# Patient Record
Sex: MALE | Race: WHITE | NOT HISPANIC OR LATINO | Employment: OTHER | URBAN - METROPOLITAN AREA
[De-identification: names, ages, dates, MRNs, and addresses within clinical notes are randomized per-mention and may not be internally consistent; named-entity substitution may affect disease eponyms.]

---

## 2017-04-20 ENCOUNTER — HOSPITAL ENCOUNTER (OUTPATIENT)
Dept: LAB | Facility: MEDICAL CENTER | Age: 77
End: 2017-04-20
Attending: INTERNAL MEDICINE
Payer: MEDICARE

## 2017-04-20 LAB
INR PPP: 3.27 (ref 0.87–1.13)
PROTHROMBIN TIME: 34.4 SEC (ref 12–14.6)

## 2017-04-20 PROCEDURE — 36415 COLL VENOUS BLD VENIPUNCTURE: CPT

## 2017-04-20 PROCEDURE — 85610 PROTHROMBIN TIME: CPT

## 2017-04-25 ENCOUNTER — HOSPITAL ENCOUNTER (OUTPATIENT)
Dept: HOSPITAL 8 - CACL | Age: 77
Discharge: HOME | End: 2017-04-25
Attending: INTERNAL MEDICINE
Payer: MEDICARE

## 2017-04-25 VITALS — BODY MASS INDEX: 24.96 KG/M2 | HEIGHT: 72 IN | WEIGHT: 184.31 LBS

## 2017-04-25 DIAGNOSIS — H40.9: ICD-10-CM

## 2017-04-25 DIAGNOSIS — M51.36: ICD-10-CM

## 2017-04-25 DIAGNOSIS — I48.91: Primary | ICD-10-CM

## 2017-04-25 DIAGNOSIS — Z95.1: ICD-10-CM

## 2017-04-25 DIAGNOSIS — G43.809: ICD-10-CM

## 2017-04-25 DIAGNOSIS — I34.0: ICD-10-CM

## 2017-04-25 DIAGNOSIS — F41.1: ICD-10-CM

## 2017-04-25 DIAGNOSIS — I10: ICD-10-CM

## 2017-04-25 DIAGNOSIS — I25.10: ICD-10-CM

## 2017-04-25 LAB — BUN SERPL-MCNC: 20 MG/DL (ref 7–18)

## 2017-04-25 PROCEDURE — 85025 COMPLETE CBC W/AUTO DIFF WBC: CPT

## 2017-04-25 PROCEDURE — 93005 ELECTROCARDIOGRAM TRACING: CPT

## 2017-04-25 PROCEDURE — 92960 CARDIOVERSION ELECTRIC EXT: CPT

## 2017-04-25 PROCEDURE — 80048 BASIC METABOLIC PNL TOTAL CA: CPT

## 2017-04-25 PROCEDURE — 85610 PROTHROMBIN TIME: CPT

## 2017-04-25 PROCEDURE — 36415 COLL VENOUS BLD VENIPUNCTURE: CPT

## 2017-05-03 ENCOUNTER — HOSPITAL ENCOUNTER (OUTPATIENT)
Dept: LAB | Facility: MEDICAL CENTER | Age: 77
End: 2017-05-03
Attending: INTERNAL MEDICINE
Payer: MEDICARE

## 2017-05-03 LAB
INR PPP: 2.76 (ref 0.87–1.13)
PROTHROMBIN TIME: 30 SEC (ref 12–14.6)

## 2017-05-03 PROCEDURE — 85610 PROTHROMBIN TIME: CPT

## 2017-05-03 PROCEDURE — 36415 COLL VENOUS BLD VENIPUNCTURE: CPT

## 2017-05-10 ENCOUNTER — HOSPITAL ENCOUNTER (OUTPATIENT)
Dept: LAB | Facility: MEDICAL CENTER | Age: 77
End: 2017-05-10
Attending: INTERNAL MEDICINE
Payer: MEDICARE

## 2017-05-10 LAB
INR PPP: 2.96 (ref 0.87–1.13)
PROTHROMBIN TIME: 31.7 SEC (ref 12–14.6)

## 2017-05-10 PROCEDURE — 85610 PROTHROMBIN TIME: CPT

## 2017-05-10 PROCEDURE — 36415 COLL VENOUS BLD VENIPUNCTURE: CPT

## 2017-05-23 ENCOUNTER — HOSPITAL ENCOUNTER (OUTPATIENT)
Dept: LAB | Facility: MEDICAL CENTER | Age: 77
End: 2017-05-23
Attending: INTERNAL MEDICINE
Payer: MEDICARE

## 2017-05-23 LAB
INR PPP: 2.69 (ref 0.87–1.13)
PROTHROMBIN TIME: 29.4 SEC (ref 12–14.6)

## 2017-05-23 PROCEDURE — 36415 COLL VENOUS BLD VENIPUNCTURE: CPT

## 2017-05-23 PROCEDURE — 85610 PROTHROMBIN TIME: CPT

## 2017-05-26 ENCOUNTER — HOSPITAL ENCOUNTER (OUTPATIENT)
Dept: HOSPITAL 8 - CACL | Age: 77
Discharge: HOME | End: 2017-05-26
Attending: INTERNAL MEDICINE
Payer: MEDICARE

## 2017-05-26 VITALS — SYSTOLIC BLOOD PRESSURE: 121 MMHG | DIASTOLIC BLOOD PRESSURE: 79 MMHG

## 2017-05-26 VITALS — HEIGHT: 71 IN | BODY MASS INDEX: 24.83 KG/M2 | WEIGHT: 177.36 LBS

## 2017-05-26 DIAGNOSIS — E03.9: ICD-10-CM

## 2017-05-26 DIAGNOSIS — F41.1: ICD-10-CM

## 2017-05-26 DIAGNOSIS — H40.9: ICD-10-CM

## 2017-05-26 DIAGNOSIS — G43.809: ICD-10-CM

## 2017-05-26 DIAGNOSIS — I34.0: ICD-10-CM

## 2017-05-26 DIAGNOSIS — E78.5: ICD-10-CM

## 2017-05-26 DIAGNOSIS — I48.0: Primary | ICD-10-CM

## 2017-05-26 DIAGNOSIS — I48.92: ICD-10-CM

## 2017-05-26 DIAGNOSIS — Z87.891: ICD-10-CM

## 2017-05-26 DIAGNOSIS — I25.10: ICD-10-CM

## 2017-05-26 DIAGNOSIS — Z95.1: ICD-10-CM

## 2017-05-26 DIAGNOSIS — M19.90: ICD-10-CM

## 2017-05-26 DIAGNOSIS — M51.36: ICD-10-CM

## 2017-05-26 LAB — BUN SERPL-MCNC: 21 MG/DL (ref 7–18)

## 2017-05-26 PROCEDURE — 36415 COLL VENOUS BLD VENIPUNCTURE: CPT

## 2017-05-26 PROCEDURE — 92960 CARDIOVERSION ELECTRIC EXT: CPT

## 2017-05-26 PROCEDURE — 80048 BASIC METABOLIC PNL TOTAL CA: CPT

## 2017-06-14 ENCOUNTER — HOSPITAL ENCOUNTER (OUTPATIENT)
Dept: LAB | Facility: MEDICAL CENTER | Age: 77
End: 2017-06-14
Attending: INTERNAL MEDICINE
Payer: MEDICARE

## 2017-06-14 LAB
INR PPP: 4.55 (ref 0.87–1.13)
PROTHROMBIN TIME: 44.5 SEC (ref 12–14.6)

## 2017-06-14 PROCEDURE — 85610 PROTHROMBIN TIME: CPT

## 2017-06-14 PROCEDURE — 36415 COLL VENOUS BLD VENIPUNCTURE: CPT

## 2017-07-03 ENCOUNTER — HOSPITAL ENCOUNTER (OUTPATIENT)
Dept: LAB | Facility: MEDICAL CENTER | Age: 77
End: 2017-07-03
Attending: INTERNAL MEDICINE
Payer: MEDICARE

## 2017-07-03 LAB
INR PPP: 3.37 (ref 0.87–1.13)
PROTHROMBIN TIME: 35.1 SEC (ref 12–14.6)

## 2017-07-03 PROCEDURE — 85610 PROTHROMBIN TIME: CPT

## 2017-07-03 PROCEDURE — 36415 COLL VENOUS BLD VENIPUNCTURE: CPT

## 2017-07-18 ENCOUNTER — HOSPITAL ENCOUNTER (OUTPATIENT)
Dept: LAB | Facility: MEDICAL CENTER | Age: 77
End: 2017-07-18
Attending: INTERNAL MEDICINE
Payer: MEDICARE

## 2017-07-18 LAB
INR PPP: 2.8 (ref 0.87–1.13)
PROTHROMBIN TIME: 30.4 SEC (ref 12–14.6)

## 2017-07-18 PROCEDURE — 85610 PROTHROMBIN TIME: CPT

## 2017-07-18 PROCEDURE — 36415 COLL VENOUS BLD VENIPUNCTURE: CPT

## 2017-08-02 ENCOUNTER — HOSPITAL ENCOUNTER (OUTPATIENT)
Dept: LAB | Facility: MEDICAL CENTER | Age: 77
End: 2017-08-02
Attending: INTERNAL MEDICINE
Payer: MEDICARE

## 2017-08-02 LAB
INR PPP: 2.23 (ref 0.87–1.13)
PROTHROMBIN TIME: 25.4 SEC (ref 12–14.6)

## 2017-08-02 PROCEDURE — 85610 PROTHROMBIN TIME: CPT

## 2017-08-02 PROCEDURE — 36415 COLL VENOUS BLD VENIPUNCTURE: CPT

## 2017-08-25 ENCOUNTER — HOSPITAL ENCOUNTER (OUTPATIENT)
Dept: LAB | Facility: MEDICAL CENTER | Age: 77
End: 2017-08-25
Attending: INTERNAL MEDICINE
Payer: MEDICARE

## 2017-08-25 LAB
INR PPP: 2.33 (ref 0.87–1.13)
PROTHROMBIN TIME: 26.3 SEC (ref 12–14.6)

## 2017-08-25 PROCEDURE — 85610 PROTHROMBIN TIME: CPT

## 2017-08-25 PROCEDURE — 36415 COLL VENOUS BLD VENIPUNCTURE: CPT

## 2017-09-02 ENCOUNTER — HOSPITAL ENCOUNTER (EMERGENCY)
Facility: MEDICAL CENTER | Age: 77
End: 2017-09-03
Attending: EMERGENCY MEDICINE
Payer: MEDICARE

## 2017-09-02 DIAGNOSIS — S05.02XA INJURY OF CONJUNCTIVA AND CORNEAL ABRASION OF LEFT EYE W/O FB, INITIAL ENCOUNTER: ICD-10-CM

## 2017-09-02 PROCEDURE — 99284 EMERGENCY DEPT VISIT MOD MDM: CPT

## 2017-09-03 VITALS
RESPIRATION RATE: 18 BRPM | WEIGHT: 190.26 LBS | TEMPERATURE: 99.3 F | HEIGHT: 72 IN | HEART RATE: 80 BPM | SYSTOLIC BLOOD PRESSURE: 145 MMHG | BODY MASS INDEX: 25.77 KG/M2 | DIASTOLIC BLOOD PRESSURE: 90 MMHG | OXYGEN SATURATION: 96 %

## 2017-09-03 PROCEDURE — 303754 HCHG EYE PATCH

## 2017-09-03 PROCEDURE — 700101 HCHG RX REV CODE 250

## 2017-09-03 PROCEDURE — 99284 EMERGENCY DEPT VISIT MOD MDM: CPT

## 2017-09-03 PROCEDURE — A9270 NON-COVERED ITEM OR SERVICE: HCPCS | Performed by: EMERGENCY MEDICINE

## 2017-09-03 PROCEDURE — 700102 HCHG RX REV CODE 250 W/ 637 OVERRIDE(OP): Performed by: EMERGENCY MEDICINE

## 2017-09-03 PROCEDURE — 65205 REMOVE FOREIGN BODY FROM EYE: CPT

## 2017-09-03 RX ORDER — OXYCODONE HYDROCHLORIDE AND ACETAMINOPHEN 5; 325 MG/1; MG/1
1-2 TABLET ORAL EVERY 4 HOURS PRN
Qty: 10 TAB | Refills: 0 | Status: SHIPPED | OUTPATIENT
Start: 2017-09-03 | End: 2017-12-28

## 2017-09-03 RX ORDER — OXYCODONE HYDROCHLORIDE AND ACETAMINOPHEN 5; 325 MG/1; MG/1
1 TABLET ORAL ONCE
Status: COMPLETED | OUTPATIENT
Start: 2017-09-03 | End: 2017-09-03

## 2017-09-03 RX ORDER — PROPARACAINE HYDROCHLORIDE 5 MG/ML
SOLUTION/ DROPS OPHTHALMIC
Status: COMPLETED
Start: 2017-09-03 | End: 2017-09-03

## 2017-09-03 RX ORDER — GENTAMICIN SULFATE 3 MG/ML
SOLUTION/ DROPS OPHTHALMIC
Status: COMPLETED
Start: 2017-09-03 | End: 2017-09-03

## 2017-09-03 RX ORDER — TOBRAMYCIN 3 MG/ML
1 SOLUTION/ DROPS OPHTHALMIC EVERY 4 HOURS
Qty: 1 BOTTLE | Refills: 0 | Status: SHIPPED | OUTPATIENT
Start: 2017-09-03 | End: 2017-12-28

## 2017-09-03 RX ADMIN — GENTAMICIN SULFATE 1 APPLICATION: 3 OINTMENT OPHTHALMIC at 00:30

## 2017-09-03 RX ADMIN — PROPARACAINE HYDROCHLORIDE 2 DROP: 5 SOLUTION/ DROPS OPHTHALMIC at 00:15

## 2017-09-03 RX ADMIN — OXYCODONE HYDROCHLORIDE AND ACETAMINOPHEN 1 TABLET: 5; 325 TABLET ORAL at 00:34

## 2017-09-03 NOTE — ED NOTES
Pt states he has pain in his left eye. He states that it is possible that the pain is from concealed eye dropper, as pain has not moved since 7pm today. He was seen for a similar problem in [place], where he was told he had a concealed eye drop in his left eye.

## 2017-09-03 NOTE — ED PROVIDER NOTES
ED Provider Note    CHIEF COMPLAINT  Chief Complaint   Patient presents with   • Eye Pain       HPI  Jett Nava is a 77 y.o. male who presents tonight with his wife with a chief complaint of severe left thigh pain with increased tearing that began this evening. He denies any trauma to his eye but states he feels like there is some sand in his eye. Just one week ago he had problems with the opposite eye after he put some chemical spray in his eye that was meant for his glasses. He currently has a history of glaucoma and is under the care of Dr. Gil and uses eyedrops daily. He denies any blurred or double vision but just has increased pain and tearing. His tetanus is up-to-date.    REVIEW OF SYSTEMS  See HPI for further details. All other system reviews are negative.    PAST MEDICAL HISTORY  Past Medical History:   Diagnosis Date   • Arthritis    • Glaucoma    • Headache(784.0)    • Heart attack (CMS-[facility])    • Migraine    • Muscle disorder        FAMILY HISTORY  Family History   Problem Relation Age of Onset   • Arthritis Mother    • Genetic Mother    • Cancer Father    • Alcohol/Drug Father    • Arthritis Brother    • Genetic Brother    • Alcohol/Drug Brother    • Alcohol/Drug Maternal Aunt    • Stroke Maternal Uncle    • Alcohol/Drug Maternal Uncle    • Genetic Paternal Uncle    • Genetic Maternal Grandmother    • Stroke Maternal Grandfather    • Genetic Paternal Grandmother    • Psychiatry Paternal Grandmother        SOCIAL HISTORY  Social History     Social History   • Marital status:      Spouse name: N/A   • Number of children: N/A   • Years of education: N/A     Social History Main Topics   • Smoking status: Former Smoker     Quit date: 1/1/1970   • Smokeless tobacco: Never Used   • Alcohol use 0.5 oz/week     1 Glasses of wine per week   • Drug use: No   • Sexual activity: Not on file     Other Topics Concern   • Not on file     Social History Narrative   • No narrative on file       SURGICAL  HISTORY  Past Surgical History:   Procedure Laterality Date   • KNEE ARTHROPLASTY TOTAL     • OPEN REDUCTION     • TIBIA ORIF     • TURP-VAPOR         CURRENT MEDICATIONS  See nurse's note    ALLERGIES  Allergies   Allergen Reactions   • Nsaids        PHYSICAL EXAM  VITAL SIGNS: /106   Pulse 74   Temp 37.4 °C (99.3 °F)   Resp 16   Ht 1.829 m (6')   Wt 86.3 kg (190 lb 4.1 oz)   BMI 25.80 kg/m²     Constitutional: Patient is well developed, well nourished in Severe distress secondary to his eye pain.  HENT: Normocephalic, atraumatic, Oropharynx moist , nose normal with no drainage.   Eyes: PERRL, EOMI, Conjunctiva are beefy red with increased tearing, there is a small white nonmovable hardened lesion on his left lower eyelid which is not removed with a cotton swab. Please see procedure note for further exam.   Neck: Supple with Normal range of motion in flexion, extension and lateral rotation.  Cardiovascular: Normal heart rate and rhythm. No murmur.  Thorax & Lungs: Clear and equal breath sounds with good excursion. No respiratory distress.  Abdomen: Bowel sounds normal in all four quadrants. Soft,nontender.  Skin: Warm, Dry, No erythema, No rashes.   Extremities: Peripheral pulses 4/4 No edema, No tenderness   Musculoskeletal: Normal range of motion in all major joints.   Neurologic: Alert & oriented x 3, Normal motor function, Normal sensory function.  Psychiatric: Affect normal, Judgment normal, Mood normal.       COURSE & MEDICAL DECISION MAKING  Pertinent Labs & Imaging studies reviewed. (See chart for details)  Procedure note: Proparacaine drops were used for local anesthesia, lid eversion reveals a small white pinpoint hardened lesion on his left lateral lower lid. It is not movable. There are no other foreign bodies noted upon upper lid eversion. Fluoresceins stain with Wood's lamp reveals a linear abrasion on the cornea in the inferior aspect from approximately 3:00 to 6:00. There are no  ulcerations noted. Gentamicin ophthalmic ointment and a double patch were applied to the left eye. Patient tolerated procedure well.  He was given Percocet for pain and instructions to keep his eye patched for the next 24 hours. Prescription for Tobrex ophthalmic solution and Percocet were given for home. He is to avoid driving or reading or watching TV for extended periods of time. Remove the eye patch and then begin the drops and see Dr. Gil in the office on Tuesday for recheck. He is return sooner for any worsening and he is stable upon discharge    FINAL IMPRESSION  1. Left eye corneal abrasion  2.   3.         Electronically signed by: Lteicia Smith, 9/3/2017 12:29 AM

## 2017-09-03 NOTE — DISCHARGE INSTRUCTIONS
Corneal Abrasion  The cornea is the clear covering at the front and center of the eye. When looking at the colored portion of the eye (iris), you are looking through the cornea. This very thin tissue is made up of many layers. The surface layer is a single layer of cells (corneal epithelium) and is one of the most sensitive tissues in the body. If a scratch or injury causes the corneal epithelium to come off, it is called a corneal abrasion. If the injury extends to the tissues below the epithelium, the condition is called a corneal ulcer.  CAUSES   · Scratches.  · Trauma.  · Foreign body in the eye.  Some people have recurrences of abrasions in the area of the original injury even after it has healed (recurrent erosion syndrome). Recurrent erosion syndrome generally improves and goes away with time.  SYMPTOMS   · Eye pain.  · Difficulty or inability to keep the injured eye open.  · The eye becomes very sensitive to light.  · Recurrent erosions tend to happen suddenly, first thing in the morning, usually after waking up and opening the eye.  DIAGNOSIS   Your health care provider can diagnose a corneal abrasion during an eye exam. Dye is usually placed in the eye using a drop or a small paper strip moistened by your tears. When the eye is examined with a special light, the abrasion shows up clearly because of the dye.  TREATMENT   · Small abrasions may be treated with antibiotic drops or ointment alone.  · A pressure patch may be put over the eye. If this is done, follow your doctor's instructions for when to remove the patch. Do not drive or use machines while the eye patch is on. Judging distances is hard to do with a patch on.  If the abrasion becomes infected and spreads to the deeper tissues of the cornea, a corneal ulcer can result. This is serious because it can cause corneal scarring. Corneal scars interfere with light passing through the cornea and cause a loss of vision in the involved eye.  HOME CARE  INSTRUCTIONS  · Use medicine or ointment as directed. Only take over-the-counter or prescription medicines for pain, discomfort, or fever as directed by your health care provider.  · Do not drive or operate machinery if your eye is patched. Your ability to  distances is impaired.  · If your health care provider has given you a follow-up appointment, it is very important to keep that appointment. Not keeping the appointment could result in a severe eye infection or permanent loss of vision. If there is any problem keeping the appointment, let your health care provider know.  SEEK MEDICAL CARE IF:   · You have pain, light sensitivity, and a scratchy feeling in one eye or both eyes.  · Your pressure patch keeps loosening up, and you can blink your eye under the patch after treatment.  · Any kind of discharge develops from the eye after treatment or if the lids stick together in the morning.  · You have the same symptoms in the morning as you did with the original abrasion days, weeks, or months after the abrasion healed.  MAKE SURE YOU:   · Understand these instructions.  · Will watch your condition.  · Will get help right away if you are not doing well or get worse.     This information is not intended to replace advice given to you by your health care provider. Make sure you discuss any questions you have with your health care provider.     Document Released: 12/15/2001 Document Revised: 12/23/2014 Document Reviewed: 08/25/2014  "[employer] Interactive Patient Education ©2016 "[employer] Inc.      Keep eye patched for 24 hours then remove patch and begin eyedrops.  You may also use sure eyedrops for your glaucoma as well.  Use the antibiotic eyedrops every 4 hours while awake for the next 5 days and follow-up with your ophthalmologist on Tuesday for recheck.  No driving while on pain meds or with the eye patched.

## 2017-09-25 ENCOUNTER — HOSPITAL ENCOUNTER (OUTPATIENT)
Dept: LAB | Facility: MEDICAL CENTER | Age: 77
End: 2017-09-25
Attending: INTERNAL MEDICINE
Payer: MEDICARE

## 2017-09-25 LAB
INR PPP: 1.68 (ref 0.87–1.13)
PROTHROMBIN TIME: 20.3 SEC (ref 12–14.6)

## 2017-09-25 PROCEDURE — 36415 COLL VENOUS BLD VENIPUNCTURE: CPT

## 2017-09-25 PROCEDURE — 85610 PROTHROMBIN TIME: CPT

## 2017-10-25 ENCOUNTER — HOSPITAL ENCOUNTER (OUTPATIENT)
Dept: LAB | Facility: MEDICAL CENTER | Age: 77
End: 2017-10-25
Attending: INTERNAL MEDICINE
Payer: MEDICARE

## 2017-10-25 LAB
INR PPP: 1.75 (ref 0.87–1.13)
PROTHROMBIN TIME: 21 SEC (ref 12–14.6)

## 2017-10-25 PROCEDURE — 36415 COLL VENOUS BLD VENIPUNCTURE: CPT

## 2017-10-25 PROCEDURE — 85610 PROTHROMBIN TIME: CPT

## 2017-12-08 ENCOUNTER — HOSPITAL ENCOUNTER (OUTPATIENT)
Dept: LAB | Facility: MEDICAL CENTER | Age: 77
End: 2017-12-08
Attending: INTERNAL MEDICINE
Payer: MEDICARE

## 2017-12-08 LAB
INR PPP: 1.88 (ref 0.87–1.13)
PROTHROMBIN TIME: 21.3 SEC (ref 12–14.6)

## 2017-12-08 PROCEDURE — 85610 PROTHROMBIN TIME: CPT

## 2017-12-08 PROCEDURE — 36415 COLL VENOUS BLD VENIPUNCTURE: CPT

## 2017-12-28 ENCOUNTER — HOSPITAL ENCOUNTER (EMERGENCY)
Facility: MEDICAL CENTER | Age: 77
End: 2017-12-28
Payer: MEDICARE

## 2017-12-28 ENCOUNTER — APPOINTMENT (OUTPATIENT)
Dept: RADIOLOGY | Facility: MEDICAL CENTER | Age: 77
End: 2017-12-28
Attending: HOSPITALIST
Payer: MEDICARE

## 2017-12-28 ENCOUNTER — HOSPITAL ENCOUNTER (OUTPATIENT)
Facility: MEDICAL CENTER | Age: 77
End: 2017-12-29
Attending: EMERGENCY MEDICINE | Admitting: HOSPITALIST
Payer: MEDICARE

## 2017-12-28 ENCOUNTER — RESOLUTE PROFESSIONAL BILLING HOSPITAL PROF FEE (OUTPATIENT)
Dept: HOSPITALIST | Facility: MEDICAL CENTER | Age: 77
End: 2017-12-28
Payer: MEDICARE

## 2017-12-28 ENCOUNTER — APPOINTMENT (OUTPATIENT)
Dept: RADIOLOGY | Facility: MEDICAL CENTER | Age: 77
End: 2017-12-28
Attending: EMERGENCY MEDICINE
Payer: MEDICARE

## 2017-12-28 DIAGNOSIS — R55 NEAR SYNCOPE: ICD-10-CM

## 2017-12-28 DIAGNOSIS — R53.1 WEAKNESS: ICD-10-CM

## 2017-12-28 PROBLEM — F41.9 ANXIETY: Status: ACTIVE | Noted: 2017-12-28

## 2017-12-28 PROBLEM — I48.91 AF (ATRIAL FIBRILLATION) (HCC): Status: ACTIVE | Noted: 2017-12-28

## 2017-12-28 PROBLEM — R79.1 SUBTHERAPEUTIC INTERNATIONAL NORMALIZED RATIO (INR): Status: ACTIVE | Noted: 2017-12-28

## 2017-12-28 PROBLEM — I10 HYPERTENSION: Status: ACTIVE | Noted: 2017-12-28

## 2017-12-28 PROBLEM — R42 DIZZINESS: Status: ACTIVE | Noted: 2017-12-28

## 2017-12-28 LAB
ALBUMIN SERPL BCP-MCNC: 4.2 G/DL (ref 3.2–4.9)
ALBUMIN/GLOB SERPL: 1.4 G/DL
ALP SERPL-CCNC: 80 U/L (ref 30–99)
ALT SERPL-CCNC: 9 U/L (ref 2–50)
ANION GAP SERPL CALC-SCNC: 6 MMOL/L (ref 0–11.9)
APTT PPP: 32.7 SEC (ref 24.7–36)
AST SERPL-CCNC: 26 U/L (ref 12–45)
BASOPHILS # BLD AUTO: 0.9 % (ref 0–1.8)
BASOPHILS # BLD: 0.07 K/UL (ref 0–0.12)
BILIRUB SERPL-MCNC: 0.7 MG/DL (ref 0.1–1.5)
BNP SERPL-MCNC: 88 PG/ML (ref 0–100)
BUN SERPL-MCNC: 16 MG/DL (ref 8–22)
CALCIUM SERPL-MCNC: 9.2 MG/DL (ref 8.5–10.5)
CHLORIDE SERPL-SCNC: 105 MMOL/L (ref 96–112)
CO2 SERPL-SCNC: 26 MMOL/L (ref 20–33)
CREAT SERPL-MCNC: 0.83 MG/DL (ref 0.5–1.4)
EOSINOPHIL # BLD AUTO: 0.19 K/UL (ref 0–0.51)
EOSINOPHIL NFR BLD: 2.4 % (ref 0–6.9)
ERYTHROCYTE [DISTWIDTH] IN BLOOD BY AUTOMATED COUNT: 48.3 FL (ref 35.9–50)
FLUAV RNA SPEC QL NAA+PROBE: NEGATIVE
FLUBV RNA SPEC QL NAA+PROBE: NEGATIVE
GFR SERPL CREATININE-BSD FRML MDRD: >60 ML/MIN/1.73 M 2
GLOBULIN SER CALC-MCNC: 3.1 G/DL (ref 1.9–3.5)
GLUCOSE SERPL-MCNC: 76 MG/DL (ref 65–99)
HCT VFR BLD AUTO: 46.1 % (ref 42–52)
HGB BLD-MCNC: 15 G/DL (ref 14–18)
IMM GRANULOCYTES # BLD AUTO: 0.03 K/UL (ref 0–0.11)
IMM GRANULOCYTES NFR BLD AUTO: 0.4 % (ref 0–0.9)
INR PPP: 1.55 (ref 0.87–1.13)
LIPASE SERPL-CCNC: 36 U/L (ref 11–82)
LYMPHOCYTES # BLD AUTO: 0.91 K/UL (ref 1–4.8)
LYMPHOCYTES NFR BLD: 11.3 % (ref 22–41)
MAGNESIUM SERPL-MCNC: 2.3 MG/DL (ref 1.5–2.5)
MCH RBC QN AUTO: 27.9 PG (ref 27–33)
MCHC RBC AUTO-ENTMCNC: 32.5 G/DL (ref 33.7–35.3)
MCV RBC AUTO: 85.8 FL (ref 81.4–97.8)
MONOCYTES # BLD AUTO: 0.71 K/UL (ref 0–0.85)
MONOCYTES NFR BLD AUTO: 8.8 % (ref 0–13.4)
NEUTROPHILS # BLD AUTO: 6.17 K/UL (ref 1.82–7.42)
NEUTROPHILS NFR BLD: 76.2 % (ref 44–72)
NRBC # BLD AUTO: 0 K/UL
NRBC BLD-RTO: 0 /100 WBC
PHOSPHATE SERPL-MCNC: 3.1 MG/DL (ref 2.5–4.5)
PLATELET # BLD AUTO: 214 K/UL (ref 164–446)
PMV BLD AUTO: 9.8 FL (ref 9–12.9)
POTASSIUM SERPL-SCNC: 4.7 MMOL/L (ref 3.6–5.5)
PROT SERPL-MCNC: 7.3 G/DL (ref 6–8.2)
PROTHROMBIN TIME: 18.3 SEC (ref 12–14.6)
RBC # BLD AUTO: 5.37 M/UL (ref 4.7–6.1)
SODIUM SERPL-SCNC: 137 MMOL/L (ref 135–145)
T4 FREE SERPL-MCNC: 1.28 NG/DL (ref 0.53–1.43)
TROPONIN I SERPL-MCNC: <0.01 NG/ML (ref 0–0.04)
TSH SERPL DL<=0.005 MIU/L-ACNC: 0.96 UIU/ML (ref 0.38–5.33)
WBC # BLD AUTO: 8.1 K/UL (ref 4.8–10.8)

## 2017-12-28 PROCEDURE — 83880 ASSAY OF NATRIURETIC PEPTIDE: CPT

## 2017-12-28 PROCEDURE — 85730 THROMBOPLASTIN TIME PARTIAL: CPT

## 2017-12-28 PROCEDURE — 70450 CT HEAD/BRAIN W/O DYE: CPT

## 2017-12-28 PROCEDURE — 84439 ASSAY OF FREE THYROXINE: CPT

## 2017-12-28 PROCEDURE — 84100 ASSAY OF PHOSPHORUS: CPT

## 2017-12-28 PROCEDURE — 85025 COMPLETE CBC W/AUTO DIFF WBC: CPT

## 2017-12-28 PROCEDURE — G0378 HOSPITAL OBSERVATION PER HR: HCPCS

## 2017-12-28 PROCEDURE — 83735 ASSAY OF MAGNESIUM: CPT

## 2017-12-28 PROCEDURE — 84443 ASSAY THYROID STIM HORMONE: CPT

## 2017-12-28 PROCEDURE — 84484 ASSAY OF TROPONIN QUANT: CPT

## 2017-12-28 PROCEDURE — 700102 HCHG RX REV CODE 250 W/ 637 OVERRIDE(OP): Performed by: HOSPITALIST

## 2017-12-28 PROCEDURE — 36415 COLL VENOUS BLD VENIPUNCTURE: CPT

## 2017-12-28 PROCEDURE — 99220 PR INITIAL OBSERVATION CARE,LEVL III: CPT | Performed by: HOSPITALIST

## 2017-12-28 PROCEDURE — 83690 ASSAY OF LIPASE: CPT

## 2017-12-28 PROCEDURE — 71010 DX-CHEST-PORTABLE (1 VIEW): CPT

## 2017-12-28 PROCEDURE — A9270 NON-COVERED ITEM OR SERVICE: HCPCS | Performed by: HOSPITALIST

## 2017-12-28 PROCEDURE — 87502 INFLUENZA DNA AMP PROBE: CPT

## 2017-12-28 PROCEDURE — 93005 ELECTROCARDIOGRAM TRACING: CPT | Performed by: EMERGENCY MEDICINE

## 2017-12-28 PROCEDURE — 85610 PROTHROMBIN TIME: CPT

## 2017-12-28 PROCEDURE — 99285 EMERGENCY DEPT VISIT HI MDM: CPT

## 2017-12-28 PROCEDURE — 80053 COMPREHEN METABOLIC PANEL: CPT

## 2017-12-28 RX ORDER — ONDANSETRON 4 MG/1
4 TABLET, ORALLY DISINTEGRATING ORAL EVERY 4 HOURS PRN
Status: DISCONTINUED | OUTPATIENT
Start: 2017-12-28 | End: 2017-12-29 | Stop reason: HOSPADM

## 2017-12-28 RX ORDER — LABETALOL HYDROCHLORIDE 5 MG/ML
10 INJECTION, SOLUTION INTRAVENOUS EVERY 4 HOURS PRN
Status: DISCONTINUED | OUTPATIENT
Start: 2017-12-28 | End: 2017-12-29 | Stop reason: HOSPADM

## 2017-12-28 RX ORDER — WARFARIN SODIUM 3 MG/1
3 TABLET ORAL DAILY
Status: DISCONTINUED | OUTPATIENT
Start: 2017-12-28 | End: 2017-12-28

## 2017-12-28 RX ORDER — HYDROMORPHONE HYDROCHLORIDE 2 MG/ML
0.25 INJECTION, SOLUTION INTRAMUSCULAR; INTRAVENOUS; SUBCUTANEOUS
Status: DISCONTINUED | OUTPATIENT
Start: 2017-12-28 | End: 2017-12-29 | Stop reason: HOSPADM

## 2017-12-28 RX ORDER — BISACODYL 10 MG
10 SUPPOSITORY, RECTAL RECTAL
Status: DISCONTINUED | OUTPATIENT
Start: 2017-12-28 | End: 2017-12-29 | Stop reason: HOSPADM

## 2017-12-28 RX ORDER — ESCITALOPRAM OXALATE 10 MG/1
5 TABLET ORAL EVERY EVENING
Status: DISCONTINUED | OUTPATIENT
Start: 2017-12-28 | End: 2017-12-29 | Stop reason: HOSPADM

## 2017-12-28 RX ORDER — BIMATOPROST 0.3 MG/ML
1 SOLUTION/ DROPS OPHTHALMIC EVERY EVENING
Status: ON HOLD | COMMUNITY
End: 2019-01-21

## 2017-12-28 RX ORDER — OXYCODONE HYDROCHLORIDE 5 MG/1
5 TABLET ORAL
Status: DISCONTINUED | OUTPATIENT
Start: 2017-12-28 | End: 2017-12-29 | Stop reason: HOSPADM

## 2017-12-28 RX ORDER — AMOXICILLIN 250 MG
2 CAPSULE ORAL 2 TIMES DAILY
Status: DISCONTINUED | OUTPATIENT
Start: 2017-12-29 | End: 2017-12-29 | Stop reason: HOSPADM

## 2017-12-28 RX ORDER — WARFARIN SODIUM 6 MG/1
6 TABLET ORAL DAILY
Status: DISCONTINUED | OUTPATIENT
Start: 2017-12-28 | End: 2017-12-29 | Stop reason: HOSPADM

## 2017-12-28 RX ORDER — ONDANSETRON 2 MG/ML
4 INJECTION INTRAMUSCULAR; INTRAVENOUS EVERY 4 HOURS PRN
Status: DISCONTINUED | OUTPATIENT
Start: 2017-12-28 | End: 2017-12-29 | Stop reason: HOSPADM

## 2017-12-28 RX ORDER — POLYETHYLENE GLYCOL 3350 17 G/17G
1 POWDER, FOR SOLUTION ORAL
Status: DISCONTINUED | OUTPATIENT
Start: 2017-12-28 | End: 2017-12-29 | Stop reason: HOSPADM

## 2017-12-28 RX ORDER — TOBRAMYCIN 3 MG/ML
1 SOLUTION/ DROPS OPHTHALMIC EVERY 4 HOURS
Status: DISCONTINUED | OUTPATIENT
Start: 2017-12-28 | End: 2017-12-28

## 2017-12-28 RX ORDER — TIMOLOL MALEATE 5 MG/ML
1 SOLUTION/ DROPS OPHTHALMIC 2 TIMES DAILY
COMMUNITY
End: 2019-01-16

## 2017-12-28 RX ORDER — OXYCODONE HYDROCHLORIDE 5 MG/1
2.5 TABLET ORAL
Status: DISCONTINUED | OUTPATIENT
Start: 2017-12-28 | End: 2017-12-29 | Stop reason: HOSPADM

## 2017-12-28 RX ORDER — MECLIZINE HYDROCHLORIDE 25 MG/1
25 TABLET ORAL 3 TIMES DAILY PRN
Status: DISCONTINUED | OUTPATIENT
Start: 2017-12-28 | End: 2017-12-29 | Stop reason: HOSPADM

## 2017-12-28 RX ADMIN — WARFARIN SODIUM 6 MG: 6 TABLET ORAL at 18:49

## 2017-12-28 RX ADMIN — ESCITALOPRAM OXALATE 5 MG: 10 TABLET ORAL at 21:40

## 2017-12-28 ASSESSMENT — ENCOUNTER SYMPTOMS
MYALGIAS: 0
FEVER: 0
NAUSEA: 0
CHILLS: 0
COUGH: 0
DIARRHEA: 0
TREMORS: 0
SHORTNESS OF BREATH: 0
BRUISES/BLEEDS EASILY: 0
NERVOUS/ANXIOUS: 0
EYE REDNESS: 0
HEARTBURN: 0
FOCAL WEAKNESS: 0
WEAKNESS: 1
PALPITATIONS: 0
DOUBLE VISION: 0
DEPRESSION: 0
BLOOD IN STOOL: 0
BLURRED VISION: 0
HEADACHES: 1
SORE THROAT: 0
DIZZINESS: 1
SPEECH CHANGE: 0

## 2017-12-28 ASSESSMENT — PATIENT HEALTH QUESTIONNAIRE - PHQ9
1. LITTLE INTEREST OR PLEASURE IN DOING THINGS: NOT AT ALL
SUM OF ALL RESPONSES TO PHQ9 QUESTIONS 1 AND 2: 0
2. FEELING DOWN, DEPRESSED, IRRITABLE, OR HOPELESS: NOT AT ALL
SUM OF ALL RESPONSES TO PHQ QUESTIONS 1-9: 0

## 2017-12-28 ASSESSMENT — LIFESTYLE VARIABLES
EVER_SMOKED: NEVER
ALCOHOL_USE: NO

## 2017-12-28 ASSESSMENT — PAIN SCALES - GENERAL
PAINLEVEL_OUTOF10: 0

## 2017-12-28 NOTE — H&P
Hospital Medicine History and Physical    Date of Service  12/28/2017    Chief Complaint  Chief Complaint   Patient presents with   • Weakness   • Tired       History of Presenting Illness  77 y.o. male who presented 12/28/2017 with dizziness and weakness. Patient states he woke up this morning went downstairs was sitting eating breakfast this got up turned and suddenly felt weak with dizziness. States that he was having difficulty with taking a few steps feeling off balance feeling as if he was almost going to pass out. He states he had associated nausea or shortness of breath and headache at the time. He felt as if he was going to black out however did not quite reach that severity. His symptoms currently are improved. Still with some slight dizziness however better. Also with slight headache. He's had history of atrial fibrillation recently taken off of amiodarone.    Primary Care Physician  Romaine Somers M.D.    Consultants  None    Code Status  Full    Review of Systems  Review of Systems   Constitutional: Negative for chills and fever.   HENT: Negative for congestion, sore throat and tinnitus.    Eyes: Negative for blurred vision, double vision and redness.   Respiratory: Negative for cough and shortness of breath.    Cardiovascular: Negative for chest pain, palpitations and leg swelling.   Gastrointestinal: Negative for blood in stool, diarrhea, heartburn and nausea.   Genitourinary: Negative for dysuria and hematuria.   Musculoskeletal: Negative for joint pain and myalgias.   Neurological: Positive for dizziness, weakness and headaches. Negative for tremors, speech change and focal weakness.   Endo/Heme/Allergies: Does not bruise/bleed easily.   Psychiatric/Behavioral: Negative for depression. The patient is not nervous/anxious.         Past Medical History  Past Medical History:   Diagnosis Date   • Arthritis    • Glaucoma    • Headache(784.0)    • Heart attack    • Migraine    • Muscle disorder         Surgical History  Past Surgical History:   Procedure Laterality Date   • KNEE ARTHROPLASTY TOTAL     • OPEN REDUCTION     • TIBIA ORIF     • TURP-VAPOR         Medications  No current facility-administered medications on file prior to encounter.      Current Outpatient Prescriptions on File Prior to Encounter   Medication Sig Dispense Refill   • amiodarone (PACERONE) 100 MG tablet Take 100 mg by mouth every day.     • warfarin (COUMADIN) 3 MG Tab Take 3-6 mg by mouth every day. 3 mg   6 mg      • escitalopram (LEXAPRO) 5 MG tablet Take 5 mg by mouth every day.     • nitroglycerin (NITROQUICK) 0.4 MG SUBL Place 0.4 mg under tongue every 5 minutes as needed.       • Testosterone (ANDROGEL) 25 MG/2.5GM GEL Apply 2.5 g to skin as directed every day.         Family History  Family History   Problem Relation Age of Onset   • Arthritis Mother    • Genetic Mother    • Cancer Father    • Alcohol/Drug Father    • Arthritis Brother    • Genetic Brother    • Alcohol/Drug Brother    • Alcohol/Drug Maternal Aunt    • Stroke Maternal Uncle    • Alcohol/Drug Maternal Uncle    • Genetic Paternal Uncle    • Genetic Maternal Grandmother    • Stroke Maternal Grandfather    • Genetic Paternal Grandmother    • Psychiatry Paternal Grandmother        Social History  Social History   Substance Use Topics   • Smoking status: Former Smoker     Quit date: 1970   • Smokeless tobacco: Never Used   • Alcohol use 0.5 oz/week     1 Glasses of wine per week       Allergies  Allergies   Allergen Reactions   • Nsaids         Physical Exam  Laboratory   Hemodynamics  Temp (24hrs), Av.1 °C (98.8 °F), Min:37.1 °C (98.8 °F), Max:37.1 °C (98.8 °F)   Temperature: 37.1 °C (98.8 °F)  Pulse  Av  Min: 60  Max: 60    Blood Pressure : (!) 172/98      Respiratory      Respiration: 16             Physical Exam   Constitutional: He is oriented to person, place, and time. He appears  well-developed and well-nourished.   HENT:   Head: Normocephalic and atraumatic.   Eyes: Conjunctivae and EOM are normal. Pupils are equal, round, and reactive to light.   Neck: Normal range of motion. Neck supple. No JVD present.   Cardiovascular: Normal rate, regular rhythm, normal heart sounds and intact distal pulses.    No murmur heard.  Pulmonary/Chest: Effort normal and breath sounds normal. No respiratory distress. He exhibits no tenderness.   Abdominal: Soft. Bowel sounds are normal. He exhibits no distension. There is no tenderness.   Musculoskeletal: Normal range of motion. He exhibits no edema.   Neurological: He is alert and oriented to person, place, and time. No cranial nerve deficit. He exhibits normal muscle tone.   Skin: Skin is warm and dry. No erythema.   Psychiatric: He has a normal mood and affect. His behavior is normal. Judgment and thought content normal.   Nursing note and vitals reviewed.      Recent Labs      12/28/17   1236   WBC  8.1   RBC  5.37   HEMOGLOBIN  15.0   HEMATOCRIT  46.1   MCV  85.8   MCH  27.9   MCHC  32.5*   RDW  48.3   PLATELETCT  214   MPV  9.8     Recent Labs      12/28/17   1236   SODIUM  137   POTASSIUM  4.7   CHLORIDE  105   CO2  26   GLUCOSE  76   BUN  16   CREATININE  0.83   CALCIUM  9.2     Recent Labs      12/28/17   1236   ALTSGPT  9   ASTSGOT  26   ALKPHOSPHAT  80   TBILIRUBIN  0.7   LIPASE  36   GLUCOSE  76     Recent Labs      12/28/17   1236   APTT  32.7   INR  1.55*     Recent Labs      12/28/17   1236   BNPBTYPENAT  88         Lab Results   Component Value Date    TROPONINI <0.01 12/28/2017     Urinalysis:    Lab Results  Component Value Date/Time   SPECGRAVITY 1.008 09/26/2005 1026   GLUCOSEUR Negative 09/26/2005 1026   KETONES Negative 09/26/2005 1026   NITRITE Negative 09/26/2005 1026   RBCURINE 10-20 (A) 09/26/2005 1026   BACTERIA Rare (A) 09/26/2005 1026   EPITHELCELL Rare 03/23/2005 1210        Imaging  DX-CHEST-PORTABLE (1 VIEW) [[id number]]  Resulted: 12/28/17 1409   Order Status: Completed Updated: 12/28/17 1411   Narrative:       12/28/2017 1:40 PM    HISTORY/REASON FOR EXAM:  Chest Pain.      TECHNIQUE/EXAM DESCRIPTION AND NUMBER OF VIEWS:  Single portable view of the chest.    COMPARISON: 9/26/2005    FINDINGS:    Median sternotomy wires are seen. The superior most median sternotomy wire is discontinuous. The cardiomediastinal silhouette is enlarged. Atherosclerotic calcification is seen.  No focal consolidation, pleural effusion or pneumothorax is identified.    Costophrenic angle is sharp. There is linear left midlung atelectasis versus scarring.   Impression:       Left midlung atelectasis versus scarring.    Cardiomegaly, increased compared to 2005. Patient has had interval median sternotomy.        Assessment/Plan     I anticipate this patient is appropriate for observation status at this time.    * Near syncope   Assessment & Plan    Near syncope with headaches and dizziness  Will order ct head  Carotid dopplers  Echocardiogram given enlargement noted on Cxr  Orthostatics  Tele  Monitor clinically        Hypertension   Assessment & Plan    Noted on admissio   No hx of Htn will conitnue to monitor   Will order PRNs        Subtherapeutic international normalized ratio (INR)   Assessment & Plan    Pharmacy to help with dosing        Dizziness   Assessment & Plan    Improving  Consider meclizine if no improvement will order PRN        Anxiety   Assessment & Plan    Cont lexapro        AF (atrial fibrillation) (CMS-[facility])   Assessment & Plan    On coumadin, off of amiodarone  Will monitor on tele  EKG w/ no afibb on admission rate controlled            VTE prophylaxis: on warfarin

## 2017-12-28 NOTE — ED NOTES
"PT BIB EMS per report pt was experiencing dizziness and sob this am and went to [facility] for evaluation, and was sent to the ER for further evaluation.  PT reports all sx have resolved but pt continues to feel \"tired\"  Chief Complaint   Patient presents with   • Weakness   • Tired     Blood pressure (!) 172/98, pulse 60, temperature 37.1 °C (98.8 °F), resp. rate 16, height 1.829 m (6'), weight 79.4 kg (175 lb).      "

## 2017-12-28 NOTE — ED PROVIDER NOTES
"ED Provider Note    Scribed for Arnoldo Machado M.D. by Karen Brown. 12/28/2017  12:46 PM    Primary Care Provider: Romaine Somers M.D.  Means of arrival: EMS  History limited by: None     CHIEF COMPLAINT  Chief Complaint   Patient presents with   • Weakness   • Tired       HPI  Jett Nava is a 77 y.o. male who presents to the ED complaining of weakness. Patient reports he woke up this morning, went downstairs, turned and suddenly felt weak. He describes weakness as feeling \"off balance\". He states associated shortness of breath, nausea, and mild headache. Patient also reports pain to left upper extremity he describes as \"discomfort\". Denies chest pain, diaphoresis, blurred vision, diarrhea, new skin rashes, new focal weakness or numbness.  Patient reports recent open heart surgery 11-.    REVIEW OF SYSTEMS    CONSTITUTIONAL:  Weakness   EYES:  Denies blurred vision.   ENT:  Denies sore throat, nose, or ear pain.  CARDIOVASCULAR:  Denies chest pain, palpitations, or swelling.  RESPIRATORY: shortness of breath, Denies difficulty breathing.  GI:  Denies abdominal pain, nausea, vomiting, or diarrhea.  MUSCULOSKELETAL: weakness, Denies  joint swelling, or back pain.  SKIN:  No rash or bruising.  NEUROLOGIC: headache Denies numbness.  PSYCHIATRIC:  Denies depression.  C    PAST MEDICAL HISTORY  Past Medical History:   Diagnosis Date   • Arthritis    • Glaucoma    • Headache(784.0)    • Heart attack    • Migraine    • Muscle disorder      FAMILY HISTORY  Family History   Problem Relation Age of Onset   • Arthritis Mother    • Genetic Mother    • Cancer Father    • Alcohol/Drug Father    • Arthritis Brother    • Genetic Brother    • Alcohol/Drug Brother    • Alcohol/Drug Maternal Aunt    • Stroke Maternal Uncle    • Alcohol/Drug Maternal Uncle    • Genetic Paternal Uncle    • Genetic Maternal Grandmother    • Stroke Maternal Grandfather    • Genetic Paternal Grandmother    • Psychiatry Paternal " Grandmother      SOCIAL HISTORY   reports that he quit smoking about 48 years ago. He has never used smokeless tobacco. He reports that he drinks about 0.5 oz of alcohol per week . He reports that he does not use drugs.    SURGICAL HISTORY  Past Surgical History:   Procedure Laterality Date   • KNEE ARTHROPLASTY TOTAL     • OPEN REDUCTION     • TIBIA ORIF     • TURP-VAPOR       CURRENT MEDICATIONS  No current facility-administered medications on file prior to encounter.      Current Outpatient Prescriptions on File Prior to Encounter   Medication Sig Dispense Refill   • tobramycin (TOBREX) 0.3 % Solution ophthalmic solution Place 1 Drop in left eye every 4 hours. While awake for 5 days 1 Bottle 0   • oxycodone-acetaminophen (PERCOCET) 5-325 MG Tab Take 1-2 Tabs by mouth every four hours as needed for Severe Pain. 10 Tab 0   • amiodarone (PACERONE) 100 MG tablet Take 100 mg by mouth every day.     • warfarin (COUMADIN) 3 MG Tab Take 3 mg by mouth every day.     • warfarin (COUMADIN) 6 MG Tab Take 6 mg by mouth every day.     • escitalopram (LEXAPRO) 5 MG tablet Take 5 mg by mouth every day.     • nitroglycerin (NITROQUICK) 0.4 MG SUBL Place 0.4 mg under tongue every 5 minutes as needed.       • Testosterone (ANDROGEL) 25 MG/2.5GM GEL Apply 2.5 g to skin as directed every day.       ALLERGIES  Allergies   Allergen Reactions   • Nsaids        PHYSICAL EXAM  VITAL SIGNS: BP (!) 172/98   Pulse 60   Temp 37.1 °C (98.8 °F)   Resp 16   Ht 1.829 m (6')   Wt 79.4 kg (175 lb)   BMI 23.73 kg/m²      Constitutional: Patient is awake and alert. No acute respiratory distress. Well developed, Well nourished, Non-toxic appearance.  HENT: Normocephalic, Atraumatic, Bilateral external ears normal, Oropharynx pink moist with no exudates, Nose patent.  Eyes: PERRLA, EOMI, Sclera and conjunctiva clear, No discharge.   Neck:  Supple no nuchal rigidity, no thyromegaly or mass. Non-tender  Lymphatic: No supraclavicular lymph nodes.    Cardiovascular: Heart is regular rate and rhythm no murmur, rub or thrill.   Thorax & Lungs: Chest is symmetrical, with good breath sounds. No wheezing or crackles. No respiratory distress, No chest tenderness.   Abdomen: Soft, No tenderness no hepatosplenomegaly there is no guarding or rebound, No masses, No pulsatile masses.  Skin: Warm, Dry, no petechia, purpura, or rash.   Back: Non tender with palpation  Extremities: No edema. Non tender.   Musculoskeletal: Good range of motion to wrists, elbows, shoulders, hips, knees, and ankles. Pulses 2+ radially and femorally. No gross deformities noted.   Neurologic: Alert & oriented to person, time, and place. Speech is normal, Strength is 5 over 5 and symmetric in bilateral upper and lower extremities.  Sensory is intact to light touch to face, arms, and legs.  Psychiatric: Normal affect    EKG  1307- 13 Lead EKG interpreted by me shows normal sinus rhythm at 65.  .  first degree av block Axis QRS -8, No ST elevations. No old EKG for comparison     LABS  Results for orders placed or performed during the hospital encounter of 12/28/17   CBC w/ Differential   Result Value Ref Range    WBC 8.1 4.8 - 10.8 K/uL    RBC 5.37 4.70 - 6.10 M/uL    Hemoglobin 15.0 14.0 - 18.0 g/dL    Hematocrit 46.1 42.0 - 52.0 %    MCV 85.8 81.4 - 97.8 fL    MCH 27.9 27.0 - 33.0 pg    MCHC 32.5 (L) 33.7 - 35.3 g/dL    RDW 48.3 35.9 - 50.0 fL    Platelet Count 214 164 - 446 K/uL    MPV 9.8 9.0 - 12.9 fL    Neutrophils-Polys 76.20 (H) 44.00 - 72.00 %    Lymphocytes 11.30 (L) 22.00 - 41.00 %    Monocytes 8.80 0.00 - 13.40 %    Eosinophils 2.40 0.00 - 6.90 %    Basophils 0.90 0.00 - 1.80 %    Immature Granulocytes 0.40 0.00 - 0.90 %    Nucleated RBC 0.00 /100 WBC    Neutrophils (Absolute) 6.17 1.82 - 7.42 K/uL    Lymphs (Absolute) 0.91 (L) 1.00 - 4.80 K/uL    Monos (Absolute) 0.71 0.00 - 0.85 K/uL    Eos (Absolute) 0.19 0.00 - 0.51 K/uL    Baso (Absolute) 0.07 0.00 - 0.12 K/uL    Immature  Granulocytes (abs) 0.03 0.00 - 0.11 K/uL    NRBC (Absolute) 0.00 K/uL   Complete Metabolic Panel (CMP)   Result Value Ref Range    Sodium 137 135 - 145 mmol/L    Potassium 4.7 3.6 - 5.5 mmol/L    Chloride 105 96 - 112 mmol/L    Co2 26 20 - 33 mmol/L    Anion Gap 6.0 0.0 - 11.9    Glucose 76 65 - 99 mg/dL    Bun 16 8 - 22 mg/dL    Creatinine 0.83 0.50 - 1.40 mg/dL    Calcium 9.2 8.5 - 10.5 mg/dL    AST(SGOT) 26 12 - 45 U/L    ALT(SGPT) 9 2 - 50 U/L    Alkaline Phosphatase 80 30 - 99 U/L    Total Bilirubin 0.7 0.1 - 1.5 mg/dL    Albumin 4.2 3.2 - 4.9 g/dL    Total Protein 7.3 6.0 - 8.2 g/dL    Globulin 3.1 1.9 - 3.5 g/dL    A-G Ratio 1.4 g/dL   Btype Natriuretic Peptide   Result Value Ref Range    B Natriuretic Peptide 88 0 - 100 pg/mL   Troponin STAT   Result Value Ref Range    Troponin I <0.01 0.00 - 0.04 ng/mL   Lipase   Result Value Ref Range    Lipase 36 11 - 82 U/L   Magnesium   Result Value Ref Range    Magnesium 2.3 1.5 - 2.5 mg/dL   Phosphorus   Result Value Ref Range    Phosphorus 3.1 2.5 - 4.5 mg/dL   PT/INR   Result Value Ref Range    PT 18.3 (H) 12.0 - 14.6 sec    INR 1.55 (H) 0.87 - 1.13   APTT   Result Value Ref Range    APTT 32.7 24.7 - 36.0 sec   TSH (for screening thyroid dysfunction)   Result Value Ref Range    TSH 0.960 0.380 - 5.330 uIU/mL   Free Thyroxine (add to TSH for patients with existing thyroid dysfunction)   Result Value Ref Range    Free T-4 1.28 0.53 - 1.43 ng/dL   Influenza By PCR, A/B   Result Value Ref Range    Influenza virus A RNA Negative Negative    Influenza virus B, PCR Negative Negative   ESTIMATED GFR   Result Value Ref Range    GFR If African American >60 >60 mL/min/1.73 m 2    GFR If Non African American >60 >60 mL/min/1.73 m 2     All labs reviewed by me.    RADIOLOGY/PROCEDURES  DX-CHEST-PORTABLE (1 VIEW)   Final Result      Left midlung atelectasis versus scarring.      Cardiomegaly, increased compared to 2005. Patient has had interval median sternotomy.       Echocardiogram Comp W/O Cont    (Results Pending)   Carotid Duplex ([facility] Only)    (Results Pending)   CT-HEAD W/O    (Results Pending)     The radiologist's interpretations of all radiological studies have been reviewed by me.     COURSE & MEDICAL DECISION MAKING  Pertinent Labs & Imaging studies reviewed. (See chart for details)    Differential diagnoses include but are not limited to Cardiac dysrhythmia, stroke, medication side effects, electrolyte imbalances, sepsis or infection    12:46 PM - Patient seen and examined at bedside. There was no old EKG to review, I request his medical records with nursing staff. Ordered DX-Chest, Influenza Rapid, Influenza by PCR,A?B, CBC with differential, CMP, B Type Natruretic Peptide, Troponin STAT, Lipase, Magnesium, Phosphorous, PT/INR, TSH, Free Thyroxine, EKG.     1:30 PM - Recheck: I updated patient on treatment plan.      2:03 PM - Recheck: Patient is resting comfortably and reports feeling improved. I updated him on his results and explained that he will be admitted for further care and evaluation. He understands and agrees.    2:49 PM I discussed the patient's case and the above findings with Dr. Tamayo (hospitalist) who agrees to admit patient.     Decision Making  Patient who presents to the emergency department with a near-syncopal episode and some pain to his left arm. He was taken off amiodarone and finished his last dose on 19 December. Last couple hours now feels much better. Etiology of his near syncope is unclear but certainly cardiac dysrhythmias are a concern. Discussed the case with the [facility] Hospitalist and they'll admit to hospital for further care and evaluation    DISPOSITION:  Patient will be admitted to Dr Tamayo in guarded condition.    FINAL IMPRESSION  1. Weakness    2. Near syncope      PLAN  1.Admission [facility] Hospitalist  2. Continue workup and evaluation of this patient's near syncopal episode       I, Karen Brown  (Scribe), am scribing for, and in the presence of, Arnoldo Machado M.D..    Electronically signed by: Karen Brown (Scribe), 12/28/2017    IArnoldo M.D. personally performed the services described in this documentation, as scribed by Karen Brown in my presence, and it is both accurate and complete.    The note accurately reflects work and decisions made by me.  Arnoldo Machado  12/28/2017  7:14 PM

## 2017-12-28 NOTE — ASSESSMENT & PLAN NOTE
Near syncope with headaches and dizziness  Will order ct head  Carotid dopplers  Echocardiogram given enlargement noted on Cxr  Orthostatics  Tele  Monitor clinically

## 2017-12-28 NOTE — ASSESSMENT & PLAN NOTE
On coumadin, off of amiodarone  Will monitor on tele  EKG w/ no afibb on admission rate controlled

## 2017-12-29 ENCOUNTER — PATIENT OUTREACH (OUTPATIENT)
Dept: HEALTH INFORMATION MANAGEMENT | Facility: OTHER | Age: 77
End: 2017-12-29

## 2017-12-29 VITALS
HEART RATE: 77 BPM | HEIGHT: 72 IN | WEIGHT: 188.27 LBS | SYSTOLIC BLOOD PRESSURE: 124 MMHG | DIASTOLIC BLOOD PRESSURE: 86 MMHG | TEMPERATURE: 99.2 F | RESPIRATION RATE: 19 BRPM | OXYGEN SATURATION: 97 % | BODY MASS INDEX: 25.5 KG/M2

## 2017-12-29 LAB
ALBUMIN SERPL BCP-MCNC: 3.2 G/DL (ref 3.2–4.9)
ALBUMIN/GLOB SERPL: 1.2 G/DL
ALP SERPL-CCNC: 74 U/L (ref 30–99)
ALT SERPL-CCNC: 8 U/L (ref 2–50)
ANION GAP SERPL CALC-SCNC: 6 MMOL/L (ref 0–11.9)
AST SERPL-CCNC: 10 U/L (ref 12–45)
BASOPHILS # BLD AUTO: 1.1 % (ref 0–1.8)
BASOPHILS # BLD: 0.07 K/UL (ref 0–0.12)
BILIRUB SERPL-MCNC: 0.4 MG/DL (ref 0.1–1.5)
BUN SERPL-MCNC: 17 MG/DL (ref 8–22)
CALCIUM SERPL-MCNC: 8.2 MG/DL (ref 8.5–10.5)
CHLORIDE SERPL-SCNC: 108 MMOL/L (ref 96–112)
CHOLEST SERPL-MCNC: 102 MG/DL (ref 100–199)
CO2 SERPL-SCNC: 24 MMOL/L (ref 20–33)
CORTIS SERPL-MCNC: 10.8 UG/DL (ref 0–23)
CREAT SERPL-MCNC: 0.73 MG/DL (ref 0.5–1.4)
EOSINOPHIL # BLD AUTO: 0.18 K/UL (ref 0–0.51)
EOSINOPHIL NFR BLD: 2.8 % (ref 0–6.9)
ERYTHROCYTE [DISTWIDTH] IN BLOOD BY AUTOMATED COUNT: 47.8 FL (ref 35.9–50)
EST. AVERAGE GLUCOSE BLD GHB EST-MCNC: 117 MG/DL
GFR SERPL CREATININE-BSD FRML MDRD: >60 ML/MIN/1.73 M 2
GLOBULIN SER CALC-MCNC: 2.6 G/DL (ref 1.9–3.5)
GLUCOSE SERPL-MCNC: 94 MG/DL (ref 65–99)
HBA1C MFR BLD: 5.7 % (ref 0–5.6)
HCT VFR BLD AUTO: 40.4 % (ref 42–52)
HDLC SERPL-MCNC: 44 MG/DL
HGB BLD-MCNC: 13.1 G/DL (ref 14–18)
IMM GRANULOCYTES # BLD AUTO: 0.04 K/UL (ref 0–0.11)
IMM GRANULOCYTES NFR BLD AUTO: 0.6 % (ref 0–0.9)
INR PPP: 1.79 (ref 0.87–1.13)
LDLC SERPL CALC-MCNC: 48 MG/DL
LV EJECT FRACT  99904: 55
LV EJECT FRACT MOD 2C 99903: 55.36
LV EJECT FRACT MOD 4C 99902: 55.74
LV EJECT FRACT MOD BP 99901: 55.71
LYMPHOCYTES # BLD AUTO: 1.46 K/UL (ref 1–4.8)
LYMPHOCYTES NFR BLD: 22.6 % (ref 22–41)
MCH RBC QN AUTO: 27.2 PG (ref 27–33)
MCHC RBC AUTO-ENTMCNC: 32.4 G/DL (ref 33.7–35.3)
MCV RBC AUTO: 83.8 FL (ref 81.4–97.8)
MONOCYTES # BLD AUTO: 0.73 K/UL (ref 0–0.85)
MONOCYTES NFR BLD AUTO: 11.3 % (ref 0–13.4)
NEUTROPHILS # BLD AUTO: 3.97 K/UL (ref 1.82–7.42)
NEUTROPHILS NFR BLD: 61.6 % (ref 44–72)
NRBC # BLD AUTO: 0 K/UL
NRBC BLD-RTO: 0 /100 WBC
PLATELET # BLD AUTO: 186 K/UL (ref 164–446)
PMV BLD AUTO: 9.1 FL (ref 9–12.9)
POTASSIUM SERPL-SCNC: 3.7 MMOL/L (ref 3.6–5.5)
PROT SERPL-MCNC: 5.8 G/DL (ref 6–8.2)
PROTHROMBIN TIME: 20.5 SEC (ref 12–14.6)
RBC # BLD AUTO: 4.82 M/UL (ref 4.7–6.1)
SODIUM SERPL-SCNC: 138 MMOL/L (ref 135–145)
TRIGL SERPL-MCNC: 48 MG/DL (ref 0–149)
TROPONIN I SERPL-MCNC: <0.01 NG/ML (ref 0–0.04)
WBC # BLD AUTO: 6.5 K/UL (ref 4.8–10.8)

## 2017-12-29 PROCEDURE — 84484 ASSAY OF TROPONIN QUANT: CPT

## 2017-12-29 PROCEDURE — 80061 LIPID PANEL: CPT

## 2017-12-29 PROCEDURE — 36415 COLL VENOUS BLD VENIPUNCTURE: CPT

## 2017-12-29 PROCEDURE — 99217 PR OBSERVATION CARE DISCHARGE: CPT | Performed by: HOSPITALIST

## 2017-12-29 PROCEDURE — 93880 EXTRACRANIAL BILAT STUDY: CPT

## 2017-12-29 PROCEDURE — 302135 SEQUENTIAL COMPRESSION MACHINE: Performed by: HOSPITALIST

## 2017-12-29 PROCEDURE — 82533 TOTAL CORTISOL: CPT

## 2017-12-29 PROCEDURE — G0378 HOSPITAL OBSERVATION PER HR: HCPCS

## 2017-12-29 PROCEDURE — 306588 SLEEVE,VASO CALF MED: Performed by: HOSPITALIST

## 2017-12-29 PROCEDURE — 85610 PROTHROMBIN TIME: CPT

## 2017-12-29 PROCEDURE — 85025 COMPLETE CBC W/AUTO DIFF WBC: CPT

## 2017-12-29 PROCEDURE — 83036 HEMOGLOBIN GLYCOSYLATED A1C: CPT

## 2017-12-29 PROCEDURE — 700102 HCHG RX REV CODE 250 W/ 637 OVERRIDE(OP): Performed by: HOSPITALIST

## 2017-12-29 PROCEDURE — 93306 TTE W/DOPPLER COMPLETE: CPT

## 2017-12-29 PROCEDURE — 93306 TTE W/DOPPLER COMPLETE: CPT | Mod: 26 | Performed by: INTERNAL MEDICINE

## 2017-12-29 PROCEDURE — A9270 NON-COVERED ITEM OR SERVICE: HCPCS | Performed by: HOSPITALIST

## 2017-12-29 PROCEDURE — 80053 COMPREHEN METABOLIC PANEL: CPT

## 2017-12-29 RX ORDER — OFLOXACIN 3 MG/ML
5 SOLUTION AURICULAR (OTIC) DAILY
Qty: 5 ML | Refills: 0 | Status: SHIPPED | OUTPATIENT
Start: 2017-12-29 | End: 2017-12-29

## 2017-12-29 RX ORDER — ACETAMINOPHEN 325 MG/1
650 TABLET ORAL EVERY 4 HOURS PRN
Status: DISCONTINUED | OUTPATIENT
Start: 2017-12-29 | End: 2017-12-29 | Stop reason: HOSPADM

## 2017-12-29 RX ORDER — OFLOXACIN 3 MG/ML
5 SOLUTION AURICULAR (OTIC) DAILY
Qty: 5 ML | Refills: 0 | Status: SHIPPED | OUTPATIENT
Start: 2017-12-29 | End: 2018-01-03

## 2017-12-29 RX ADMIN — ACETAMINOPHEN 650 MG: 325 TABLET, FILM COATED ORAL at 08:47

## 2017-12-29 ASSESSMENT — CHA2DS2 SCORE
HYPERTENSION: YES
AGE 75 OR GREATER: YES
CHA2DS2 VASC SCORE: 3
CHF OR LEFT VENTRICULAR DYSFUNCTION: NO
PRIOR STROKE OR TIA OR THROMBOEMBOLISM: NO
DIABETES: NO
VASCULAR DISEASE: NO
SEX: MALE
AGE 65 TO 74: NO

## 2017-12-29 ASSESSMENT — PATIENT HEALTH QUESTIONNAIRE - PHQ9
2. FEELING DOWN, DEPRESSED, IRRITABLE, OR HOPELESS: NOT AT ALL
SUM OF ALL RESPONSES TO PHQ QUESTIONS 1-9: 0
1. LITTLE INTEREST OR PLEASURE IN DOING THINGS: NOT AT ALL
SUM OF ALL RESPONSES TO PHQ9 QUESTIONS 1 AND 2: 0

## 2017-12-29 ASSESSMENT — PAIN SCALES - GENERAL
PAINLEVEL_OUTOF10: 0
PAINLEVEL_OUTOF10: 6
PAINLEVEL_OUTOF10: 0

## 2017-12-29 NOTE — PROGRESS NOTES
Back from CT scan,tolerated procedure well,hooked  Back to the monitors,assessment completed ,poc discussed,verbalized understanding,denies dizziness,cp,sob,n/t at this time,ambulatory, SR on tele hr=70,call button within reach,will continue to monitor.

## 2017-12-29 NOTE — DISCHARGE INSTRUCTIONS
Discharge Instructions    Discharged to home by car with relative. Discharged via walking, hospital escort: Yes.  Special equipment needed: Not Applicable    Be sure to schedule a follow-up appointment with your primary care doctor or any specialists as instructed.     Discharge Plan:   Diet Plan: Discussed  Activity Level: Discussed  Confirmed Follow up Appointment: Patient to Call and Schedule Appointment  Confirmed Symptoms Management: Discussed  Medication Reconciliation Updated: Yes  Influenza Vaccine Indication: Not indicated: Previously immunized this influenza season and > 8 years of age    I understand that a diet low in cholesterol, fat, and sodium is recommended for good health. Unless I have been given specific instructions below for another diet, I accept this instruction as my diet prescription.   Other diet:     Special Instructions: None    · Is patient discharged on Warfarin / Coumadin?   Yes    You are receiving the drug warfarin. Please understand the importance of monitoring warfarin with scheduled PT/INR blood draws.  Follow-up with a call to your personal Doctor's office in 3 days to schedule a PT/INR. .    IMPORTANT: HOW TO USE THIS INFORMATION:  This is a summary and does NOT have all possible information about this product. This information does not assure that this product is safe, effective, or appropriate for you. This information is not individual medical advice and does not substitute for the advice of your health care professional. Always ask your health care professional for complete information about this product and your specific health needs.      WARFARIN - ORAL (WARF-uh-rin)      COMMON BRAND NAME(S): Coumadin      WARNING:  Warfarin can cause very serious (possibly fatal) bleeding. This is more likely to occur when you first start taking this medication or if you take too much warfarin. To decrease your risk for bleeding, your doctor or other health care provider will monitor  "you closely and check your lab results (INR test) to make sure you are not taking too much warfarin. Keep all medical and laboratory appointments. Tell your doctor right away if you notice any signs of serious bleeding. See also Side Effects section.      USES:  This medication is used to treat blood clots (such as in deep vein thrombosis-DVT or pulmonary embolus-PE) and/or to prevent new clots from forming in your body. Preventing harmful blood clots helps to reduce the risk of a stroke or heart attack. Conditions that increase your risk of developing blood clots include a certain type of irregular heart rhythm (atrial fibrillation), heart valve replacement, recent heart attack, and certain surgeries (such as hip/knee replacement). Warfarin is commonly called a \"blood thinner,\" but the more correct term is \"anticoagulant.\" It helps to keep blood flowing smoothly in your body by decreasing the amount of certain substances (clotting proteins) in your blood.      HOW TO USE:  Read the Medication Guide provided by your pharmacist before you start taking warfarin and each time you get a refill. If you have any questions, ask your doctor or pharmacist. Take this medication by mouth with or without food as directed by your doctor or other health care professional, usually once a day. It is very important to take it exactly as directed. Do not increase the dose, take it more frequently, or stop using it unless directed by your doctor. Dosage is based on your medical condition, laboratory tests (such as INR), and response to treatment. Your doctor or other health care provider will monitor you closely while you are taking this medication to determine the right dose for you. Use this medication regularly to get the most benefit from it. To help you remember, take it at the same time each day. It is important to eat a balanced, consistent diet while taking warfarin. Some foods can affect how warfarin works in your body and " may affect your treatment and dose. Avoid sudden large increases or decreases in your intake of foods high in vitamin K (such as broccoli, cauliflower, cabbage, brussels sprouts, kale, spinach, and other green leafy vegetables, liver, green tea, certain vitamin supplements). If you are trying to lose weight, check with your doctor before you try to go on a diet. Cranberry products may also affect how your warfarin works. Limit the amount of cranberry juice (16 ounces/480 milliliters a day) or other cranberry products you may drink or eat.      SIDE EFFECTS:  Nausea, loss of appetite, or stomach/abdominal pain may occur. If any of these effects persist or worsen, tell your doctor or pharmacist promptly. Remember that your doctor has prescribed this medication because he or she has judged that the benefit to you is greater than the risk of side effects. Many people using this medication do not have serious side effects. This medication can cause serious bleeding if it affects your blood clotting proteins too much (shown by unusually high INR lab results). Even if your doctor stops your medication, this risk of bleeding can continue for up to a week. Tell your doctor right away if you have any signs of serious bleeding, including: unusual pain/swelling/discomfort, unusual/easy bruising, prolonged bleeding from cuts or gums, persistent/frequent nosebleeds, unusually heavy/prolonged menstrual flow, pink/dark urine, coughing up blood, vomit that is bloody or looks like coffee grounds, severe headache, dizziness/fainting, unusual or persistent tiredness/weakness, bloody/black/tarry stools, chest pain, shortness of breath, difficulty swallowing. Tell your doctor right away if any of these unlikely but serious side effects occur: persistent nausea/vomiting, severe stomach/abdominal pain, yellowing eyes/skin. This drug rarely has caused very serious (possibly fatal) problems if its effects lead to small blood clots (usually  at the beginning of treatment). This can lead to severe skin/tissue damage that may require surgery or amputation if left untreated. Patients with certain blood conditions (protein C or S deficiency) may be at greater risk. Get medical help right away if any of these rare but serious side effects occur: painful/red/purplish patches on the skin (such as on the toe, breast, abdomen), change in the amount of urine, vision changes, confusion, slurred speech, weakness on one side of the body. A very serious allergic reaction to this drug is rare. However, get medical help right away if you notice any symptoms of a serious allergic reaction, including: rash, itching/swelling (especially of the face/tongue/throat), severe dizziness, trouble breathing. This is not a complete list of possible side effects. If you notice other effects not listed above, contact your doctor or pharmacist. In the [place] - Call your doctor for medical advice about side effects. You may report side effects to FDA at 7-351-CMS-6953. In [place] - Call your doctor for medical advice about side effects. You may report side effects to [facility] [place] at 1-810.427.4446.      PRECAUTIONS:  Before taking warfarin, tell your doctor or pharmacist if you are allergic to it; or if you have any other allergies. This product may contain inactive ingredients, which can cause allergic reactions or other problems. Talk to your pharmacist for more details. Before using this medication, tell your doctor or pharmacist your medical history, especially of: blood disorders (such as anemia, hemophilia), bleeding problems (such as bleeding of the stomach/intestines, bleeding in the brain), blood vessel disorders (such as aneurysms), recent major injury/surgery, liver disease, alcohol use, mental/mood disorders (including memory problems), frequent falls/injuries. It is important that all your doctors and dentists know that you take warfarin. Before having surgery or any  medical/dental procedures, tell your doctor or dentist that you are taking this medication and about all the products you use (including prescription drugs, nonprescription drugs, and herbal products). Avoid getting injections into the muscles. If you must have an injection into a muscle (for example, a flu shot), it should be given in the arm. This way, it will be easier to check for bleeding and/or apply pressure bandages. This medication may cause stomach bleeding. Daily use of alcohol while using this medicine will increase your risk for stomach bleeding and may also affect how this medication works. Limit or avoid alcoholic beverages. If you have not been eating well, if you have an illness or infection that causes fever, vomiting, or diarrhea for more than 2 days, or if you start using any antibiotic medications, contact your doctor or pharmacist immediately because these conditions can affect how warfarin works. This medication can cause heavy bleeding. To lower the chance of getting cut, bruised, or injured, use great caution with sharp objects like safety razors and nail cutters. Use an electric razor when shaving and a soft toothbrush when brushing your teeth. Avoid activities such as contact sports. If you fall or injure yourself, especially if you hit your head, call your doctor immediately. Your doctor may need to check you. The Food & Drug Administration has stated that generic warfarin products are interchangeable. However, consult your doctor or pharmacist before switching warfarin products. Be careful not to take more than one medication that contains warfarin unless specifically directed by the doctor or health care provider who is monitoring your warfarin treatment. Older adults may be at greater risk for bleeding while using this drug. This medication is not recommended for use during pregnancy because of serious (possibly fatal) harm to an unborn baby. Discuss the use of reliable forms of birth  "control with your doctor. If you become pregnant or think you may be pregnant, tell your doctor immediately. If you are planning pregnancy, discuss a plan for managing your condition with your doctor before you become pregnant. Your doctor may switch the type of medication you use during pregnancy. Very small amounts of this medication may pass into breast milk but is unlikely to harm a nursing infant. Consult your doctor before breast-feeding.      DRUG INTERACTIONS:  Drug interactions may change how your medications work or increase your risk for serious side effects. This document does not contain all possible drug interactions. Keep a list of all the products you use (including prescription/nonprescription drugs and herbal products) and share it with your doctor and pharmacist. Do not start, stop, or change the dosage of any medicines without your doctor's approval. Warfarin interacts with many prescription, nonprescription, vitamin, and herbal products. This includes medications that are applied to the skin or inside the vagina or rectum. The interactions with warfarin usually result in an increase or decrease in the \"blood-thinning\" (anticoagulant) effect. Your doctor or other health care professional should closely monitor you to prevent serious bleeding or clotting problems. While taking warfarin, it is very important to tell your doctor or pharmacist of any changes in medications, vitamins, or herbal products that you are taking. Some products that may interact with this drug include: capecitabine, imatinib, mifepristone. Aspirin, aspirin-like drugs (salicylates), and nonsteroidal anti-inflammatory drugs (NSAIDs such as ibuprofen, naproxen, celecoxib) may have effects similar to warfarin. These drugs may increase the risk of bleeding problems if taken during treatment with warfarin. Carefully check all prescription/nonprescription product labels (including drugs applied to the skin such as pain-relieving " creams) since the products may contain NSAIDs or salicylates. Talk to your doctor about using a different medication (such as acetaminophen) to treat pain/fever. Low-dose aspirin and related drugs (such as clopidogrel, ticlopidine) should be continued if prescribed by your doctor for specific medical reasons such as heart attack or stroke prevention. Consult your doctor or pharmacist for more details. Many herbal products interact with warfarin. Tell your doctor before taking any herbal products, especially bromelains, coenzyme Q10, cranberry, danshen, dong quai, fenugreek, garlic, ginkgo biloba, ginseng, and [place]'s wort, among others. This medication may interfere with a certain laboratory test to measure theophylline levels, possibly causing false test results. Make sure laboratory personnel and all your doctors know you use this drug.      OVERDOSE:  If overdose is suspected, contact a poison control center or emergency room immediately.  residents can call the [place] Poison Hotline at 1-274.122.1490. [place] residents can call a provincial poison control center. Symptoms of overdose may include: bloody/black/tarry stools, pink/dark urine, unusual/prolonged bleeding.      NOTES:  Do not share this medication with others. Laboratory and/or medical tests (such as INR, complete blood count) must be performed periodically to monitor your progress or check for side effects. Consult your doctor for more details.      MISSED DOSE:  For the best possible benefit, do not miss any doses. If you do miss a dose and remember on the same day, take it as soon as you remember. If you remember on the next day, skip the missed dose and resume your usual dosing schedule. Do not double the dose to catch up because this could increase your risk for bleeding. Keep a record of missed doses to give to your doctor or pharmacist. Contact your doctor or pharmacist if you miss 2 or more doses in a row.      STORAGE:  Store at room  temperature away from light and moisture. Do not store in the bathroom. Keep all medications away from children and pets. Do not flush medications down the toilet or pour them into a drain unless instructed to do so. Properly discard this product when it is  or no longer needed. Consult your pharmacist or local waste disposal company for more details about how to safely discard your product.      MEDICAL ALERT:  Your condition and medication can cause complications in a medical emergency. For information about enrolling in MedicAlert, call 1-526.540.7574 ([place]) or 1-731.559.6818 ([place]).      Information last revised 2010 Copyright(c)  [employer]             · Is patient Post Blood Transfusion?  No    Depression / Suicide Risk    As you are discharged from this [facility] facility, it is important to learn how to keep safe from harming yourself.    Recognize the warning signs:  · Abrupt changes in personality, positive or negative- including increase in energy   · Giving away possessions  · Change in eating patterns- significant weight changes-  positive or negative  · Change in sleeping patterns- unable to sleep or sleeping all the time   · Unwillingness or inability to communicate  · Depression  · Unusual sadness, discouragement and loneliness  · Talk of wanting to die  · Neglect of personal appearance   · Rebelliousness- reckless behavior  · Withdrawal from people/activities they love  · Confusion- inability to concentrate     If you or a loved one observes any of these behaviors or has concerns about self-harm, here's what you can do:  · Talk about it- your feelings and reasons for harming yourself  · Remove any means that you might use to hurt yourself (examples: pills, rope, extension cords, firearm)  · Get professional help from the community (Mental Health, Substance Abuse, psychological counseling)  · Do not be alone:Call your Safe Contact- someone whom you trust who will be  there for you.  · Call your local CRISIS HOTLINE 836-7001 or 508-430-3711  · Call your local Children's Mobile Crisis Response Team [place] (300) 802-9988 or www.[employer]s  · Call the toll free National Suicide Prevention Hotlines   · National Suicide Prevention Lifeline 208-981-TLGH (1017)  · National [place] Line Network 800-SUICIDE (011-0160)

## 2017-12-29 NOTE — PROGRESS NOTES
Inpatient Anticoagulation Service Note    Date: 12/29/2017  Reason for Anticoagulation: Atrial Fibrillation   [id number] VASc Score: 3    Hemoglobin Value: 15  Hematocrit Value: 46.1  Lab Platelet Value: 214  Target INR: 2.0 to 3.0    INR from last 7 days     Date/Time INR Value    12/28/17 1236 (!)  1.55        Dose from last 7 days     Date/Time Dose (mg)    12/28/17 1236  6        Average Dose (mg):  (Home dose per med rec: 6 mg Wed/Sun, 3 mg all other days)  Significant Interactions: Other (Comments) (SSRI)  Bridge Therapy: No     Comments: Home Coumadin to continue for patient admitted for syncopal episode. Head CT negative for intracranial bleed. INR subtherapeutic on arrival. CHADSVASc at least 3 per chart (HTN, age) - no need to bridge per DW MD. No significant drug interactions noted. Regular diet ordered. Hgb/hct are WNL.     Plan:  6 mg daily to increase INR to within therapeutic range. INR in AM.     Education Material Provided?: No (warfarin PTA )    Pharmacist suggested discharge dosing: consider 6 mg on Sun/Wed/Fri, 3 mg all other days with INR follow up as outpatient within 96 hours of hospital discharge.      Janet Smith, PharmD

## 2017-12-29 NOTE — PROGRESS NOTES
Pt in bed awake,alert,c/o HA wants Tylenol,refused oxycodone,Informed MD,no c/o dizziness,pt ambulates,awaiting for Echo and carotid duplex,poc explained.

## 2017-12-29 NOTE — DISCHARGE PLANNING
Care Transition Team Assessment    Information Source  Orientation : Oriented x 4  Information Given By: Patient  Who is responsible for making decisions for patient? : Patient    Readmission Evaluation  Is this a readmission?: No    Elopement Risk  Legal Hold: No  Ambulatory or Self Mobile in Wheelchair: Yes  Disoriented: No  Psychiatric Symptoms: None  History of Wandering: No  Elopement this Admit: No  Vocalizing Wanting to Leave: No  Displays Behaviors, Body Language Wanting to Leave: No-Not at Risk for Elopement  Elopement Risk: Not at Risk for Elopement    Interdisciplinary Discharge Planning  Does Admitting Nurse Feel This Could be a Complex Discharge?: No  Primary Care Physician: DR TARA ROBERTSON  Lives with - Patient's Self Care Capacity: Spouse  Patient or legal guardian wants to designate a caregiver (see row info): No  Support Systems: Spouse / Significant Other  Housing / Facility: [address]  Do You Take your Prescribed Medications Regularly: Yes  Able to Return to Previous ADL's: Yes  Mobility Issues: No  Prior Services: None  Patient Expects to be Discharged to:: home  Assistance Needed: No  Durable Medical Equipment: Not Applicable              Finances  Financial Barriers to Discharge: No  Prescription Coverage: Yes    Vision / Hearing Impairment  Vision Impairment : Yes  Right Eye Vision: Wears Glasses  Left Eye Vision: Wears Glasses  Hearing Impairment : No    Values / Beliefs / Concerns  Values / Beliefs Concerns : No    Advance Directive  Advance Directive?: DPOA for Health Care  Durable Power of  Name and Contact : pt reports on file here    Domestic Abuse  Have you ever been the victim of abuse or violence?: No  Possible Abuse Reported to:: Not Applicable         Discharge Risks or Barriers  Discharge risks or barriers?: No

## 2017-12-30 NOTE — DISCHARGE SUMMARY
CHIEF COMPLAINT ON ADMISSION:  Weakness.    HISTORY OF PRESENT ILLNESS AND HOSPITAL COURSE:  This is a 77-year-old male,   who presented on 12/28/2017 with dizziness and weakness.  The patient reported   having difficulty ambulating due to the dizziness and presented to the   emergency department.  On admission, echocardiogram revealed left ventricular   ejection fraction of 55% and was otherwise unremarkable.  Carotid ultrasound   was negative for any severe stenosis.  CT of the head revealed findings   consistent with mastoiditis.  The patient was started on antibiotic eardrops.    Over the patient's stay, he was able to ambulate at baseline, and the   dizziness completely resolved.  The patient will complete antibiotic regimen   for his mastoiditis.  As the patient is currently at baseline and has remained   stable.  He will be discharged home in good stable condition.    DISCHARGE PROBLEM LIST:  1.  Mastoiditis.  2.  Near syncope, resolved.  3.  Atrial fibrillation.  4.  Anxiety.  5.  Dizziness, resolved.  6.  Supratherapeutic INR.  7.  Hypertension.    DISCHARGE FOLLOWUP:  The patient is to follow up with his primary care   physician in 1-2 weeks after discharge.    DISCHARGE MEDICATIONS:  1.  Lumigan 0.03% solution, place 1 drop to both eyes every evening.  2.  Simbrinza 1/0.2% suspension, place one drop by ophthalmic 2 times a day.  3.  Lexapro 5 mg tablet, 5 mg by mouth everyday.  4.  Nitroglycerin 0.4 mg sublingual tablet, take 0.4 mg under tongue every 5   minutes as needed for chest pain.  5.  Ofloxacin 0.3% solution, place 5 drops in right ear everyday for 5 days.  6.  Testosterone 25 mg gel, apply 2.5 g to skin as directed everyday.  7.  Timolol 0.5% solution, place 1 drop in both eyes 2 times a day.  8.  Coumadin 3 mg tab, take as instructed.    DIET:  Regular diet.    ACTIVITY:  As tolerated.    LABORATORY DATA:  Sodium 138, potassium 3.7, chloride 108, CO2 of 24, glucose   94, BUN 17, creatinine  0.73.  White blood cell 6.5, hemoglobin 13.1,   hematocrit 40.4, platelet count 86.    Total time of discharge process was approximately 36 minutes.       ____________________________________     CHARLY Richards / NTS    DD:  12/29/2017 14:21:19  DT:  12/29/2017 23:21:14    D#:  [id number]  Job#:  [id number]

## 2018-01-07 LAB — EKG IMPRESSION: NORMAL

## 2018-02-05 ENCOUNTER — HOSPITAL ENCOUNTER (OUTPATIENT)
Dept: LAB | Facility: MEDICAL CENTER | Age: 78
End: 2018-02-05
Attending: INTERNAL MEDICINE
Payer: MEDICARE

## 2018-02-05 LAB
INR PPP: 1.72 (ref 0.87–1.13)
PROTHROMBIN TIME: 19.8 SEC (ref 12–14.6)

## 2018-02-05 PROCEDURE — 36415 COLL VENOUS BLD VENIPUNCTURE: CPT

## 2018-02-05 PROCEDURE — 85610 PROTHROMBIN TIME: CPT

## 2018-03-17 ENCOUNTER — HOSPITAL ENCOUNTER (OUTPATIENT)
Dept: LAB | Facility: MEDICAL CENTER | Age: 78
End: 2018-03-17
Attending: FAMILY MEDICINE
Payer: MEDICARE

## 2018-03-17 LAB
ALBUMIN SERPL BCP-MCNC: 3.8 G/DL (ref 3.2–4.9)
ALBUMIN/GLOB SERPL: 1.5 G/DL
ALP SERPL-CCNC: 75 U/L (ref 30–99)
ALT SERPL-CCNC: 14 U/L (ref 2–50)
ANION GAP SERPL CALC-SCNC: 5 MMOL/L (ref 0–11.9)
AST SERPL-CCNC: 15 U/L (ref 12–45)
BILIRUB SERPL-MCNC: 0.7 MG/DL (ref 0.1–1.5)
BUN SERPL-MCNC: 18 MG/DL (ref 8–22)
CALCIUM SERPL-MCNC: 8.7 MG/DL (ref 8.5–10.5)
CHLORIDE SERPL-SCNC: 108 MMOL/L (ref 96–112)
CHOLEST SERPL-MCNC: 99 MG/DL (ref 100–199)
CO2 SERPL-SCNC: 26 MMOL/L (ref 20–33)
CREAT SERPL-MCNC: 1.01 MG/DL (ref 0.5–1.4)
GLOBULIN SER CALC-MCNC: 2.5 G/DL (ref 1.9–3.5)
GLUCOSE SERPL-MCNC: 89 MG/DL (ref 65–99)
HDLC SERPL-MCNC: 51 MG/DL
LDLC SERPL CALC-MCNC: 38 MG/DL
POTASSIUM SERPL-SCNC: 4.2 MMOL/L (ref 3.6–5.5)
PROT SERPL-MCNC: 6.3 G/DL (ref 6–8.2)
SODIUM SERPL-SCNC: 139 MMOL/L (ref 135–145)
T4 FREE SERPL-MCNC: 1.1 NG/DL (ref 0.53–1.43)
TRIGL SERPL-MCNC: 52 MG/DL (ref 0–149)
TSH SERPL DL<=0.005 MIU/L-ACNC: 1.2 UIU/ML (ref 0.38–5.33)

## 2018-03-17 PROCEDURE — 80053 COMPREHEN METABOLIC PANEL: CPT

## 2018-03-17 PROCEDURE — 80061 LIPID PANEL: CPT

## 2018-03-17 PROCEDURE — 84402 ASSAY OF FREE TESTOSTERONE: CPT

## 2018-03-17 PROCEDURE — 84439 ASSAY OF FREE THYROXINE: CPT

## 2018-03-17 PROCEDURE — 36415 COLL VENOUS BLD VENIPUNCTURE: CPT

## 2018-03-17 PROCEDURE — 84270 ASSAY OF SEX HORMONE GLOBUL: CPT

## 2018-03-17 PROCEDURE — 84443 ASSAY THYROID STIM HORMONE: CPT

## 2018-03-17 PROCEDURE — 84403 ASSAY OF TOTAL TESTOSTERONE: CPT

## 2018-03-19 LAB
SHBG SERPL-SCNC: 80 NMOL/L (ref 11–80)
TESTOST FREE MFR SERPL: 1.1 % (ref 1.6–2.9)
TESTOST FREE SERPL-MCNC: 74 PG/ML (ref 47–244)
TESTOST SERPL-MCNC: 680 NG/DL (ref 300–720)

## 2019-01-16 DIAGNOSIS — Z01.810 PRE-OPERATIVE CARDIOVASCULAR EXAMINATION: ICD-10-CM

## 2019-01-16 DIAGNOSIS — Z01.811 PRE-OPERATIVE RESPIRATORY EXAMINATION: ICD-10-CM

## 2019-01-16 DIAGNOSIS — Z01.812 PRE-OPERATIVE LABORATORY EXAMINATION: ICD-10-CM

## 2019-01-16 LAB
ANION GAP SERPL CALC-SCNC: 7 MMOL/L (ref 0–11.9)
APPEARANCE UR: CLEAR
BACTERIA #/AREA URNS HPF: NEGATIVE /HPF
BASOPHILS # BLD AUTO: 0.6 % (ref 0–1.8)
BASOPHILS # BLD: 0.05 K/UL (ref 0–0.12)
BILIRUB UR QL STRIP.AUTO: NEGATIVE
BUN SERPL-MCNC: 15 MG/DL (ref 8–22)
CALCIUM SERPL-MCNC: 9.1 MG/DL (ref 8.5–10.5)
CHLORIDE SERPL-SCNC: 102 MMOL/L (ref 96–112)
CO2 SERPL-SCNC: 27 MMOL/L (ref 20–33)
COLOR UR: YELLOW
CREAT SERPL-MCNC: 1.02 MG/DL (ref 0.5–1.4)
EOSINOPHIL # BLD AUTO: 0.15 K/UL (ref 0–0.51)
EOSINOPHIL NFR BLD: 1.8 % (ref 0–6.9)
EPI CELLS #/AREA URNS HPF: NEGATIVE /HPF
ERYTHROCYTE [DISTWIDTH] IN BLOOD BY AUTOMATED COUNT: 46.7 FL (ref 35.9–50)
GLUCOSE SERPL-MCNC: 94 MG/DL (ref 65–99)
GLUCOSE UR STRIP.AUTO-MCNC: NEGATIVE MG/DL
HCT VFR BLD AUTO: 47.5 % (ref 42–52)
HGB BLD-MCNC: 16.1 G/DL (ref 14–18)
HYALINE CASTS #/AREA URNS LPF: ABNORMAL /LPF
IMM GRANULOCYTES # BLD AUTO: 0.03 K/UL (ref 0–0.11)
IMM GRANULOCYTES NFR BLD AUTO: 0.4 % (ref 0–0.9)
KETONES UR STRIP.AUTO-MCNC: NEGATIVE MG/DL
LEUKOCYTE ESTERASE UR QL STRIP.AUTO: NEGATIVE
LYMPHOCYTES # BLD AUTO: 1.51 K/UL (ref 1–4.8)
LYMPHOCYTES NFR BLD: 18.3 % (ref 22–41)
MCH RBC QN AUTO: 29.9 PG (ref 27–33)
MCHC RBC AUTO-ENTMCNC: 33.9 G/DL (ref 33.7–35.3)
MCV RBC AUTO: 88.3 FL (ref 81.4–97.8)
MICRO URNS: ABNORMAL
MONOCYTES # BLD AUTO: 0.94 K/UL (ref 0–0.85)
MONOCYTES NFR BLD AUTO: 11.4 % (ref 0–13.4)
NEUTROPHILS # BLD AUTO: 5.55 K/UL (ref 1.82–7.42)
NEUTROPHILS NFR BLD: 67.5 % (ref 44–72)
NITRITE UR QL STRIP.AUTO: NEGATIVE
NRBC # BLD AUTO: 0 K/UL
NRBC BLD-RTO: 0 /100 WBC
PH UR STRIP.AUTO: 6.5 [PH]
PLATELET # BLD AUTO: 188 K/UL (ref 164–446)
PMV BLD AUTO: 10.4 FL (ref 9–12.9)
POTASSIUM SERPL-SCNC: 4 MMOL/L (ref 3.6–5.5)
PROT UR QL STRIP: NEGATIVE MG/DL
RBC # BLD AUTO: 5.38 M/UL (ref 4.7–6.1)
RBC # URNS HPF: ABNORMAL /HPF
RBC UR QL AUTO: ABNORMAL
SCCMEC + MECA PNL NOSE NAA+PROBE: NEGATIVE
SCCMEC + MECA PNL NOSE NAA+PROBE: NEGATIVE
SODIUM SERPL-SCNC: 136 MMOL/L (ref 135–145)
SP GR UR STRIP.AUTO: 1.01
UROBILINOGEN UR STRIP.AUTO-MCNC: 0.2 MG/DL
WBC # BLD AUTO: 8.2 K/UL (ref 4.8–10.8)
WBC #/AREA URNS HPF: ABNORMAL /HPF

## 2019-01-16 PROCEDURE — 87640 STAPH A DNA AMP PROBE: CPT

## 2019-01-16 PROCEDURE — 81001 URINALYSIS AUTO W/SCOPE: CPT

## 2019-01-16 PROCEDURE — 85025 COMPLETE CBC W/AUTO DIFF WBC: CPT

## 2019-01-16 PROCEDURE — 93005 ELECTROCARDIOGRAM TRACING: CPT

## 2019-01-16 PROCEDURE — 87641 MR-STAPH DNA AMP PROBE: CPT

## 2019-01-16 PROCEDURE — 36415 COLL VENOUS BLD VENIPUNCTURE: CPT

## 2019-01-16 PROCEDURE — 80048 BASIC METABOLIC PNL TOTAL CA: CPT

## 2019-01-16 RX ORDER — ALUMINUM ZIRCONIUM OCTACHLOROHYDREX GLY 16 G/100G
1 GEL TOPICAL DAILY
COMMUNITY
End: 2020-11-02

## 2019-01-16 RX ORDER — CHLORAL HYDRATE 500 MG
1000 CAPSULE ORAL DAILY
Status: ON HOLD | COMMUNITY
End: 2020-06-04 | Stop reason: SDUPTHER

## 2019-01-16 RX ORDER — CHOLECALCIFEROL (VITAMIN D3) 125 MCG
1 CAPSULE ORAL
COMMUNITY
End: 2020-06-03

## 2019-01-16 RX ORDER — ACETAMINOPHEN 160 MG
1 TABLET,DISINTEGRATING ORAL DAILY
COMMUNITY
End: 2023-06-14

## 2019-01-16 RX ORDER — MULTIVITAMIN WITH IRON
1 TABLET ORAL EVERY EVENING
COMMUNITY
End: 2020-06-03

## 2019-01-16 RX ORDER — DORZOLAMIDE HYDROCHLORIDE AND TIMOLOL MALEATE 20; 5 MG/ML; MG/ML
1 SOLUTION/ DROPS OPHTHALMIC DAILY
COMMUNITY
End: 2020-11-02

## 2019-01-16 RX ORDER — ATORVASTATIN CALCIUM 10 MG/1
10 TABLET, FILM COATED ORAL NIGHTLY
COMMUNITY
End: 2024-01-31

## 2019-01-16 RX ORDER — DICLOFENAC SODIUM 75 MG/1
75 TABLET, DELAYED RELEASE ORAL PRN
Status: ON HOLD | COMMUNITY
End: 2019-01-21

## 2019-01-16 RX ORDER — ACETAMINOPHEN 500 MG
1000 TABLET ORAL EVERY 6 HOURS PRN
COMMUNITY
End: 2020-11-02

## 2019-01-16 RX ORDER — BIOTIN 2500 MCG
1 CAPSULE ORAL DAILY
COMMUNITY

## 2019-01-16 RX ORDER — LEVOTHYROXINE SODIUM 0.03 MG/1
25 TABLET ORAL
COMMUNITY

## 2019-01-16 RX ORDER — TADALAFIL 20 MG/1
20 TABLET ORAL PRN
COMMUNITY
End: 2020-12-23

## 2019-01-16 NOTE — DISCHARGE PLANNING
DISCHARGE PLANNING NOTE - TOTAL JOINT     Procedure: Procedure(s):  KNEE ARTHROPLASTY TOTAL  Procedure Date: 1/21/2019  Insurance:  Payor: MEDICARE / Plan: MEDICARE PART A & B   Equipment currently available at home? cane , crutches  Steps into the home? 1  Steps within the home? 0  Toilet height? ADA  Type of shower? walk-in shower  Who will be with you during your recovery? spouse  Is Outpatient Physical Therapy set up after surgery? No   Did you take the Total Joint Class and where? Yes, at [facility]     Plan: Patient will need a FWW and signed the choice form for [facility]; form scanned into Media tab. Reviewed Equipment Resource list and provided a copy to the patient. Recommended a shower chair and raised toilet seat. Provided Home Safety Checklist. Anticipate discharge home.

## 2019-01-17 LAB — EKG IMPRESSION: NORMAL

## 2019-01-17 PROCEDURE — 93010 ELECTROCARDIOGRAM REPORT: CPT | Performed by: INTERNAL MEDICINE

## 2019-01-21 ENCOUNTER — HOSPITAL ENCOUNTER (OUTPATIENT)
Facility: MEDICAL CENTER | Age: 79
End: 2019-01-22
Attending: ORTHOPAEDIC SURGERY | Admitting: ORTHOPAEDIC SURGERY
Payer: MEDICARE

## 2019-01-21 ENCOUNTER — APPOINTMENT (OUTPATIENT)
Dept: RADIOLOGY | Facility: MEDICAL CENTER | Age: 79
End: 2019-01-21
Attending: PHYSICIAN ASSISTANT
Payer: MEDICARE

## 2019-01-21 DIAGNOSIS — G89.18 ACUTE POST-OPERATIVE PAIN: ICD-10-CM

## 2019-01-21 DIAGNOSIS — M17.12 ARTHRITIS OF LEFT KNEE: ICD-10-CM

## 2019-01-21 DIAGNOSIS — Z96.652 STATUS POST LEFT KNEE REPLACEMENT: ICD-10-CM

## 2019-01-21 PROCEDURE — 96365 THER/PROPH/DIAG IV INF INIT: CPT

## 2019-01-21 PROCEDURE — 700101 HCHG RX REV CODE 250: Performed by: PHYSICIAN ASSISTANT

## 2019-01-21 PROCEDURE — 160035 HCHG PACU - 1ST 60 MINS PHASE I: Performed by: ORTHOPAEDIC SURGERY

## 2019-01-21 PROCEDURE — 501838 HCHG SUTURE GENERAL: Performed by: ORTHOPAEDIC SURGERY

## 2019-01-21 PROCEDURE — 96376 TX/PRO/DX INJ SAME DRUG ADON: CPT

## 2019-01-21 PROCEDURE — 160036 HCHG PACU - EA ADDL 30 MINS PHASE I: Performed by: ORTHOPAEDIC SURGERY

## 2019-01-21 PROCEDURE — 96375 TX/PRO/DX INJ NEW DRUG ADDON: CPT

## 2019-01-21 PROCEDURE — L8699 PROSTHETIC IMPLANT NOS: HCPCS | Performed by: ORTHOPAEDIC SURGERY

## 2019-01-21 PROCEDURE — A9270 NON-COVERED ITEM OR SERVICE: HCPCS | Performed by: PHYSICIAN ASSISTANT

## 2019-01-21 PROCEDURE — 73560 X-RAY EXAM OF KNEE 1 OR 2: CPT | Mod: LT

## 2019-01-21 PROCEDURE — 700101 HCHG RX REV CODE 250: Performed by: ORTHOPAEDIC SURGERY

## 2019-01-21 PROCEDURE — 700105 HCHG RX REV CODE 258: Performed by: ORTHOPAEDIC SURGERY

## 2019-01-21 PROCEDURE — 700102 HCHG RX REV CODE 250 W/ 637 OVERRIDE(OP): Performed by: PHYSICIAN ASSISTANT

## 2019-01-21 PROCEDURE — 700112 HCHG RX REV CODE 229: Performed by: PHYSICIAN ASSISTANT

## 2019-01-21 PROCEDURE — 160022 HCHG BLOCK: Performed by: ORTHOPAEDIC SURGERY

## 2019-01-21 PROCEDURE — G0378 HOSPITAL OBSERVATION PER HR: HCPCS

## 2019-01-21 PROCEDURE — A9270 NON-COVERED ITEM OR SERVICE: HCPCS | Performed by: ANESTHESIOLOGY

## 2019-01-21 PROCEDURE — 700101 HCHG RX REV CODE 250

## 2019-01-21 PROCEDURE — 160048 HCHG OR STATISTICAL LEVEL 1-5: Performed by: ORTHOPAEDIC SURGERY

## 2019-01-21 PROCEDURE — 160029 HCHG SURGERY MINUTES - 1ST 30 MINS LEVEL 4: Performed by: ORTHOPAEDIC SURGERY

## 2019-01-21 PROCEDURE — 700111 HCHG RX REV CODE 636 W/ 250 OVERRIDE (IP): Performed by: ANESTHESIOLOGY

## 2019-01-21 PROCEDURE — 160041 HCHG SURGERY MINUTES - EA ADDL 1 MIN LEVEL 4: Performed by: ORTHOPAEDIC SURGERY

## 2019-01-21 PROCEDURE — 502579 HCHG PACK, TOTAL KNEE: Performed by: ORTHOPAEDIC SURGERY

## 2019-01-21 PROCEDURE — 502000 HCHG MISC OR IMPLANTS RC 0278: Performed by: ORTHOPAEDIC SURGERY

## 2019-01-21 PROCEDURE — 700111 HCHG RX REV CODE 636 W/ 250 OVERRIDE (IP): Performed by: PHYSICIAN ASSISTANT

## 2019-01-21 PROCEDURE — 160002 HCHG RECOVERY MINUTES (STAT): Performed by: ORTHOPAEDIC SURGERY

## 2019-01-21 PROCEDURE — 700111 HCHG RX REV CODE 636 W/ 250 OVERRIDE (IP)

## 2019-01-21 PROCEDURE — 160009 HCHG ANES TIME/MIN: Performed by: ORTHOPAEDIC SURGERY

## 2019-01-21 PROCEDURE — 700102 HCHG RX REV CODE 250 W/ 637 OVERRIDE(OP): Performed by: ANESTHESIOLOGY

## 2019-01-21 PROCEDURE — 700105 HCHG RX REV CODE 258: Performed by: PHYSICIAN ASSISTANT

## 2019-01-21 DEVICE — IMPLANT LEGION CR NP FEM SZ 7 LT: Type: IMPLANTABLE DEVICE | Site: KNEE | Status: FUNCTIONAL

## 2019-01-21 DEVICE — PATELLA GII OVAL RESURFACING PAT 35MM (1EA): Type: IMPLANTABLE DEVICE | Site: KNEE | Status: FUNCTIONAL

## 2019-01-21 DEVICE — IMPLANTABLE DEVICE: Type: IMPLANTABLE DEVICE | Site: KNEE | Status: FUNCTIONAL

## 2019-01-21 DEVICE — BONE CEMENT SIMPLEX FULL DOSE - (10EA/PK): Type: IMPLANTABLE DEVICE | Site: KNEE | Status: FUNCTIONAL

## 2019-01-21 RX ORDER — DEXAMETHASONE SODIUM PHOSPHATE 4 MG/ML
4 INJECTION, SOLUTION INTRA-ARTICULAR; INTRALESIONAL; INTRAMUSCULAR; INTRAVENOUS; SOFT TISSUE
Status: DISCONTINUED | OUTPATIENT
Start: 2019-01-21 | End: 2019-01-22 | Stop reason: HOSPADM

## 2019-01-21 RX ORDER — HYDROMORPHONE HYDROCHLORIDE 1 MG/ML
0.1 INJECTION, SOLUTION INTRAMUSCULAR; INTRAVENOUS; SUBCUTANEOUS
Status: DISCONTINUED | OUTPATIENT
Start: 2019-01-21 | End: 2019-01-21 | Stop reason: HOSPADM

## 2019-01-21 RX ORDER — CEFAZOLIN SODIUM 2 G/100ML
2 INJECTION, SOLUTION INTRAVENOUS EVERY 8 HOURS
Status: COMPLETED | OUTPATIENT
Start: 2019-01-21 | End: 2019-01-22

## 2019-01-21 RX ORDER — DIPHENHYDRAMINE HYDROCHLORIDE 50 MG/ML
25 INJECTION INTRAMUSCULAR; INTRAVENOUS EVERY 6 HOURS PRN
Status: DISCONTINUED | OUTPATIENT
Start: 2019-01-21 | End: 2019-01-22 | Stop reason: HOSPADM

## 2019-01-21 RX ORDER — MAGNESIUM HYDROXIDE 1200 MG/15ML
LIQUID ORAL
Status: COMPLETED | OUTPATIENT
Start: 2019-01-21 | End: 2019-01-21

## 2019-01-21 RX ORDER — MEPERIDINE HYDROCHLORIDE 25 MG/ML
6.25 INJECTION INTRAMUSCULAR; INTRAVENOUS; SUBCUTANEOUS
Status: DISCONTINUED | OUTPATIENT
Start: 2019-01-21 | End: 2019-01-21 | Stop reason: HOSPADM

## 2019-01-21 RX ORDER — DOCUSATE SODIUM 100 MG/1
100 CAPSULE, LIQUID FILLED ORAL 2 TIMES DAILY
Status: DISCONTINUED | OUTPATIENT
Start: 2019-01-21 | End: 2019-01-22 | Stop reason: HOSPADM

## 2019-01-21 RX ORDER — ONDANSETRON 2 MG/ML
4 INJECTION INTRAMUSCULAR; INTRAVENOUS EVERY 4 HOURS PRN
Status: DISCONTINUED | OUTPATIENT
Start: 2019-01-21 | End: 2019-01-22 | Stop reason: HOSPADM

## 2019-01-21 RX ORDER — AMOXICILLIN 250 MG
1 CAPSULE ORAL NIGHTLY
Status: DISCONTINUED | OUTPATIENT
Start: 2019-01-21 | End: 2019-01-22 | Stop reason: HOSPADM

## 2019-01-21 RX ORDER — PROCHLORPERAZINE MALEATE 10 MG
10 TABLET ORAL EVERY 6 HOURS PRN
Status: DISCONTINUED | OUTPATIENT
Start: 2019-01-21 | End: 2019-01-22 | Stop reason: HOSPADM

## 2019-01-21 RX ORDER — CHLORPROMAZINE HYDROCHLORIDE 10 MG/1
25 TABLET, FILM COATED ORAL EVERY 6 HOURS PRN
Status: DISCONTINUED | OUTPATIENT
Start: 2019-01-21 | End: 2019-01-22 | Stop reason: HOSPADM

## 2019-01-21 RX ORDER — ACETAMINOPHEN 325 MG/1
650 TABLET ORAL ONCE
Status: COMPLETED | OUTPATIENT
Start: 2019-01-21 | End: 2019-01-21

## 2019-01-21 RX ORDER — TRAMADOL HYDROCHLORIDE 50 MG/1
50 TABLET ORAL EVERY 4 HOURS PRN
Status: DISCONTINUED | OUTPATIENT
Start: 2019-01-21 | End: 2019-01-22 | Stop reason: HOSPADM

## 2019-01-21 RX ORDER — AMOXICILLIN 250 MG
1 CAPSULE ORAL
Status: DISCONTINUED | OUTPATIENT
Start: 2019-01-21 | End: 2019-01-22 | Stop reason: HOSPADM

## 2019-01-21 RX ORDER — HYDROMORPHONE HYDROCHLORIDE 1 MG/ML
0.5 INJECTION, SOLUTION INTRAMUSCULAR; INTRAVENOUS; SUBCUTANEOUS
Status: DISCONTINUED | OUTPATIENT
Start: 2019-01-21 | End: 2019-01-22 | Stop reason: HOSPADM

## 2019-01-21 RX ORDER — PROMETHAZINE HYDROCHLORIDE 25 MG/1
25 SUPPOSITORY RECTAL EVERY 6 HOURS PRN
Status: DISCONTINUED | OUTPATIENT
Start: 2019-01-21 | End: 2019-01-22 | Stop reason: HOSPADM

## 2019-01-21 RX ORDER — HYDRALAZINE HYDROCHLORIDE 20 MG/ML
5 INJECTION INTRAMUSCULAR; INTRAVENOUS
Status: DISCONTINUED | OUTPATIENT
Start: 2019-01-21 | End: 2019-01-21 | Stop reason: HOSPADM

## 2019-01-21 RX ORDER — DIPHENHYDRAMINE HYDROCHLORIDE 50 MG/ML
12.5 INJECTION INTRAMUSCULAR; INTRAVENOUS
Status: DISCONTINUED | OUTPATIENT
Start: 2019-01-21 | End: 2019-01-21 | Stop reason: HOSPADM

## 2019-01-21 RX ORDER — DEXTROSE AND SODIUM CHLORIDE 5; .45 G/100ML; G/100ML
INJECTION, SOLUTION INTRAVENOUS CONTINUOUS
Status: DISCONTINUED | OUTPATIENT
Start: 2019-01-21 | End: 2019-01-22 | Stop reason: HOSPADM

## 2019-01-21 RX ORDER — HYDROMORPHONE HYDROCHLORIDE 1 MG/ML
0.4 INJECTION, SOLUTION INTRAMUSCULAR; INTRAVENOUS; SUBCUTANEOUS
Status: DISCONTINUED | OUTPATIENT
Start: 2019-01-21 | End: 2019-01-21 | Stop reason: HOSPADM

## 2019-01-21 RX ORDER — HALOPERIDOL 5 MG/ML
1 INJECTION INTRAMUSCULAR
Status: DISCONTINUED | OUTPATIENT
Start: 2019-01-21 | End: 2019-01-21 | Stop reason: HOSPADM

## 2019-01-21 RX ORDER — BUPIVACAINE HYDROCHLORIDE AND EPINEPHRINE 2.5; 5 MG/ML; UG/ML
INJECTION, SOLUTION EPIDURAL; INFILTRATION; INTRACAUDAL; PERINEURAL
Status: DISCONTINUED | OUTPATIENT
Start: 2019-01-21 | End: 2019-01-21 | Stop reason: HOSPADM

## 2019-01-21 RX ORDER — HYDROCODONE BITARTRATE AND ACETAMINOPHEN 10; 325 MG/1; MG/1
.5-1 TABLET ORAL EVERY 4 HOURS PRN
Status: DISCONTINUED | OUTPATIENT
Start: 2019-01-21 | End: 2019-01-22 | Stop reason: HOSPADM

## 2019-01-21 RX ORDER — HYDROMORPHONE HYDROCHLORIDE 1 MG/ML
0.2 INJECTION, SOLUTION INTRAMUSCULAR; INTRAVENOUS; SUBCUTANEOUS
Status: DISCONTINUED | OUTPATIENT
Start: 2019-01-21 | End: 2019-01-21 | Stop reason: HOSPADM

## 2019-01-21 RX ORDER — KETOROLAC TROMETHAMINE 30 MG/ML
15 INJECTION, SOLUTION INTRAMUSCULAR; INTRAVENOUS EVERY 8 HOURS
Status: DISCONTINUED | OUTPATIENT
Start: 2019-01-21 | End: 2019-01-22 | Stop reason: HOSPADM

## 2019-01-21 RX ORDER — DEXAMETHASONE SODIUM PHOSPHATE 4 MG/ML
6 INJECTION, SOLUTION INTRA-ARTICULAR; INTRALESIONAL; INTRAMUSCULAR; INTRAVENOUS; SOFT TISSUE ONCE
Status: COMPLETED | OUTPATIENT
Start: 2019-01-22 | End: 2019-01-22

## 2019-01-21 RX ORDER — TAMSULOSIN HYDROCHLORIDE 0.4 MG/1
0.4 CAPSULE ORAL DAILY
Status: DISCONTINUED | OUTPATIENT
Start: 2019-01-21 | End: 2019-01-22 | Stop reason: HOSPADM

## 2019-01-21 RX ORDER — ACETAMINOPHEN 650 MG
TABLET, EXTENDED RELEASE ORAL
Status: DISCONTINUED | OUTPATIENT
Start: 2019-01-21 | End: 2019-01-21 | Stop reason: HOSPADM

## 2019-01-21 RX ORDER — ONDANSETRON 4 MG/1
4 TABLET, ORALLY DISINTEGRATING ORAL EVERY 4 HOURS PRN
Status: DISCONTINUED | OUTPATIENT
Start: 2019-01-21 | End: 2019-01-22 | Stop reason: HOSPADM

## 2019-01-21 RX ORDER — DIPHENHYDRAMINE HCL 25 MG
25 TABLET ORAL EVERY 6 HOURS PRN
Status: DISCONTINUED | OUTPATIENT
Start: 2019-01-21 | End: 2019-01-22 | Stop reason: HOSPADM

## 2019-01-21 RX ORDER — HALOPERIDOL 5 MG/ML
1 INJECTION INTRAMUSCULAR EVERY 6 HOURS PRN
Status: DISCONTINUED | OUTPATIENT
Start: 2019-01-21 | End: 2019-01-22 | Stop reason: HOSPADM

## 2019-01-21 RX ORDER — OXYCODONE HCL 5 MG/5 ML
5 SOLUTION, ORAL ORAL
Status: COMPLETED | OUTPATIENT
Start: 2019-01-21 | End: 2019-01-21

## 2019-01-21 RX ORDER — BISACODYL 10 MG
10 SUPPOSITORY, RECTAL RECTAL
Status: DISCONTINUED | OUTPATIENT
Start: 2019-01-21 | End: 2019-01-22 | Stop reason: HOSPADM

## 2019-01-21 RX ORDER — ENEMA 19; 7 G/133ML; G/133ML
1 ENEMA RECTAL
Status: DISCONTINUED | OUTPATIENT
Start: 2019-01-21 | End: 2019-01-22 | Stop reason: HOSPADM

## 2019-01-21 RX ORDER — ZOLPIDEM TARTRATE 5 MG/1
5 TABLET ORAL NIGHTLY PRN
Status: DISCONTINUED | OUTPATIENT
Start: 2019-01-22 | End: 2019-01-22 | Stop reason: HOSPADM

## 2019-01-21 RX ORDER — OXYCODONE HCL 5 MG/5 ML
10 SOLUTION, ORAL ORAL
Status: COMPLETED | OUTPATIENT
Start: 2019-01-21 | End: 2019-01-21

## 2019-01-21 RX ORDER — POLYETHYLENE GLYCOL 3350 17 G/17G
1 POWDER, FOR SOLUTION ORAL 2 TIMES DAILY PRN
Status: DISCONTINUED | OUTPATIENT
Start: 2019-01-21 | End: 2019-01-22 | Stop reason: HOSPADM

## 2019-01-21 RX ORDER — SODIUM CHLORIDE, SODIUM LACTATE, POTASSIUM CHLORIDE, CALCIUM CHLORIDE 600; 310; 30; 20 MG/100ML; MG/100ML; MG/100ML; MG/100ML
INJECTION, SOLUTION INTRAVENOUS CONTINUOUS
Status: DISCONTINUED | OUTPATIENT
Start: 2019-01-21 | End: 2019-01-21 | Stop reason: HOSPADM

## 2019-01-21 RX ORDER — CEFAZOLIN SODIUM 2 G/100ML
2 INJECTION, SOLUTION INTRAVENOUS ONCE
Status: DISCONTINUED | OUTPATIENT
Start: 2019-01-21 | End: 2019-01-21 | Stop reason: HOSPADM

## 2019-01-21 RX ORDER — ONDANSETRON 2 MG/ML
4 INJECTION INTRAMUSCULAR; INTRAVENOUS
Status: DISCONTINUED | OUTPATIENT
Start: 2019-01-21 | End: 2019-01-21 | Stop reason: HOSPADM

## 2019-01-21 RX ORDER — SCOLOPAMINE TRANSDERMAL SYSTEM 1 MG/1
1 PATCH, EXTENDED RELEASE TRANSDERMAL
Status: DISCONTINUED | OUTPATIENT
Start: 2019-01-21 | End: 2019-01-22 | Stop reason: HOSPADM

## 2019-01-21 RX ORDER — SODIUM CHLORIDE, SODIUM LACTATE, POTASSIUM CHLORIDE, CALCIUM CHLORIDE 600; 310; 30; 20 MG/100ML; MG/100ML; MG/100ML; MG/100ML
INJECTION, SOLUTION INTRAVENOUS ONCE
Status: COMPLETED | OUTPATIENT
Start: 2019-01-21 | End: 2019-01-21

## 2019-01-21 RX ORDER — KETOROLAC TROMETHAMINE 30 MG/ML
INJECTION, SOLUTION INTRAMUSCULAR; INTRAVENOUS
Status: DISCONTINUED | OUTPATIENT
Start: 2019-01-21 | End: 2019-01-21 | Stop reason: HOSPADM

## 2019-01-21 RX ORDER — DIAZEPAM 5 MG/1
5 TABLET ORAL EVERY 6 HOURS PRN
Status: DISCONTINUED | OUTPATIENT
Start: 2019-01-21 | End: 2019-01-22 | Stop reason: HOSPADM

## 2019-01-21 RX ORDER — CHLORPROMAZINE HYDROCHLORIDE 25 MG/ML
25 INJECTION INTRAMUSCULAR EVERY 6 HOURS PRN
Status: DISCONTINUED | OUTPATIENT
Start: 2019-01-21 | End: 2019-01-22 | Stop reason: HOSPADM

## 2019-01-21 RX ADMIN — KETOROLAC TROMETHAMINE 15 MG: 30 INJECTION, SOLUTION INTRAMUSCULAR; INTRAVENOUS at 15:48

## 2019-01-21 RX ADMIN — HYDROMORPHONE HYDROCHLORIDE 0.2 MG: 1 INJECTION, SOLUTION INTRAMUSCULAR; INTRAVENOUS; SUBCUTANEOUS at 14:11

## 2019-01-21 RX ADMIN — FENTANYL CITRATE 50 MCG: 50 INJECTION, SOLUTION INTRAMUSCULAR; INTRAVENOUS at 14:04

## 2019-01-21 RX ADMIN — HYDROMORPHONE HYDROCHLORIDE 0.4 MG: 1 INJECTION, SOLUTION INTRAMUSCULAR; INTRAVENOUS; SUBCUTANEOUS at 13:52

## 2019-01-21 RX ADMIN — HYDROMORPHONE HYDROCHLORIDE 0.4 MG: 1 INJECTION, SOLUTION INTRAMUSCULAR; INTRAVENOUS; SUBCUTANEOUS at 14:05

## 2019-01-21 RX ADMIN — OXYCODONE HYDROCHLORIDE 10 MG: 5 SOLUTION ORAL at 13:47

## 2019-01-21 RX ADMIN — TRANEXAMIC ACID 1000 MG: 100 INJECTION, SOLUTION INTRAVENOUS at 14:00

## 2019-01-21 RX ADMIN — KETOROLAC TROMETHAMINE 15 MG: 30 INJECTION, SOLUTION INTRAMUSCULAR; INTRAVENOUS at 22:00

## 2019-01-21 RX ADMIN — TAMSULOSIN HYDROCHLORIDE 0.4 MG: 0.4 CAPSULE ORAL at 15:47

## 2019-01-21 RX ADMIN — DOCUSATE SODIUM 100 MG: 100 CAPSULE, LIQUID FILLED ORAL at 17:30

## 2019-01-21 RX ADMIN — FENTANYL CITRATE 50 MCG: 50 INJECTION, SOLUTION INTRAMUSCULAR; INTRAVENOUS at 13:47

## 2019-01-21 RX ADMIN — SENNOSIDES AND DOCUSATE SODIUM 1 TABLET: 8.6; 5 TABLET ORAL at 22:07

## 2019-01-21 RX ADMIN — ACETAMINOPHEN 650 MG: 325 TABLET, FILM COATED ORAL at 10:50

## 2019-01-21 RX ADMIN — CEFAZOLIN SODIUM 2 G: 2 INJECTION, SOLUTION INTRAVENOUS at 21:30

## 2019-01-21 RX ADMIN — SODIUM CHLORIDE, SODIUM LACTATE, POTASSIUM CHLORIDE, CALCIUM CHLORIDE: 600; 310; 30; 20 INJECTION, SOLUTION INTRAVENOUS at 10:49

## 2019-01-21 RX ADMIN — DEXTROSE AND SODIUM CHLORIDE: 5; 450 INJECTION, SOLUTION INTRAVENOUS at 15:49

## 2019-01-21 ASSESSMENT — PAIN SCALES - GENERAL
PAINLEVEL_OUTOF10: 5
PAINLEVEL_OUTOF10: 6
PAINLEVEL_OUTOF10: 4
PAINLEVEL_OUTOF10: 6
PAINLEVEL_OUTOF10: 4
PAINLEVEL_OUTOF10: 5

## 2019-01-21 ASSESSMENT — COGNITIVE AND FUNCTIONAL STATUS - GENERAL
MOVING TO AND FROM BED TO CHAIR: A LITTLE
WALKING IN HOSPITAL ROOM: A LITTLE
MOVING FROM LYING ON BACK TO SITTING ON SIDE OF FLAT BED: A LITTLE
STANDING UP FROM CHAIR USING ARMS: A LITTLE
SUGGESTED CMS G CODE MODIFIER DAILY ACTIVITY: CJ
CLIMB 3 TO 5 STEPS WITH RAILING: A LITTLE
DRESSING REGULAR LOWER BODY CLOTHING: A LITTLE
HELP NEEDED FOR BATHING: A LITTLE
MOBILITY SCORE: 18
DAILY ACTIVITIY SCORE: 21
SUGGESTED CMS G CODE MODIFIER MOBILITY: CK
TURNING FROM BACK TO SIDE WHILE IN FLAT BAD: A LITTLE
TOILETING: A LITTLE

## 2019-01-21 ASSESSMENT — PATIENT HEALTH QUESTIONNAIRE - PHQ9
1. LITTLE INTEREST OR PLEASURE IN DOING THINGS: NOT AT ALL
SUM OF ALL RESPONSES TO PHQ9 QUESTIONS 1 AND 2: 0
2. FEELING DOWN, DEPRESSED, IRRITABLE, OR HOPELESS: NOT AT ALL

## 2019-01-21 ASSESSMENT — COPD QUESTIONNAIRES
COPD SCREENING SCORE: 4
DURING THE PAST 4 WEEKS HOW MUCH DID YOU FEEL SHORT OF BREATH: NONE/LITTLE OF THE TIME
DO YOU EVER COUGH UP ANY MUCUS OR PHLEGM?: NO/ONLY WITH OCCASIONAL COLDS OR INFECTIONS
HAVE YOU SMOKED AT LEAST 100 CIGARETTES IN YOUR ENTIRE LIFE: YES

## 2019-01-21 ASSESSMENT — LIFESTYLE VARIABLES
EVER_SMOKED: YES
EVER_SMOKED: YES

## 2019-01-21 NOTE — H&P
CHIEF COMPLAINT:  Left knee pain.    HISTORY OF PRESENT ILLNESS:  The patient is a pleasant 78-year-old male.  He   is a patient of Dr. Oliveira's.  He has suffered with severe left knee arthritis   for quite some time.  He has tried and failed conservative measures.  His   plan is to proceed with left total knee arthroplasty by Dr. Oliveira on the date   of admission.    ALLERGIES:  No known drug allergies.    HOME MEDICATIONS:  Levothyroxine, atorvastatin, escitalopram, timolol,   testosterone gel and diclofenac.    PAST MEDICAL HISTORY:  Significant for arthritis, hypothyroidism,   dyslipidemia, anxiety and glaucoma.    SOCIAL HISTORY:  He is a  and lives with his wife.  He does not smoke.    He drinks alcohol socially.    FAMILY HISTORY:  Noncontributory.    REVIEW OF SYSTEMS:  Negative per CMS guidelines.    PHYSICAL EXAMINATION:  GENERAL:  He is a well-nourished, well-developed 78-year-old male in no acute   distress.  He is alert and oriented x3.  HEENT:  Eyes are PERRL.  SKIN:  Intact.  Warm, pink, and dry.  LUNGS:  Clear to auscultation bilaterally.  HEART:  Regular rate and rhythm.  ABDOMEN:  Soft, nontender, nondistended.  EXTREMITIES:  On examination of his left knee, he has no effusion, no palpable   warmth.  Knee is stable.  No obvious laxity.  Tenderness to palpation in   medial and lateral joint lines.  Well maintained active range of motion and   strength.  CMS is intact.    RADIOGRAPHS:  Four-view of the left knee shows severe joint space narrowing   with osteophyte formation and subchondral sclerosis.    IMPRESSION:  Severe left knee arthritis.    PLAN:  Plan is to proceed with left total knee arthroplasty by Dr. Oliveira on   the date of admission.  Risks versus benefits and treatment alternatives have   been discussed.  He has been consented for the procedure.  He understands   there are no guarantees with surgery.       ____________________________________     AYDE BO /  PEPE    DD:  01/20/2019 20:45:04  DT:  01/20/2019 21:10:21    D#:  [id number]  Job#:  [id number]

## 2019-01-21 NOTE — CARE PLAN
Problem: Safety  Goal: Will remain free from falls    Intervention: Assess risk factors for falls  Discussed safety and fall risk. Safety and fall precautions in place, bed/chair alarm in use, call light within reach, bed locked in lowest position, non-skid socks in place, clutter-free environment, DME in bathroom. Patient verbalized understanding of safety and fall risk and uses call light appropriately for assistance.      Problem: Pain Management  Goal: Pain level will decrease to patient's comfort goal    Intervention: Follow pain managment plan developed in collaboration with patient and Interdisciplinary Team  Provided patient education on pain management using pain scale. Patient states pain is 4/10; medicated per MAR. Polar ice in place to left knee. Provided extra pillows for support and positioning. Patient verbalized understanding of education and communicates pain level effectively with RN.

## 2019-01-21 NOTE — OR SURGEON
Immediate Post OP Note    PreOp Diagnosis: DJD L hip    PostOp Diagnosis: same    Procedure(s):  Left KNEE ARTHROPLASTY TOTAL - Wound Class: Clean    Surgeon(s):  Dennis Oliveira M.D.    Anesthesiologist/Type of Anesthesia:  Anesthesiologist: Mat Brown M.D./General    Surgical Staff:  Assistant: ONEYDA Saul  Circulator: Tammi Santos R.N.  Relief Circulator: Liz Jimenez RHARLEY  Scrub Person: Joey Ellis    Specimens removed if any:  * No specimens in log *    Estimated Blood Loss: 100    Findings: severe PF disease, moderate medial and lateral disease    Complications: none        1/21/2019 1:31 PM Dennis Oliveira M.D.

## 2019-01-21 NOTE — H&P
DATE OF ADMISSION:  01/21/2019    CHIEF COMPLAINT:  Left knee pain.    HISTORY OF PRESENT ILLNESS:  The patient is a pleasant 78-year-old male.  He   is a patient of Dr. Oliveira's.  He has suffered with severe left knee arthritis   for quite some time.  He has tried and failed conservative measures.  His   plan is to proceed with left total knee arthroplasty by Dr. Oliveira on the date   of admission.    ALLERGIES:  No known drug allergies.    HOME MEDICATIONS:  Levothyroxine, atorvastatin, escitalopram, timolol,   testosterone gel and diclofenac.    PAST MEDICAL HISTORY:  Significant for arthritis, hypothyroidism,   dyslipidemia, anxiety and glaucoma.    SOCIAL HISTORY:  He is a  and lives with his wife.  He does not smoke.    He drinks alcohol socially.    FAMILY HISTORY:  Noncontributory.    REVIEW OF SYSTEMS:  Negative per CMS guidelines.    PHYSICAL EXAMINATION:  GENERAL:  He is a well-nourished, well-developed 78-year-old male in no acute   distress.  He is alert and oriented x3.  HEENT:  Eyes are PERRL.  SKIN:  Intact.  Warm, pink, and dry.  LUNGS:  Clear to auscultation bilaterally.  HEART:  Regular rate and rhythm.  ABDOMEN:  Soft, nontender, nondistended.  EXTREMITIES:  On examination of his left knee, he has no effusion, no palpable   warmth.  Knee is stable.  No obvious laxity.  Tenderness to palpation in   medial and lateral joint lines.  Well maintained active range of motion and   strength.  CMS is intact.    RADIOGRAPHS:  Four-view of the left knee shows severe joint space narrowing   with osteophyte formation and subchondral sclerosis.    IMPRESSION:  Severe left knee arthritis.    PLAN:  Plan is to proceed with left total knee arthroplasty by Dr. Oliveira on   the date of admission.  Risks versus benefits and treatment alternatives have   been discussed.  He has been consented for the procedure.  He understands   there are no guarantees with surgery.       ____________________________________      AYDE BO / PEPE    DD:  01/20/2019 20:45:04  DT:  01/20/2019 21:10:21    D#:  [id number]  Job#:  [id number]

## 2019-01-21 NOTE — PROGRESS NOTES
Received report from RIC Cabrera. Patient arrived to unit via transport. Patient is alert and oriented. Vital signs stable on 10L oxymask. Oriented patient to room and discussed plan of care, pain management, safety, and fall risk. Wife at the bedside. Patient states pain is 4/10; medicated per MAR. Surgical dressing and polar ice to left knee. SCDs in place. Safety and fall precautions in place, call light within reach, bed locked in lowest position. Patient instructed to call for assistance before getting out of bed. All needs met at this time.

## 2019-01-21 NOTE — OP REPORT
DATE OF SERVICE:  01/21/2019    PREOPERATIVE DIAGNOSIS:  Degenerative arthritis, left knee.    POSTOPERATIVE DIAGNOSIS:  Degenerative arthritis, left knee.    PROCEDURE PERFORMED:  Left total knee arthroplasty.    SURGEON:  Dennis Oliveira MD    ANESTHESIA:  General with adductor canal block.    ANESTHESIOLOGIST:  Mat Brown MD    ASSISTANT:  Dave Cormier PA-C    ESTIMATED BLOOD LOSS:  100 mL.    COMPONENTS PLACED:  Cemented [employer] size 7 cobalt chrome   cruciate retaining femur, size 7x9 mm all poly tibia, 35 mm oval patella.    FINDINGS:  Severe patellofemoral disease, moderate medial and lateral disease.    COMPLICATIONS:  None.    INDICATIONS FOR SURGERY:  Patient is an 78-year-old gentleman with progressive   worsening pain and limitations secondary to degenerative arthritis of his   left knee.  He has tried to manage this with anti-inflammatory medication,   activity modification, bracing and intra-articular injections.  He is not   having significant pain, is getting worse over time.  His exam shows the knee   was just slight flexion contracture, overall decent motion, and mild effusion,   crepitus and grating in the retropatellar region.  His x-ray standing views   show significant narrowing of medial and lateral joints and bone-on-bone   change in patellofemoral joint with subchondral sclerosis, peripheral   osteophytes.  He is therefore a candidate for left total knee replacement.    SUMMARY:  Patient was brought to the operating room and anesthesia was   administered.  Antibiotics and tranexamic acid were given IV.  Left leg   prepped and draped in usual sterile manner.  Time-out was called.  Leg was   exsanguinated and tourniquet inflated.    We made an anterior incision centered over the medial aspect of the patella.    Standard medial parapatellar arthrotomy was made.  Patella was everted,   measured 27 mm cut down to uniform 17 mm.  A 35 button had good coverage.  We    prepared it and resected the excess bone.  Knee was flexed, tourniquet was   released at this point.  We excised the ACL and menisci, used a 5-degree jig   in the distal femur and just took a little more than the standard amount off   to get a good cut at the base of the trochlear groove.  It measured to a size   7.  We set the rotation appropriately and made all the cuts for size 7   cruciate retaining implant.    Tibia was exposed, the extramedullary jig utilized, lined off the medial third   tubercle, centered the ankle with just slight posterior slope and we took the   standard amount off the less involved anterolateral tibia.  Tibia cut piece   was removed.  Posterior osteophytes removed from the femur, size 7 tibia had   the best coverage with a 9 mm insert.  We had excellent full extension, good   symmetrical stability, posterior cruciate was functioning nicely.  No lift off   in flexion, so we were comfortable at that point, made all appropriate   punches, the real components were cemented into place.  Cement debris was   removed.  The wound was soaked with dilute Betadine for a few minutes and   flushed with saline.  We then placed the knee in flexion, closed the tendon   with a running #2 Quill.  The skin was closed with layers of Vicryl and   running 3-0 Monocryl.  A silver impregnated dressing was placed followed by   compression wrap and a PolarCare unit.  Patient was stable during the   procedure and went to the recovery room in good condition.       ____________________________________     MD FLORY BARTH / PEPE    DD:  01/21/2019 13:35:29  DT:  01/21/2019 13:50:31    D#:  [id number]  Job#:  [id number]

## 2019-01-22 VITALS
WEIGHT: 190.04 LBS | BODY MASS INDEX: 26.6 KG/M2 | HEIGHT: 71 IN | OXYGEN SATURATION: 95 % | RESPIRATION RATE: 17 BRPM | SYSTOLIC BLOOD PRESSURE: 120 MMHG | TEMPERATURE: 98.5 F | DIASTOLIC BLOOD PRESSURE: 72 MMHG | HEART RATE: 101 BPM

## 2019-01-22 LAB
HCT VFR BLD AUTO: 39.8 % (ref 42–52)
HGB BLD-MCNC: 13.3 G/DL (ref 14–18)

## 2019-01-22 PROCEDURE — 96366 THER/PROPH/DIAG IV INF ADDON: CPT

## 2019-01-22 PROCEDURE — 96376 TX/PRO/DX INJ SAME DRUG ADON: CPT

## 2019-01-22 PROCEDURE — G0378 HOSPITAL OBSERVATION PER HR: HCPCS

## 2019-01-22 PROCEDURE — 97165 OT EVAL LOW COMPLEX 30 MIN: CPT

## 2019-01-22 PROCEDURE — 85018 HEMOGLOBIN: CPT

## 2019-01-22 PROCEDURE — 700111 HCHG RX REV CODE 636 W/ 250 OVERRIDE (IP): Performed by: PHYSICIAN ASSISTANT

## 2019-01-22 PROCEDURE — 36415 COLL VENOUS BLD VENIPUNCTURE: CPT

## 2019-01-22 PROCEDURE — 51798 US URINE CAPACITY MEASURE: CPT

## 2019-01-22 PROCEDURE — 97161 PT EVAL LOW COMPLEX 20 MIN: CPT

## 2019-01-22 PROCEDURE — A9270 NON-COVERED ITEM OR SERVICE: HCPCS | Performed by: PHYSICIAN ASSISTANT

## 2019-01-22 PROCEDURE — 700102 HCHG RX REV CODE 250 W/ 637 OVERRIDE(OP): Performed by: PHYSICIAN ASSISTANT

## 2019-01-22 PROCEDURE — 700112 HCHG RX REV CODE 229: Performed by: PHYSICIAN ASSISTANT

## 2019-01-22 PROCEDURE — 96375 TX/PRO/DX INJ NEW DRUG ADDON: CPT

## 2019-01-22 PROCEDURE — 85014 HEMATOCRIT: CPT

## 2019-01-22 RX ORDER — HYDROCODONE BITARTRATE AND ACETAMINOPHEN 10; 325 MG/1; MG/1
.5-1 TABLET ORAL EVERY 4 HOURS PRN
Qty: 60 TAB | Refills: 0 | Status: SHIPPED | OUTPATIENT
Start: 2019-01-22 | End: 2019-02-05

## 2019-01-22 RX ADMIN — TAMSULOSIN HYDROCHLORIDE 0.4 MG: 0.4 CAPSULE ORAL at 04:12

## 2019-01-22 RX ADMIN — DOCUSATE SODIUM 100 MG: 100 CAPSULE, LIQUID FILLED ORAL at 04:12

## 2019-01-22 RX ADMIN — CEFAZOLIN SODIUM 2 G: 2 INJECTION, SOLUTION INTRAVENOUS at 04:13

## 2019-01-22 RX ADMIN — KETOROLAC TROMETHAMINE 15 MG: 30 INJECTION, SOLUTION INTRAMUSCULAR; INTRAVENOUS at 04:12

## 2019-01-22 RX ADMIN — DEXAMETHASONE SODIUM PHOSPHATE 6 MG: 4 INJECTION, SOLUTION INTRAMUSCULAR; INTRAVENOUS at 07:45

## 2019-01-22 ASSESSMENT — COGNITIVE AND FUNCTIONAL STATUS - GENERAL
WALKING IN HOSPITAL ROOM: A LITTLE
SUGGESTED CMS G CODE MODIFIER DAILY ACTIVITY: CH
MOBILITY SCORE: 22
DAILY ACTIVITIY SCORE: 24
SUGGESTED CMS G CODE MODIFIER MOBILITY: CJ
CLIMB 3 TO 5 STEPS WITH RAILING: A LITTLE

## 2019-01-22 ASSESSMENT — GAIT ASSESSMENTS
ASSISTIVE DEVICE: FRONT WHEEL WALKER
DISTANCE (FEET): 500
GAIT LEVEL OF ASSIST: SUPERVISED
DEVIATION: OTHER (COMMENT)

## 2019-01-22 ASSESSMENT — ACTIVITIES OF DAILY LIVING (ADL): TOILETING: INDEPENDENT

## 2019-01-22 ASSESSMENT — PAIN SCALES - GENERAL: PAINLEVEL_OUTOF10: 1

## 2019-01-22 NOTE — CARE PLAN
Problem: Elimination  Goal: Regular urinary elimination    Intervention: Monitor ability to void  Patient voided in bathroom. Encouraging intake of fluids. IVF in use.      Problem: Risk for Deep Vein Thrombosis/Venous Thromboembolism  Goal: DVT/VTE Prevention Measures in Place    Intervention: Intermittent sequential compression device/foot pumps/SULEIMAN hose  SCDs in place. Encouraging increased mobility; patient tolerating ambulation well.

## 2019-01-22 NOTE — CARE PLAN
Problem: Risk for Impaired Mobility  Goal: Mobilization  Outcome: PROGRESSING AS EXPECTED    Pt up amb  Greater than 50 ft day of surgery. Steady gait with walker. PT/OT evaluation orders in place. Up to chair for meals    Problem: Oxygenation/Respiratory Function  Goal: Patient will Achieve/Maintain Optimal Respiratory Function  Outcome: PROGRESSING AS EXPECTED  Pt maintains O2 saturation greater than 50ft on room air. Demonstrates effective use of incentive spirometer    Problem: Pain  Goal: Alleviation of Pain or a reduction in pain to the patient's comfort goal  Outcome: PROGRESSING AS EXPECTED  Pain controlled with scheduled  Medication and polar ice machine

## 2019-01-22 NOTE — PROGRESS NOTES
"S: Feels well, minimal pain    O: Up walking, great ROM, able to void    Blood pressure 103/67, pulse 85, temperature 36.3 °C (97.3 °F), temperature source Temporal, resp. rate 17, height 1.803 m (5' 11\"), weight 86.2 kg (190 lb 0.6 oz), SpO2 95 %.    Recent Labs      01/22/19   0444   HEMOGLOBIN  13.3*   HEMATOCRIT  39.8*         Intake/Output Summary (Last 24 hours) at 01/22/19 0643  Last data filed at 01/21/19 1652   Gross per 24 hour   Intake             2460 ml   Output              150 ml   Net             2310 ml         A:  Patient Active Problem List    Diagnosis Date Noted   • Near syncope 12/28/2017   • AF (atrial fibrillation) (HCC) 12/28/2017   • Anxiety 12/28/2017   • Dizziness 12/28/2017   • Subtherapeutic international normalized ratio (INR) 12/28/2017   • Hypertension 12/28/2017   • Palpitations 01/25/2012   • Mitral valve disorder 01/25/2012   • Anxiety disorder 11/10/2011   • Other chest pain 11/10/2011       Doing well after TKA    PLAN: Home later today as long as emptying bladder adequately  "

## 2019-01-22 NOTE — PROGRESS NOTES
Patient discharged home per active order. Prescription given to patient, signed consent in chart. Discharge instructions reviewed with patient, signed copy in chart. Patient verbalized understanding of orders and all questions addressed. PIV removed, catheter intact. Traction delivered front-wheel walker for home. Patient escorted off the unit via wheelchair with all personal belongings accompanied by staff and wife.

## 2019-01-22 NOTE — THERAPY
"Occupational Therapy Evaluation completed.   Functional Status:    Pleasant 77 y/o male s/p L TKA. Pt doing well today, seated in chair when received by OT. He completes simulated LB dressing with supv, sit><stand with supv, ambulated to sink using FWW, and completed standing G/H with supv. Pt is steady and will have a good support system in place at home. His new FWW was delivered during session - FWW adjusted to correct height for pt and posterior rubber feet were replaced. Pt with no concerns and no further acute OT needs at this time.     Plan of Care: Patient with no further skilled OT needs in the acute care setting at this time  Discharge Recommendations:  Equipment: No Equipment Needed. Post-acute therapy Currently anticipate no further skilled therapy needs once patient is discharged from the inpatient setting.    See \"Rehab Therapy-Acute\" Patient Summary Report for complete documentation.    "

## 2019-01-22 NOTE — DISCHARGE SUMMARY
Jett Nava was admitted on 1/21/2019 for UNILATERAL PRIMARY OSTEOARTHRITIS LEFT KNEE  Degenerative arthritis of left knee  Degenerative arthritis of left knee  Patient was diagnosed with severe degenerative arthritis in the left knee and underwent a left total knee arthroplasty by Dr. Dennis Oliveira on the date of admission.    Hospital course:     The patient has done well, with no complications.  Patient denies chest pain, calf pain or shortness of breath.   Pain is well-controlled at present.  Patient is ambulating well with the use of an assistive device, and progressing in physical therapy.   Patient is neurologically and vascularly intact with palpable pedal pulses bilaterally.      Discharge date: 1/22/19    Patient is being discharged to home.     Allergies:  Nsaids       Medication List      START taking these medications      Instructions   HYDROcodone/acetaminophen  MG Tabs  Commonly known as:  NORCO   Take 0.5-1 Tabs by mouth every four hours as needed for Severe Pain for up to 14 days.  Dose:  0.5-1 Tab        CONTINUE taking these medications      Instructions   acetaminophen 500 MG Tabs  Commonly known as:  TYLENOL   Take 1,000 mg by mouth every 6 hours as needed.  Dose:  1000 mg     ALIGN 4 MG Caps   Take 1 Cap by mouth every day.  Dose:  1 Cap     ANDROGEL 25 MG/2.5GM (1%) Gel  Generic drug:  Testosterone   Apply 2.5 g to skin as directed every day.  Dose:  2.5 g     aspirin EC 81 MG Tbec  Commonly known as:  ECOTRIN   Take 1 Tab by mouth every day.  Dose:  81 mg     atorvastatin 10 MG Tabs  Commonly known as:  LIPITOR   Take 10 mg by mouth every evening.  Dose:  10 mg     Biotin 2500 MCG Caps   Take 1 Cap by mouth every day.  Dose:  1 Cap     dorzolamide-timolol 22.3-6.8 MG/ML Soln  Commonly known as:  COSOPT   Place 1 Drop in both eyes every day.  Dose:  1 Drop     escitalopram 5 MG tablet  Commonly known as:  LEXAPRO   Take 5 mg by mouth every day.  Dose:  5 mg     fish oil 1000 MG  Caps capsule   Take 1,000 mg by mouth every day.  Dose:  1000 mg     levothyroxine 25 MCG Tabs  Commonly known as:  SYNTHROID   Take 25 mcg by mouth Every morning on an empty stomach.  Dose:  25 mcg     Magnesium 250 MG Tabs   Take 1 Tab by mouth every evening.  Dose:  1 Tab     Melatonin 5 MG Tabs   Take 1 Tab by mouth every bedtime.  Dose:  1 Tab     METAMUCIL FIBER PO   Take 2 Caps by mouth 4 times a day.  Dose:  2 Cap     NASAL SPRAY NA   Hecker 1 Spray in nose every day. I spray to each nostril daily  Dose:  1 Spray     SIMBRINZA 1-0.2 % Susp  Generic drug:  Brinzolamide-Brimonidine   1 Drop by Ophthalmic route 2 Times a Day.  Dose:  1 Drop     tadalafil 20 MG tablet  Commonly known as:  CIALIS   Take 20 mg by mouth as needed for Erectile Dysfunction.  Dose:  20 mg     Vitamin D3 2000 UNIT Caps   Take 1 Cap by mouth every day.  Dose:  1 Cap            Discharge Instructions:     Patient is instructed to ambulate and weight bear as tolerated with the use of an assistive device, and to continue physical therapy exercises given during this hospital stay.   Patient is to ice and elevate the surgical leg regularly, with pillows under the ankle, nothing is to be placed under the knee.   Patient was given detailed wound care instructions, and will leave the silver dressing on until first post-op visit.   Aspirin twice daily for DVT prophylaxis.  Patient is to follow up with Dr. Oliveira's office in 1-2 weeks.

## 2019-01-22 NOTE — THERAPY
"Physical Therapy Evaluation completed.   Bed Mobility:  Supine to Sit: Supervised  Transfers: Sit to Stand: Supervised  Gait: Level Of Assist: Supervised with Front-Wheel Walker       Plan of Care: Patient with no further skilled PT needs in the acute care setting at this time  Discharge Recommendations: Equipment: Front-Wheel Walker.Recommend outpatient transitional care services for continued physical therapy services.    See \"Rehab Therapy-Acute\" Patient Summary Report for complete documentation.     Pt was seen s/p L TKA with WBAT precautions in place for LLE. Pt was able to demonstrate SPV for all functional mobility with FWW use. No pamela LOB was noted during session and pt was able to improve mechanics to recipricol gait pattern with cues and demonstration. Pt was edcuated on icing, elevation, positioning, and ther-ex progression. Pt was able to demonstrate appropriate mechanics with ther-ex and verbalize correct dosage. Pt will require FWW upon d/c to home. Pt is in no acute skilled PT needs at this time, recommend outpatient therapy for optimal progression.   "

## 2019-01-22 NOTE — PROGRESS NOTES
FWW has been delivered and fitted to pt, For any other Orthopedic assistance please don't hesitate to call the Traction team!

## 2019-01-22 NOTE — CARE PLAN
Problem: Skin Integrity  Goal: Skin Integrity is maintained or improved    Intervention: Measure and document any skin breakdown  Assessed for signs of skin breakdown. Encouraging increased mobility and repositioning to prevent development of pressure ulcers. Surgical dressing and polar ice to left knee; clean, dry, intact.       Problem: Risk for Impaired Mobility  Goal: Mobilization    Intervention: Maintain proper weight bearing status  Patient is weight-bearing as tolerated to left knee; tolerating ambulation throughout the unit well. Patient to be up chair for all meals.       Problem: Pain  Goal: Alleviation of Pain or a reduction in pain to the patient's comfort goal    Intervention: Pain Management-Medications  Provided patient education on pain management using pain scale. Patient states pain is 1/10 and denies intervention. Patient verbalized understanding of education and communicates pain level effectively with RN.

## 2019-01-22 NOTE — PROGRESS NOTES
· 2 RN skin check complete with RIC Sandoval.  · Devices in place: SCDs to bilateral lower extremities, surgical dressing and polar ice sleeve to left knee.  · Skin assessed under devices: no breakdown noted.  · Confirmed pressure ulcers found on: n/a.  · Findings: surgical site to left knee; clean, dry, intact.   · The following interventions in place: encouraging increased mobility, provided extra pillows for support and positioning, moisturizer.

## 2019-04-02 ENCOUNTER — HOSPITAL ENCOUNTER (EMERGENCY)
Facility: MEDICAL CENTER | Age: 79
End: 2019-04-02
Attending: EMERGENCY MEDICINE
Payer: MEDICARE

## 2019-04-02 ENCOUNTER — APPOINTMENT (OUTPATIENT)
Dept: RADIOLOGY | Facility: MEDICAL CENTER | Age: 79
End: 2019-04-02
Attending: EMERGENCY MEDICINE
Payer: MEDICARE

## 2019-04-02 VITALS
TEMPERATURE: 98.4 F | HEIGHT: 71 IN | SYSTOLIC BLOOD PRESSURE: 107 MMHG | RESPIRATION RATE: 18 BRPM | BODY MASS INDEX: 27.56 KG/M2 | DIASTOLIC BLOOD PRESSURE: 68 MMHG | HEART RATE: 111 BPM | WEIGHT: 196.87 LBS | OXYGEN SATURATION: 96 %

## 2019-04-02 DIAGNOSIS — R06.6 INTRACTABLE HICCUPS: ICD-10-CM

## 2019-04-02 DIAGNOSIS — J98.01 BRONCHOSPASM: ICD-10-CM

## 2019-04-02 LAB
ALBUMIN SERPL BCP-MCNC: 3.8 G/DL (ref 3.2–4.9)
ALBUMIN/GLOB SERPL: 1.4 G/DL
ALP SERPL-CCNC: 101 U/L (ref 30–99)
ALT SERPL-CCNC: 11 U/L (ref 2–50)
ANION GAP SERPL CALC-SCNC: 7 MMOL/L (ref 0–11.9)
AST SERPL-CCNC: 17 U/L (ref 12–45)
BASOPHILS # BLD AUTO: 0.7 % (ref 0–1.8)
BASOPHILS # BLD: 0.06 K/UL (ref 0–0.12)
BILIRUB SERPL-MCNC: 0.9 MG/DL (ref 0.1–1.5)
BUN SERPL-MCNC: 12 MG/DL (ref 8–22)
CALCIUM SERPL-MCNC: 8.2 MG/DL (ref 8.4–10.2)
CHLORIDE SERPL-SCNC: 101 MMOL/L (ref 96–112)
CO2 SERPL-SCNC: 27 MMOL/L (ref 20–33)
CREAT SERPL-MCNC: 0.87 MG/DL (ref 0.5–1.4)
EKG IMPRESSION: NORMAL
EOSINOPHIL # BLD AUTO: 0.36 K/UL (ref 0–0.51)
EOSINOPHIL NFR BLD: 4.4 % (ref 0–6.9)
ERYTHROCYTE [DISTWIDTH] IN BLOOD BY AUTOMATED COUNT: 42.9 FL (ref 35.9–50)
GLOBULIN SER CALC-MCNC: 2.7 G/DL (ref 1.9–3.5)
GLUCOSE SERPL-MCNC: 98 MG/DL (ref 65–99)
HCT VFR BLD AUTO: 43.6 % (ref 42–52)
HGB BLD-MCNC: 14.5 G/DL (ref 14–18)
IMM GRANULOCYTES # BLD AUTO: 0.03 K/UL (ref 0–0.11)
IMM GRANULOCYTES NFR BLD AUTO: 0.4 % (ref 0–0.9)
LIPASE SERPL-CCNC: 33 U/L (ref 7–58)
LYMPHOCYTES # BLD AUTO: 0.46 K/UL (ref 1–4.8)
LYMPHOCYTES NFR BLD: 5.6 % (ref 22–41)
MCH RBC QN AUTO: 29 PG (ref 27–33)
MCHC RBC AUTO-ENTMCNC: 33.3 G/DL (ref 33.7–35.3)
MCV RBC AUTO: 87.2 FL (ref 81.4–97.8)
MONOCYTES # BLD AUTO: 0.92 K/UL (ref 0–0.85)
MONOCYTES NFR BLD AUTO: 11.2 % (ref 0–13.4)
NEUTROPHILS # BLD AUTO: 6.38 K/UL (ref 1.82–7.42)
NEUTROPHILS NFR BLD: 77.7 % (ref 44–72)
NRBC # BLD AUTO: 0 K/UL
NRBC BLD-RTO: 0 /100 WBC
PLATELET # BLD AUTO: 173 K/UL (ref 164–446)
PMV BLD AUTO: 9.6 FL (ref 9–12.9)
POTASSIUM SERPL-SCNC: 3.8 MMOL/L (ref 3.6–5.5)
PROT SERPL-MCNC: 6.5 G/DL (ref 6–8.2)
RBC # BLD AUTO: 5 M/UL (ref 4.7–6.1)
SODIUM SERPL-SCNC: 135 MMOL/L (ref 135–145)
TROPONIN I SERPL-MCNC: <0.02 NG/ML (ref 0–0.04)
WBC # BLD AUTO: 8.2 K/UL (ref 4.8–10.8)

## 2019-04-02 PROCEDURE — 71045 X-RAY EXAM CHEST 1 VIEW: CPT

## 2019-04-02 PROCEDURE — 94640 AIRWAY INHALATION TREATMENT: CPT

## 2019-04-02 PROCEDURE — 96375 TX/PRO/DX INJ NEW DRUG ADDON: CPT

## 2019-04-02 PROCEDURE — 96365 THER/PROPH/DIAG IV INF INIT: CPT

## 2019-04-02 PROCEDURE — 94760 N-INVAS EAR/PLS OXIMETRY 1: CPT

## 2019-04-02 PROCEDURE — 70360 X-RAY EXAM OF NECK: CPT

## 2019-04-02 PROCEDURE — 36415 COLL VENOUS BLD VENIPUNCTURE: CPT

## 2019-04-02 PROCEDURE — 83690 ASSAY OF LIPASE: CPT

## 2019-04-02 PROCEDURE — 80053 COMPREHEN METABOLIC PANEL: CPT

## 2019-04-02 PROCEDURE — 700101 HCHG RX REV CODE 250: Performed by: EMERGENCY MEDICINE

## 2019-04-02 PROCEDURE — 85025 COMPLETE CBC W/AUTO DIFF WBC: CPT

## 2019-04-02 PROCEDURE — 99285 EMERGENCY DEPT VISIT HI MDM: CPT

## 2019-04-02 PROCEDURE — 700111 HCHG RX REV CODE 636 W/ 250 OVERRIDE (IP)

## 2019-04-02 PROCEDURE — 84484 ASSAY OF TROPONIN QUANT: CPT

## 2019-04-02 PROCEDURE — 93005 ELECTROCARDIOGRAM TRACING: CPT | Performed by: EMERGENCY MEDICINE

## 2019-04-02 RX ORDER — BENZONATATE 100 MG/1
100 CAPSULE ORAL 3 TIMES DAILY PRN
Qty: 60 CAP | Refills: 0 | Status: SHIPPED | OUTPATIENT
Start: 2019-04-02 | End: 2020-06-03

## 2019-04-02 RX ORDER — CHLORPROMAZINE HYDROCHLORIDE 25 MG/ML
INJECTION INTRAMUSCULAR
Status: COMPLETED
Start: 2019-04-02 | End: 2019-04-02

## 2019-04-02 RX ORDER — ALBUTEROL SULFATE 90 UG/1
2 AEROSOL, METERED RESPIRATORY (INHALATION)
Qty: 1 INHALER | Refills: 2 | Status: SHIPPED | OUTPATIENT
Start: 2019-04-02 | End: 2020-06-03

## 2019-04-02 RX ORDER — DIPHENHYDRAMINE HYDROCHLORIDE 50 MG/ML
12.5 INJECTION INTRAMUSCULAR; INTRAVENOUS ONCE
Status: COMPLETED | OUTPATIENT
Start: 2019-04-02 | End: 2019-04-02

## 2019-04-02 RX ORDER — CHLORPROMAZINE HYDROCHLORIDE 25 MG/1
25 TABLET, FILM COATED ORAL 3 TIMES DAILY PRN
Qty: 10 TAB | Refills: 0 | Status: SHIPPED | OUTPATIENT
Start: 2019-04-02 | End: 2020-06-03

## 2019-04-02 RX ORDER — IPRATROPIUM BROMIDE AND ALBUTEROL SULFATE 2.5; .5 MG/3ML; MG/3ML
3 SOLUTION RESPIRATORY (INHALATION)
Status: DISCONTINUED | OUTPATIENT
Start: 2019-04-02 | End: 2019-04-02 | Stop reason: HOSPADM

## 2019-04-02 RX ORDER — DIPHENHYDRAMINE HYDROCHLORIDE 50 MG/ML
INJECTION INTRAMUSCULAR; INTRAVENOUS
Status: COMPLETED
Start: 2019-04-02 | End: 2019-04-02

## 2019-04-02 RX ADMIN — DIPHENHYDRAMINE HYDROCHLORIDE 12.5 MG: 50 INJECTION INTRAMUSCULAR; INTRAVENOUS at 21:17

## 2019-04-02 RX ADMIN — CHLORPROMAZINE HYDROCHLORIDE 25 MG: 25 INJECTION INTRAMUSCULAR at 20:22

## 2019-04-02 RX ADMIN — IPRATROPIUM BROMIDE AND ALBUTEROL SULFATE 3 ML: .5; 3 SOLUTION RESPIRATORY (INHALATION) at 19:46

## 2019-04-03 NOTE — ED NOTES
Discharge information provided. Pt verbalized understanding of discharge instructions to follow up with PCP and to return to ER if condition worsens. Pt expressed the awareness of not driving or operating heavy machinery, has ride home with Wife. Pt wheeled out of ER, no additional questions or concerns. Educated on new medication

## 2019-04-03 NOTE — ED NOTES
Pt reports feeling restless, -115 after Thorazine admin. ERP notified and at BS  Medicated with IV Benadryl as ordered

## 2019-04-03 NOTE — ED NOTES
Pt wants to go home, states he is feeling better. BP checked, able to ambulate and get dressed. ERP aware of VS on d/c.

## 2019-04-03 NOTE — ED TRIAGE NOTES
"Chief Complaint   Patient presents with   • Oral Swelling     Pt c/o \"throat feeling tight\" started Sunday   • Hiccups     since March 24   • Neck Pain     reports neck has been hurting since March 24     /93   Pulse 88   Temp 37.1 °C (98.7 °F) (Temporal)   Resp 20   Ht 1.803 m (5' 11\")   Wt 89.3 kg (196 lb 13.9 oz)   SpO2 100%   BMI 27.46 kg/m²     "

## 2019-04-03 NOTE — ED NOTES
"Pt assessed, c/o hiccups and \"pain in esophagus after eating, feels like the food is stuck\" since 3/25. Pt reports he has intermittent episodes where he feels like he is choking and can't breathe. Last episode in Triage. Had same issue in January after surgery and was given medication which helped, pt has tried this medication this time and states it has not helped. Will cont to monitor. No oral swelling noted. No s/s resp distress. Will cont to monitor   "

## 2019-04-03 NOTE — ED NOTES
Pt dizzy when standing up to d/c. Sat back down in bed, VS rechecked. ERP aware, told to give patient time for medication to wear off and once he feels better he can go home

## 2019-04-03 NOTE — ED PROVIDER NOTES
"ED Provider Note    CHIEF COMPLAINT  Chief Complaint   Patient presents with   • Oral Swelling     Pt c/o \"throat feeling tight\" started Sunday   • Hiccups     since March 24   • Neck Pain     reports neck has been hurting since March 24       HPI  Jett Nava is a 78 y.o. male who presents with multiple complaints.  He had knee replacement surgery in January was doing well until about 24 March he started having hiccups.  He had hiccups in January after the surgery was given medication resolved and recurred on the 24th he also at that time began feeling like he was getting cold symptoms.  Since then in the last 2-3 days it worsened he gets choking episodes he has hiccups.  He has what sounds like a stridor sound that he develops that he can clear.  He gets chest tightness with his choking he has a cough is nonproductive no new neck pain no fever no chills some nausea no vomiting no diarrhea no arm neck or back pain is new some shortness of breath all other systems are negative    REVIEW OF SYSTEMS  See HPI for further details    PAST MEDICAL HISTORY  Past Medical History:   Diagnosis Date   • Arthritis     osteo   • Cataract    • Coronary artery disease    • Glaucoma    • Headache(784.0)    • Heart attack (HCC)    • Heart valve disease    • Migraine    • Muscle disorder    • Psychiatric problem     anxiety   • Stroke (HCC) 02/17/2017    no weakness/problems post stroke       FAMILY HISTORY  Family History   Problem Relation Age of Onset   • Arthritis Mother    • Genetic Mother    • Cancer Father    • Alcohol/Drug Father    • Arthritis Brother    • Genetic Brother    • Alcohol/Drug Brother    • Alcohol/Drug Maternal Aunt    • Stroke Maternal Uncle    • Alcohol/Drug Maternal Uncle    • Genetic Paternal Uncle    • Genetic Maternal Grandmother    • Stroke Maternal Grandfather    • Genetic Paternal Grandmother    • Psychiatry Paternal Grandmother        SOCIAL HISTORY  Social History     Social History   • " "Marital status:      Spouse name: N/A   • Number of children: N/A   • Years of education: N/A     Social History Main Topics   • Smoking status: Former Smoker     Packs/day: 1.00     Types: Cigarettes     Quit date: 1/1/1970   • Smokeless tobacco: Never Used   • Alcohol use 0.5 oz/week     1 Glasses of wine per week   • Drug use: No   • Sexual activity: Not on file     Other Topics Concern   • Not on file     Social History Narrative   • No narrative on file       SURGICAL HISTORY  Past Surgical History:   Procedure Laterality Date   • KNEE ARTHROPLASTY TOTAL Left 1/21/2019    Procedure: KNEE ARTHROPLASTY TOTAL;  Surgeon: Dennis Oliveira M.D.;  Location: SURGERY [facility];  Service: Orthopedics   • CATARACT PHACO WITH IOL     • KNEE ARTHROPLASTY TOTAL     • OPEN REDUCTION     • TIBIA ORIF     • TURP-VAPOR         CURRENT MEDICATIONS  Home Medications    **Home medications have not yet been reviewed for this encounter**         ALLERGIES  Allergies   Allergen Reactions   • Nsaids      stomach ache       PHYSICAL EXAM  VITAL SIGNS: /76   Pulse (!) 103   Temp 37.1 °C (98.7 °F) (Temporal)   Resp 18   Ht 1.803 m (5' 11\")   Wt 89.3 kg (196 lb 13.9 oz)   SpO2 98%   BMI 27.46 kg/m²     Constitutional: Patient is alert and oriented x3 in no distress   HENT: Moist mucous membranes  Eyes:   No conjunctivitis  Neck: Trach is midline no palpable thyroid  Lymphatic: Negative anterior cervical lymphadenopathy  Cardiovascular: Normal heart rate    Thorax & Lungs: Coarse wheezing throughout all lung fields  Back: No CVA tenderness  Abdomen: Nontender  Neurologic: Normal motor sensation  Extremities: No edema  Psychiatric: Affect normal, Judgment normal, Mood normal.       Results for orders placed or performed during the hospital encounter of 04/02/19   CBC WITH DIFFERENTIAL   Result Value Ref Range    WBC 8.2 4.8 - 10.8 K/uL    RBC 5.00 4.70 - 6.10 M/uL    Hemoglobin 14.5 14.0 - 18.0 g/dL    Hematocrit 43.6 " 42.0 - 52.0 %    MCV 87.2 81.4 - 97.8 fL    MCH 29.0 27.0 - 33.0 pg    MCHC 33.3 (L) 33.7 - 35.3 g/dL    RDW 42.9 35.9 - 50.0 fL    Platelet Count 173 164 - 446 K/uL    MPV 9.6 9.0 - 12.9 fL    Neutrophils-Polys 77.70 (H) 44.00 - 72.00 %    Lymphocytes 5.60 (L) 22.00 - 41.00 %    Monocytes 11.20 0.00 - 13.40 %    Eosinophils 4.40 0.00 - 6.90 %    Basophils 0.70 0.00 - 1.80 %    Immature Granulocytes 0.40 0.00 - 0.90 %    Nucleated RBC 0.00 /100 WBC    Neutrophils (Absolute) 6.38 1.82 - 7.42 K/uL    Lymphs (Absolute) 0.46 (L) 1.00 - 4.80 K/uL    Monos (Absolute) 0.92 (H) 0.00 - 0.85 K/uL    Eos (Absolute) 0.36 0.00 - 0.51 K/uL    Baso (Absolute) 0.06 0.00 - 0.12 K/uL    Immature Granulocytes (abs) 0.03 0.00 - 0.11 K/uL    NRBC (Absolute) 0.00 K/uL   COMP METABOLIC PANEL   Result Value Ref Range    Sodium 135 135 - 145 mmol/L    Potassium 3.8 3.6 - 5.5 mmol/L    Chloride 101 96 - 112 mmol/L    Co2 27 20 - 33 mmol/L    Anion Gap 7.0 0.0 - 11.9    Glucose 98 65 - 99 mg/dL    Bun 12 8 - 22 mg/dL    Creatinine 0.87 0.50 - 1.40 mg/dL    Calcium 8.2 (L) 8.4 - 10.2 mg/dL    AST(SGOT) 17 12 - 45 U/L    ALT(SGPT) 11 2 - 50 U/L    Alkaline Phosphatase 101 (H) 30 - 99 U/L    Total Bilirubin 0.9 0.1 - 1.5 mg/dL    Albumin 3.8 3.2 - 4.9 g/dL    Total Protein 6.5 6.0 - 8.2 g/dL    Globulin 2.7 1.9 - 3.5 g/dL    A-G Ratio 1.4 g/dL   LIPASE   Result Value Ref Range    Lipase 33 7 - 58 U/L   TROPONIN   Result Value Ref Range    Troponin I <0.02 0.00 - 0.04 ng/mL   ESTIMATED GFR   Result Value Ref Range    GFR If African American >60 >60 mL/min/1.73 m 2    GFR If Non African American >60 >60 mL/min/1.73 m 2   EKG (NOW)   Result Value Ref Range    Report       [facility] Emergency Dept.    Test Date:  2019  Pt Name:    YAYO SHIRLEY                 Department: EDS  MRN:        [id number]                      [facility]       [facility]  Gender:     Male                         Technician: loraine  :         [id number]                   Requested By:MOHSEN MARTINO  Order #:    [id number]                    Reading MD: MOHSEN MARTINO MD    Measurements  Intervals                                Axis  Rate:       77                           P:          -15  CO:         200                          QRS:        6  QRSD:       94                           T:          17  QT:         356  QTc:        403    Interpretive Statements  Normal sinus rhythm rate 77 with a normal corrected QT interval QRS axis  normal  EKG    Electronically Signed On 4-2-2019 20:00:02 PDT by MOHSEN MARTINO MD        DX-NECK FOR SOFT TISSUE   Final Result      1.  No evidence of retropharyngeal soft tissue swelling or radiopaque foreign body.      2.  Degenerative change of the cervical spine.      DX-CHEST-PORTABLE (1 VIEW)   Final Result      No acute cardiopulmonary findings.            COURSE & MEDICAL DECISION MAKING  Pertinent Labs & Imaging studies reviewed. (See chart for details)  The patient's neck x-ray does not show any mass or soft tissue swelling.  Chest x-ray shows no pneumonia.  He has normal white count complete metabolic panel is troponins normal.    Patient was given a DuoNeb treatment with complete resolution of his wheezing at this time.    Patient was requesting something for the hiccups he was given Thorazine IV which did have significant but not complete improvement.  He did get some feeling of needing to move consistent with some acus that she has been given Benadryl.  I assured him that this will resolve.    Discharge the patient with 10-25 mg Thorazine as needed usage for hiccups and albuterol inhaler for his bronchospasm and Tessalon for his cough.  He has been given return precautions and follow-up    FINAL IMPRESSION  1.   1. Intractable hiccups    2. Bronchospasm        2.   3.         Electronically signed by: Mohsen Martino, 4/2/2019 7:18 PM

## 2019-04-03 NOTE — FLOWSHEET NOTE
04/02/19 1947   Events/Summary/Plan   Events/Summary/Plan SVN    Interdisciplinary Plan of Care-Goals (Indications)   Bronchodilator Indications History / Diagnosis   Interdisciplinary Plan of Care-Outcomes    Bronchodilator Outcome Patient at Stable Baseline   Education   Education Yes - Pt. / Family has been Instructed in use of Respiratory Equipment;Yes - Pt. / Family has been Instructed in use of Respiratory Medications and Adverse Reactions   RT Assessment of Delivered Medications   Evaluation of Medication Delivery Daily Yes-- Pt /Family has been Instructed in use of Respiratory Medications and Adverse Reactions   SVN Group   #SVN Performed Yes   Given By: Mouthpiece   Date SVN Last Changed 04/02/19   Date SVN Next Change Due (Q 7 Days) 04/09/19   Respiratory WDL   Respiratory (WDL) X   Chest Exam   Respiration 18   Pulse 77   Breath Sounds   RUL Breath Sounds Clear   RML Breath Sounds Clear   RLL Breath Sounds Clear   DOMINICK Breath Sounds Clear   LLL Breath Sounds Clear   Oximetry   #Pulse Oximetry (Single Determination) Yes   Oxygen   Home O2 Use Prior To Admission? No   Pulse Oximetry 97 %   O2 Daily Delivery Respiratory  Room Air with O2 Available   Room Air Challenge Pass

## 2019-04-22 ENCOUNTER — HOSPITAL ENCOUNTER (OUTPATIENT)
Dept: HOSPITAL 8 - CFH | Age: 79
Discharge: HOME | End: 2019-04-22
Attending: FAMILY MEDICINE
Payer: MEDICARE

## 2019-04-22 DIAGNOSIS — R06.6: Primary | ICD-10-CM

## 2019-04-22 PROCEDURE — 74160 CT ABDOMEN W/CONTRAST: CPT

## 2019-04-22 PROCEDURE — 71260 CT THORAX DX C+: CPT

## 2020-04-23 ENCOUNTER — HOSPITAL ENCOUNTER (OUTPATIENT)
Dept: HOSPITAL 8 - CFH | Age: 80
Discharge: HOME | End: 2020-04-23
Attending: INTERNAL MEDICINE
Payer: MEDICARE

## 2020-04-23 DIAGNOSIS — I08.8: Primary | ICD-10-CM

## 2020-04-23 DIAGNOSIS — I48.0: ICD-10-CM

## 2020-04-23 PROCEDURE — 93306 TTE W/DOPPLER COMPLETE: CPT

## 2020-06-01 ENCOUNTER — OFFICE VISIT (OUTPATIENT)
Dept: ADMISSIONS | Facility: MEDICAL CENTER | Age: 80
End: 2020-06-01
Attending: SPECIALIST
Payer: MEDICARE

## 2020-06-01 DIAGNOSIS — Z01.812 PRE-OPERATIVE LABORATORY EXAMINATION: ICD-10-CM

## 2020-06-01 LAB — COVID ORDER STATUS COVID19: NORMAL

## 2020-06-01 PROCEDURE — C9803 HOPD COVID-19 SPEC COLLECT: HCPCS

## 2020-06-03 DIAGNOSIS — Z01.810 PRE-OPERATIVE CARDIOVASCULAR EXAMINATION: ICD-10-CM

## 2020-06-03 DIAGNOSIS — Z01.812 PRE-OPERATIVE LABORATORY EXAMINATION: ICD-10-CM

## 2020-06-03 LAB
ANION GAP SERPL CALC-SCNC: 9 MMOL/L (ref 7–16)
BUN SERPL-MCNC: 13 MG/DL (ref 8–22)
CALCIUM SERPL-MCNC: 8.6 MG/DL (ref 8.5–10.5)
CHLORIDE SERPL-SCNC: 102 MMOL/L (ref 96–112)
CO2 SERPL-SCNC: 27 MMOL/L (ref 20–33)
CREAT SERPL-MCNC: 0.94 MG/DL (ref 0.5–1.4)
EKG IMPRESSION: NORMAL
GLUCOSE SERPL-MCNC: 94 MG/DL (ref 65–99)
POTASSIUM SERPL-SCNC: 4.6 MMOL/L (ref 3.6–5.5)
SODIUM SERPL-SCNC: 138 MMOL/L (ref 135–145)

## 2020-06-03 PROCEDURE — 93005 ELECTROCARDIOGRAM TRACING: CPT

## 2020-06-03 PROCEDURE — 36415 COLL VENOUS BLD VENIPUNCTURE: CPT

## 2020-06-03 PROCEDURE — 80048 BASIC METABOLIC PNL TOTAL CA: CPT

## 2020-06-03 PROCEDURE — 93010 ELECTROCARDIOGRAM REPORT: CPT | Performed by: INTERNAL MEDICINE

## 2020-06-03 RX ORDER — SIMETHICONE 80 MG
80 TABLET,CHEWABLE ORAL PRN
COMMUNITY
End: 2020-11-02

## 2020-06-03 RX ORDER — TAMSULOSIN HYDROCHLORIDE 0.4 MG/1
0.4 CAPSULE ORAL SEE ADMIN INSTRUCTIONS
COMMUNITY
End: 2020-12-23

## 2020-06-03 RX ORDER — FINASTERIDE 5 MG/1
5 TABLET, FILM COATED ORAL DAILY
COMMUNITY

## 2020-06-03 ASSESSMENT — FIBROSIS 4 INDEX: FIB4 SCORE: 2.4

## 2020-06-03 NOTE — OR NURSING
COVID-19 Pre-surgery screenin.Do you have an undiagnosed respiratory illness or symptoms such as coughing or sneezing?   Do you have an unexplained fever greater than 100.4 degrees Fahrenheit or 38 degrees Celsius?     NO    3Have you had direct exposure to a patient who tested positive for Covid-19?    NO  4. Have you traveled outside [facility] in the last 14 days? NO    5. Have you had any lost of taste, smell, N/V or sore throat?                                        NO  Patient has been informed of no visitor policy and asked to wear a mask upon entering the hospital   YES

## 2020-06-04 ENCOUNTER — HOSPITAL ENCOUNTER (OUTPATIENT)
Facility: MEDICAL CENTER | Age: 80
End: 2020-06-04
Attending: SPECIALIST | Admitting: SPECIALIST
Payer: MEDICARE

## 2020-06-04 ENCOUNTER — ANESTHESIA EVENT (OUTPATIENT)
Dept: SURGERY | Facility: MEDICAL CENTER | Age: 80
End: 2020-06-04
Payer: MEDICARE

## 2020-06-04 ENCOUNTER — ANESTHESIA (OUTPATIENT)
Dept: SURGERY | Facility: MEDICAL CENTER | Age: 80
End: 2020-06-04
Payer: MEDICARE

## 2020-06-04 VITALS
HEIGHT: 71 IN | HEART RATE: 81 BPM | OXYGEN SATURATION: 95 % | DIASTOLIC BLOOD PRESSURE: 89 MMHG | TEMPERATURE: 97.7 F | BODY MASS INDEX: 27.25 KG/M2 | SYSTOLIC BLOOD PRESSURE: 151 MMHG | WEIGHT: 194.67 LBS | RESPIRATION RATE: 14 BRPM

## 2020-06-04 DIAGNOSIS — J34.3 HYPERTROPHY OF BOTH INFERIOR NASAL TURBINATES: ICD-10-CM

## 2020-06-04 DIAGNOSIS — J34.2 NASAL SEPTAL DEVIATION: ICD-10-CM

## 2020-06-04 DIAGNOSIS — G89.18 ACUTE POSTOPERATIVE PAIN: ICD-10-CM

## 2020-06-04 DIAGNOSIS — M95.0 NASAL VALVE COLLAPSE: ICD-10-CM

## 2020-06-04 PROBLEM — J34.829 NASAL VALVE COLLAPSE: Status: ACTIVE | Noted: 2020-06-04

## 2020-06-04 LAB
COVID ORDER STATUS COVID19: NORMAL
SARS-COV-2 RNA RESP QL NAA+PROBE: NOT DETECTED
SPECIMEN SOURCE: NORMAL

## 2020-06-04 PROCEDURE — 160009 HCHG ANES TIME/MIN: Performed by: SPECIALIST

## 2020-06-04 PROCEDURE — 160048 HCHG OR STATISTICAL LEVEL 1-5: Performed by: SPECIALIST

## 2020-06-04 PROCEDURE — 160036 HCHG PACU - EA ADDL 30 MINS PHASE I: Performed by: SPECIALIST

## 2020-06-04 PROCEDURE — 110454 HCHG SHELL REV 250: Performed by: SPECIALIST

## 2020-06-04 PROCEDURE — 160041 HCHG SURGERY MINUTES - EA ADDL 1 MIN LEVEL 4: Performed by: SPECIALIST

## 2020-06-04 PROCEDURE — 160025 RECOVERY II MINUTES (STATS): Performed by: SPECIALIST

## 2020-06-04 PROCEDURE — C9803 HOPD COVID-19 SPEC COLLECT: HCPCS | Performed by: SPECIALIST

## 2020-06-04 PROCEDURE — A9270 NON-COVERED ITEM OR SERVICE: HCPCS | Performed by: ANESTHESIOLOGY

## 2020-06-04 PROCEDURE — 160029 HCHG SURGERY MINUTES - 1ST 30 MINS LEVEL 4: Performed by: SPECIALIST

## 2020-06-04 PROCEDURE — 502000 HCHG MISC OR IMPLANTS RC 0278: Performed by: SPECIALIST

## 2020-06-04 PROCEDURE — 700111 HCHG RX REV CODE 636 W/ 250 OVERRIDE (IP): Performed by: ANESTHESIOLOGY

## 2020-06-04 PROCEDURE — 700102 HCHG RX REV CODE 250 W/ 637 OVERRIDE(OP): Performed by: ANESTHESIOLOGY

## 2020-06-04 PROCEDURE — 160046 HCHG PACU - 1ST 60 MINS PHASE II: Performed by: SPECIALIST

## 2020-06-04 PROCEDURE — 501409 HCHG SPLINT, REUTER BI-VALVE NASAL: Performed by: SPECIALIST

## 2020-06-04 PROCEDURE — 502573 HCHG PACK, ENT: Performed by: SPECIALIST

## 2020-06-04 PROCEDURE — 502692 HCHG DRESSING, NASOPORE 8CM: Performed by: SPECIALIST

## 2020-06-04 PROCEDURE — 700101 HCHG RX REV CODE 250: Performed by: ANESTHESIOLOGY

## 2020-06-04 PROCEDURE — 700101 HCHG RX REV CODE 250: Mod: TB | Performed by: SPECIALIST

## 2020-06-04 PROCEDURE — 160035 HCHG PACU - 1ST 60 MINS PHASE I: Performed by: SPECIALIST

## 2020-06-04 PROCEDURE — 700102 HCHG RX REV CODE 250 W/ 637 OVERRIDE(OP): Performed by: SPECIALIST

## 2020-06-04 PROCEDURE — A9270 NON-COVERED ITEM OR SERVICE: HCPCS | Performed by: SPECIALIST

## 2020-06-04 PROCEDURE — 500331 HCHG COTTONOID, SURG PATTIE: Performed by: SPECIALIST

## 2020-06-04 PROCEDURE — 501838 HCHG SUTURE GENERAL: Performed by: SPECIALIST

## 2020-06-04 PROCEDURE — C9046 COCAINE HCL NASAL SOLUTION: HCPCS | Mod: TB | Performed by: SPECIALIST

## 2020-06-04 PROCEDURE — 700105 HCHG RX REV CODE 258: Performed by: SPECIALIST

## 2020-06-04 PROCEDURE — 160002 HCHG RECOVERY MINUTES (STAT): Performed by: SPECIALIST

## 2020-06-04 DEVICE — IMPLANTABLE DEVICE: Type: IMPLANTABLE DEVICE | Site: NOSE | Status: FUNCTIONAL

## 2020-06-04 RX ORDER — SODIUM CHLORIDE, SODIUM LACTATE, POTASSIUM CHLORIDE, CALCIUM CHLORIDE 600; 310; 30; 20 MG/100ML; MG/100ML; MG/100ML; MG/100ML
INJECTION, SOLUTION INTRAVENOUS CONTINUOUS
Status: DISCONTINUED | OUTPATIENT
Start: 2020-06-04 | End: 2020-06-04 | Stop reason: HOSPADM

## 2020-06-04 RX ORDER — HALOPERIDOL 5 MG/ML
1 INJECTION INTRAMUSCULAR
Status: DISCONTINUED | OUTPATIENT
Start: 2020-06-04 | End: 2020-06-04 | Stop reason: HOSPADM

## 2020-06-04 RX ORDER — LIDOCAINE HYDROCHLORIDE 20 MG/ML
INJECTION, SOLUTION EPIDURAL; INFILTRATION; INTRACAUDAL; PERINEURAL PRN
Status: DISCONTINUED | OUTPATIENT
Start: 2020-06-04 | End: 2020-06-04 | Stop reason: SURG

## 2020-06-04 RX ORDER — ROCURONIUM BROMIDE 10 MG/ML
INJECTION, SOLUTION INTRAVENOUS PRN
Status: DISCONTINUED | OUTPATIENT
Start: 2020-06-04 | End: 2020-06-04 | Stop reason: SURG

## 2020-06-04 RX ORDER — OXYCODONE HCL 5 MG/5 ML
5 SOLUTION, ORAL ORAL
Status: COMPLETED | OUTPATIENT
Start: 2020-06-04 | End: 2020-06-04

## 2020-06-04 RX ORDER — DIPHENHYDRAMINE HYDROCHLORIDE 50 MG/ML
12.5 INJECTION INTRAMUSCULAR; INTRAVENOUS
Status: DISCONTINUED | OUTPATIENT
Start: 2020-06-04 | End: 2020-06-04 | Stop reason: HOSPADM

## 2020-06-04 RX ORDER — ONDANSETRON 2 MG/ML
4 INJECTION INTRAMUSCULAR; INTRAVENOUS
Status: DISCONTINUED | OUTPATIENT
Start: 2020-06-04 | End: 2020-06-04 | Stop reason: HOSPADM

## 2020-06-04 RX ORDER — CHLORAL HYDRATE 500 MG
1000 CAPSULE ORAL DAILY
Qty: 60 CAP | Refills: 0
Start: 2020-06-11 | End: 2020-12-23

## 2020-06-04 RX ORDER — KETOROLAC TROMETHAMINE 30 MG/ML
INJECTION, SOLUTION INTRAMUSCULAR; INTRAVENOUS PRN
Status: DISCONTINUED | OUTPATIENT
Start: 2020-06-04 | End: 2020-06-04 | Stop reason: SURG

## 2020-06-04 RX ORDER — DEXAMETHASONE SODIUM PHOSPHATE 4 MG/ML
INJECTION, SOLUTION INTRA-ARTICULAR; INTRALESIONAL; INTRAMUSCULAR; INTRAVENOUS; SOFT TISSUE PRN
Status: DISCONTINUED | OUTPATIENT
Start: 2020-06-04 | End: 2020-06-04 | Stop reason: SURG

## 2020-06-04 RX ORDER — ONDANSETRON 2 MG/ML
INJECTION INTRAMUSCULAR; INTRAVENOUS PRN
Status: DISCONTINUED | OUTPATIENT
Start: 2020-06-04 | End: 2020-06-04 | Stop reason: SURG

## 2020-06-04 RX ORDER — HYDRALAZINE HYDROCHLORIDE 20 MG/ML
5 INJECTION INTRAMUSCULAR; INTRAVENOUS
Status: DISCONTINUED | OUTPATIENT
Start: 2020-06-04 | End: 2020-06-04 | Stop reason: HOSPADM

## 2020-06-04 RX ORDER — OXYCODONE HCL 5 MG/5 ML
10 SOLUTION, ORAL ORAL
Status: COMPLETED | OUTPATIENT
Start: 2020-06-04 | End: 2020-06-04

## 2020-06-04 RX ORDER — HYDROCODONE BITARTRATE AND ACETAMINOPHEN 7.5; 325 MG/1; MG/1
1 TABLET ORAL EVERY 6 HOURS PRN
Qty: 30 TAB | Refills: 0 | Status: SHIPPED | OUTPATIENT
Start: 2020-06-04 | End: 2020-06-09

## 2020-06-04 RX ORDER — LIDOCAINE HYDROCHLORIDE AND EPINEPHRINE 10; 10 MG/ML; UG/ML
INJECTION, SOLUTION INFILTRATION; PERINEURAL
Status: DISCONTINUED | OUTPATIENT
Start: 2020-06-04 | End: 2020-06-04 | Stop reason: HOSPADM

## 2020-06-04 RX ORDER — HYDROMORPHONE HYDROCHLORIDE 1 MG/ML
0.2 INJECTION, SOLUTION INTRAMUSCULAR; INTRAVENOUS; SUBCUTANEOUS
Status: DISCONTINUED | OUTPATIENT
Start: 2020-06-04 | End: 2020-06-04 | Stop reason: HOSPADM

## 2020-06-04 RX ORDER — CEFDINIR 300 MG/1
300 CAPSULE ORAL 2 TIMES DAILY
Qty: 10 CAP | Refills: 0 | Status: SHIPPED | OUTPATIENT
Start: 2020-06-04 | End: 2020-06-09

## 2020-06-04 RX ORDER — HYDROMORPHONE HYDROCHLORIDE 1 MG/ML
0.4 INJECTION, SOLUTION INTRAMUSCULAR; INTRAVENOUS; SUBCUTANEOUS
Status: DISCONTINUED | OUTPATIENT
Start: 2020-06-04 | End: 2020-06-04 | Stop reason: HOSPADM

## 2020-06-04 RX ORDER — CEFAZOLIN SODIUM 1 G/3ML
INJECTION, POWDER, FOR SOLUTION INTRAMUSCULAR; INTRAVENOUS PRN
Status: DISCONTINUED | OUTPATIENT
Start: 2020-06-04 | End: 2020-06-04 | Stop reason: SURG

## 2020-06-04 RX ORDER — LIDOCAINE HYDROCHLORIDE AND EPINEPHRINE 10; 10 MG/ML; UG/ML
INJECTION, SOLUTION INFILTRATION; PERINEURAL
Status: DISCONTINUED
Start: 2020-06-04 | End: 2020-06-04 | Stop reason: HOSPADM

## 2020-06-04 RX ORDER — HYDROMORPHONE HYDROCHLORIDE 1 MG/ML
0.1 INJECTION, SOLUTION INTRAMUSCULAR; INTRAVENOUS; SUBCUTANEOUS
Status: DISCONTINUED | OUTPATIENT
Start: 2020-06-04 | End: 2020-06-04 | Stop reason: HOSPADM

## 2020-06-04 RX ORDER — KETAMINE HYDROCHLORIDE 50 MG/ML
INJECTION, SOLUTION INTRAMUSCULAR; INTRAVENOUS PRN
Status: DISCONTINUED | OUTPATIENT
Start: 2020-06-04 | End: 2020-06-04 | Stop reason: SURG

## 2020-06-04 RX ADMIN — LIDOCAINE HYDROCHLORIDE 80 MG: 20 INJECTION, SOLUTION EPIDURAL; INFILTRATION; INTRACAUDAL at 08:43

## 2020-06-04 RX ADMIN — DEXAMETHASONE SODIUM PHOSPHATE 8 MG: 4 INJECTION, SOLUTION INTRA-ARTICULAR; INTRALESIONAL; INTRAMUSCULAR; INTRAVENOUS; SOFT TISSUE at 09:42

## 2020-06-04 RX ADMIN — FENTANYL CITRATE 25 MCG: 50 INJECTION INTRAMUSCULAR; INTRAVENOUS at 10:00

## 2020-06-04 RX ADMIN — PROPOFOL 180 MG: 10 INJECTION, EMULSION INTRAVENOUS at 08:43

## 2020-06-04 RX ADMIN — FENTANYL CITRATE 50 MCG: 50 INJECTION INTRAMUSCULAR; INTRAVENOUS at 09:48

## 2020-06-04 RX ADMIN — ROCURONIUM BROMIDE 100 MG: 10 INJECTION, SOLUTION INTRAVENOUS at 08:43

## 2020-06-04 RX ADMIN — FENTANYL CITRATE 50 MCG: 50 INJECTION INTRAMUSCULAR; INTRAVENOUS at 08:43

## 2020-06-04 RX ADMIN — SODIUM CHLORIDE, POTASSIUM CHLORIDE, SODIUM LACTATE AND CALCIUM CHLORIDE: 600; 310; 30; 20 INJECTION, SOLUTION INTRAVENOUS at 06:50

## 2020-06-04 RX ADMIN — OXYCODONE HYDROCHLORIDE 10 MG: 5 SOLUTION ORAL at 10:49

## 2020-06-04 RX ADMIN — KETAMINE HYDROCHLORIDE 35 MG: 50 INJECTION INTRAMUSCULAR; INTRAVENOUS at 09:05

## 2020-06-04 RX ADMIN — CEFAZOLIN 2 G: 330 INJECTION, POWDER, FOR SOLUTION INTRAMUSCULAR; INTRAVENOUS at 08:50

## 2020-06-04 RX ADMIN — SUGAMMADEX 200 MG: 100 INJECTION, SOLUTION INTRAVENOUS at 09:52

## 2020-06-04 RX ADMIN — KETOROLAC TROMETHAMINE 15 MG: 30 INJECTION, SOLUTION INTRAMUSCULAR at 10:00

## 2020-06-04 RX ADMIN — ONDANSETRON 4 MG: 2 INJECTION INTRAMUSCULAR; INTRAVENOUS at 09:49

## 2020-06-04 RX ADMIN — POVIDONE-IODINE 15 ML: 10 SOLUTION TOPICAL at 06:50

## 2020-06-04 ASSESSMENT — FIBROSIS 4 INDEX: FIB4 SCORE: 2.4

## 2020-06-04 NOTE — ANESTHESIA PREPROCEDURE EVALUATION
Nasal congestion, deviated septum. H/o MVR in 2017. Denies any CP or SOB recently.    Relevant Problems   CARDIAC   (+) AF (atrial fibrillation) (HCC)   (+) Hypertension       Physical Exam    Airway   Mallampati: II  TM distance: >3 FB  Neck ROM: full       Cardiovascular - normal exam  Rhythm: regular  Rate: normal  (-) murmur     Dental - normal exam           Pulmonary - normal exam  Breath sounds clear to auscultation     Abdominal    Neurological - normal exam                 Anesthesia Plan    ASA 3   ASA physical status 3 criteria: MI or angina - history (> 3 months)    Plan - general       Airway plan will be ETT        Induction: intravenous    Postoperative Plan: Postoperative administration of opioids is intended.    Pertinent diagnostic labs and testing reviewed    Informed Consent:    Anesthetic plan and risks discussed with patient.    Use of blood products discussed with: patient whom consented to blood products.

## 2020-06-04 NOTE — OR NURSING
1205: This relief RN assumed care of patient. Patient is resting in chair at this time. No needs at the moment. VSS.    1220: Spouse arrived at bedside.    1235: Previous RN back at bedside. Report to RIC Cadena.

## 2020-06-04 NOTE — ANESTHESIA PROCEDURE NOTES
Airway    Date/Time: 6/4/2020 8:45 AM  Performed by: Titus Gunderson M.D.  Authorized by: Titus Gunderson M.D.     Location:  OR  Urgency:  Elective  Indications for Airway Management:  Anesthesia      Spontaneous Ventilation: absent    Sedation Level:  Deep  Preoxygenated: Yes    Patient Position:  Sniffing  Mask Difficulty Assessment:  0 - not attempted  Final Airway Type:  Endotracheal airway  Final Endotracheal Airway:  ETT  Cuffed: Yes    Technique Used for Successful ETT Placement:  Direct laryngoscopy    Insertion Site:  Oral  Blade Type:  Tal  Laryngoscope Blade/Videolaryngoscope Blade Size:  2  ETT Size (mm):  7.5  Measured from:  Lips  ETT to Lips (cm):  22  Placement Verified by: auscultation and capnometry    Cormack-Lehane Classification:  Grade IIa - partial view of glottis  Number of Attempts at Approach:  1  Number of Other Approaches Attempted:  0

## 2020-06-04 NOTE — DISCHARGE INSTRUCTIONS
ACTIVITY: Rest and take it easy for the first 24 hours.  A responsible adult is recommended to remain with you during that time.  It is normal to feel sleepy.  We encourage you to not do anything that requires balance, judgment or coordination.    MILD FLU-LIKE SYMPTOMS ARE NORMAL. YOU MAY EXPERIENCE GENERALIZED MUSCLE ACHES, THROAT IRRITATION, HEADACHE AND/OR SOME NAUSEA.    FOR 24 HOURS DO NOT:  Drive, operate machinery or run household appliances.  Drink beer or alcoholic beverages.   Make important decisions or sign legal documents.    SPECIAL INSTRUCTIONS: SEE ATTACHED SHEET    DIET: To avoid nausea, slowly advance diet as tolerated, avoiding spicy or greasy foods for the first day.  Add more substantial food to your diet according to your physician's instructions.  INCREASE FLUIDS AND FIBER TO AVOID CONSTIPATION.    SURGICAL DRESSING/BATHING: Change drip pad as needed. Do NOT blow nose. Okay to shower in 24h. No hot steamy showers. Leave steri-strips in place.    FOLLOW-UP APPOINTMENT:  A follow-up appointment should be arranged with your doctor, call to schedule.    You should CALL YOUR PHYSICIAN if you develop:  Fever greater than 101 degrees F.  Pain not relieved by medication, or persistent nausea or vomiting.  Excessive bleeding (blood soaking through dressing) or unexpected drainage from the wound.  Extreme redness or swelling around the incision site, drainage of pus or foul smelling drainage.  Inability to urinate or empty your bladder within 8 hours.  Problems with breathing or chest pain.    You should call 911 if you develop problems with breathing or chest pain.  If you are unable to contact your doctor or surgical center, you should go to the nearest emergency room or urgent care center.  Physician's telephone #: [phone]    If any questions arise, call your doctor.  If your doctor is not available, please feel free to call the Surgical Center at [phone].  The Center is open Monday  through Friday from 7AM to 7PM.  You can also call the HEALTH HOTLINE open 24 hours/day, 7 days/week and speak to a nurse at [phone], or toll free at [phone].    A registered nurse may call you a few days after your surgery to see how you are doing after your procedure.    MEDICATIONS: Resume taking daily medication.  Take prescribed pain medication with food.  If no medication is prescribed, you may take non-aspirin pain medication if needed.  PAIN MEDICATION CAN BE VERY CONSTIPATING.  Take a stool softener or laxative such as senokot, pericolace, or milk of magnesia if needed.    Prescription given for Norco and Omnicef.  Last pain medication given at 10:49 (Oxycodone).    If your physician has prescribed pain medication that includes Acetaminophen (Tylenol), do not take additional Acetaminophen (Tylenol) while taking the prescribed medication.    Depression / Suicide Risk    As you are discharged from this [facility] facility, it is important to learn how to keep safe from harming yourself.    Recognize the warning signs:  · Abrupt changes in personality, positive or negative- including increase in energy   · Giving away possessions  · Change in eating patterns- significant weight changes-  positive or negative  · Change in sleeping patterns- unable to sleep or sleeping all the time   · Unwillingness or inability to communicate  · Depression  · Unusual sadness, discouragement and loneliness  · Talk of wanting to die  · Neglect of personal appearance   · Rebelliousness- reckless behavior  · Withdrawal from people/activities they love  · Confusion- inability to concentrate     If you or a loved one observes any of these behaviors or has concerns about self-harm, here's what you can do:  · Talk about it- your feelings and reasons for harming yourself  · Remove any means that you might use to hurt yourself (examples: pills, rope, extension cords, firearm)  · Get professional help from the community  (Mental Health, Substance Abuse, psychological counseling)  · Do not be alone:Call your Safe Contact- someone whom you trust who will be there for you.  · Call your local CRISIS HOTLINE 192-1164 or 869-748-5915  · Call your local Children's Mobile Crisis Response Team [place] (074) 958-4352 or www.[employer]  · Call the toll free National Suicide Prevention Hotlines   · National Suicide Prevention Lifeline 022-552-SRAF (4070)  · National Hope Line Network 800-SUICIDE (236-1510)

## 2020-06-04 NOTE — ANESTHESIA QCDR
2019 [facility] Clinical Data Registry (for Quality Improvement)     Postoperative nausea/vomiting risk protocol (Adult = 18 yrs and Pediatric 3-17 yrs)- (430 and 463)  General inhalation anesthetic (NOT TIVA) with PONV risk factors: Yes  Provision of anti-emetic therapy with at least 2 different classes of agents: Yes   Patient DID NOT receive anti-emetic therapy and reason is documented in Medical Record:  N/A    Multimodal Pain Management- (477)  Non-emergent surgery AND patient age >= 18: Yes  Use of Multimodal Pain Management, two or more drugs and/or interventions, NOT including systemic opioids:   Exception: Documented allergy to multiple classes of analgesics:     Smoking Abstinence (404)  Patient is current smoker (cigarette, pipe, e-cig, marijuanna): No  Elective Surgery:   Abstinence instructions provided prior to day of surgery:   Patient abstained from smoking on day of surgery:     Pre-Op Beta-Blocker in Isolated CABG (44)  Isolated CABG AND patient age >= 18: No  Beta-blocker admin within 24 hours of surgical incision:   Exception:of medical reason(s) for not administering beta blocker within 24 hours prior to surgical incision (e.g., not  indicated,other medical reason):     PACU assessment of acute postoperative pain prior to Anesthesia Care End- Applies to Patients Age = 18- (ABG7)  Initial PACU pain score is which of the following: < 7/10  Patient unable to report pain score: N/A    Post-anesthetic transfer of care checklist/protocol to PACU/ICU- (426 and 427)  Upon conclusion of case, patient transferred to which of the following locations: PACU/Non-ICU  Use of transfer checklist/protocol: Yes  Exclusion: Service Performed in Patient Hospital Room (and thus did not require transfer): N/A  Unplanned admission to ICU related to anesthesia service up through end of PACU care- (MD51)  Unplanned admission to ICU (not initially anticipated at anesthesia start time): No

## 2020-06-04 NOTE — OR SURGEON
Immediate Post OP Note    PreOp Diagnosis: NSD, turbinate hypertrophy, nasal vestibular stenosis with valve collapse    PostOp Diagnosis: same    Procedure(s):  SEPTOPLASTY, NOSE - Wound Class: Clean Contaminated  REPAIR OR RECONSTRUCTION, NASAL VALVE - W/LATERAL WALL NASAL IMPLANTS - Wound Class: Clean Contaminated  SMR NASAL TURBINATE bilateral- Wound Class: Clean Contaminated    Surgeon(s):  Glenn Block M.D.    Anesthesiologist/Type of Anesthesia:  Anesthesiologist: Titus Gunderson M.D./General    Surgical Staff:  Circulator: Lynette Carroll R.N.; Krysta Lovell R.N.  Scrub Person: Tasha Almaguer    Specimens removed if any:  * No specimens in log *    Estimated Blood Loss: 60-75  Findings: Latera implants    Complications: none      6/4/2020 10:08 AM Glenn Block M.D.

## 2020-06-04 NOTE — ANESTHESIA TIME REPORT
Anesthesia Start and Stop Event Times     Date Time Event    6/4/2020 0727 Ready for Procedure     0835 Anesthesia Start     1015 Anesthesia Stop        Responsible Staff  06/04/20    Name Role Begin End    Titus Gunderson M.D. Anesth 0835 1015        Preop Diagnosis (Free Text):  Pre-op Diagnosis     DEVIATED NASAL SEPTUM, HOARSENESS        Preop Diagnosis (Codes):    Post op Diagnosis  Deviated septum      Premium Reason  Non-Premium    Comments:

## 2020-06-04 NOTE — OR NURSING
1012 -- Pt arrived from OR. Report received. Connected to monitor. 8L O2 via mask. Steri-strips to nose, scant bleeding noted.     1020 -- Gauze sling placed to nose with eye ice pack. Weaning O2.    1049 -- Oxycodone given. Tolerating sips of water well. Weaning O2.    1139 -- Pt doing well, states pain 1/10. Feeling sleepy, still wants to rest a little longer.    1153 -- Awake and ready to transition to Phase 2. Report to RIC Cadena. Transferred with all belongings and chart to phase 2.

## 2020-06-04 NOTE — OR NURSING
1153 Pt to PACU II from phase I. Report fromEDGARDO LUDWIG RN. Declines pain/nausea. Respirations even and unlabored. VSS. Drip pad w/o bleeding, states readiness for dc.    1205 Called ride for pickup. Report to RIC Dale.     1235 Returned from break. Pt sitting up in recliner chair.    1250 Discharge orders received. Meets discharge criteria at this time. No pain. No nausea. Tolerating PO. On RA. All lines and monitors discontinued. Reviewed discharge paperwork with pt and spouse. Discussed diet, activity, medications, follow up care and worsening symptoms. No questions at this time. Pt to be discharged to home via private vehicle. Offered hospital escort and wc, pt declined, ambulated out of department with RN, spouse, and all belongings.

## 2020-06-04 NOTE — OP REPORT
DATE OF SERVICE:  06/04/2020    SURGEON:  Glenn Block MD    ASSISTANT:  None.    ANESTHESIA:  General given by endotracheal tube.    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    NAMES OF THE PROCEDURES:  Septoplasty, repair of nasal vestibular stenosis   with lateral wall reconstruction with implant; bilateral SMR turbinates.    INDICATIONS:  Patient is an 79-year-old man who has had difficulty with nasal   obstruction on a longstanding basis, which is getting progressively worse.  He   has been dependent on Afrin nasal spray and has developed rhinitis   medicamentosa.  He has been unable to stop the Afrin nasal spray despite   steroid treatment.  He is noted to have a significant septal deformity as well   as turbinate hypertrophy on examination along with nasal vestibular stenosis   with valve collapse bilaterally.  He presents now for surgical intervention on   elective basis.    DESCRIPTION:  Patient was brought in the operating room, placed in supine   position on the operating room table.  General anesthesia was induced and the   patient was endotracheally intubated without difficulty.  Eyes were protected   with taping.  Table was turned 90 degrees.    Approximately 4 mL of 4% cocaine solution applied topically in cottonoid   pledgets on both sides of the nose.  Vibrissae were trimmed.  Approximately   3/4 to 1 mL of 1% Xylocaine with 1:100,000 units of epinephrine solution was   used to infiltrate along the lateral nasal dorsum bilaterally in anticipation   of lateral wall implants.  The cottonoid pledgets were then removed from the   nose and an additional approximate 15 mL of the 1% Xylocaine with epinephrine   was  used to infiltrate along the nasal septum bilaterally as well as the   inferior turbinate areas bilaterally.  Cottonoid pledgets were then   repositioned in the nose.  The external nose and mid face were prepped with   Betadine and the nasal vestibule areas were also prepped with Betadine.    Patient was subsequently draped to expose the mid face and nose for surgery.    Following removal of cottonoid pledgets, the patient was noted to have a   septal deflection to the right superiorly and anteriorly with a large spur   extending inferiorly on the left side from the anterior maxillary crest area   into the middle meatal area posteriorly.  Turbinate hypertrophy was also noted   bilaterally.    A left hemitransfixion incision was made followed by creation of anterior and   posterior tunnels on the left hand side in the submucoperichondrial and   submucoperiosteal planes.  Care was taken with elevation over the area of the   spur formation inferiorly.  Small fenestration was noted along the posterior   aspect of the spur into the middle meatal area.  Bony cartilaginous junction   was identified and divided to allow for creation of a posterior tunnel on the   right hand side in the submucoperiosteal plane.  The anterior septum was then   taken down sharply off the maxillary crest.  There was noted to be some   deflection of the maxillary crest as well as redundancy of the anterior septal   cartilage leading to the spur towards the left side.  The anterior septal   cartilage was divided at the maxillary crest into the area of angulation with   resection of the redundant cartilage along its inferior aspect.  The maxillary   crest was fractured and displaced towards the midline.  A portion of the   perpendicular plate of the ethmoid, which was thickened and deflecting to the   right superiorly, was fractured and removed using Jose forceps.  A   portion of the vomeric bone posteriorly contributing to the  posterior septal   spur was also removed using Ryder forceps.    At this point, the concave aspect of the anterior septal cartilage on the left   hand side was crosshatched with a #15 blade to break the spring.  The septal   cartilage anteriorly was then repositioned onto the maxillary crest more in   the midline. A portion of the perpendicular plate of the ethmoid bone was   trimmed and flattened and then repositioned into the posterior septum   superiorly.  A small portion of extra cartilage was morselized and   repositioned into the posterior septal area inferiorly.  A hemitransfixion   incision was then closed using a 4-0 chromic suture in a running locked   fashion.  A 4-0 chromic was also used in a running horizontal mattress-type   quilting fashion to repose mucosal flaps.    Attention was then directed to the left inferior turbinate.  The anterior   leading edge was first cauterized and then incised.  Mucosa and submucosa were   elevated from the medial and lateral aspects of the anterior portion of the   inferior turbinate bone.  Small portions of the inferior turbinate bone were   then resected in a piecemeal fashion with Ryder forceps.  Remaining   submucosal tissue was intramurally cauterized.  The remaining turbinate was   outfractured.    Attention was then directed to the right inferior turbinate.  In a similar   fashion, the anterior leading edge was first cauterized and then incised.    Mucosa and submucosa were elevated from the medial and lateral aspects of the   anterior portion of the inferior turbinate bone.  Small portions of the   inferior turbinate bone were resected anteriorly followed by cauterization of   the submucosal tissue with a suction cautery.  The remaining turbinate was   outfractured.    At this point,  Zane bivalved intranasal splints placed bilaterally along   the nasal septum and secured with a 3-0 nylon suture.  Surgiflo was applied to   the inferior meatus and  posterior septal areas.    Attention was then directed to the external nasal dorsum.  The Latera implant   system was utilized with a template being used to dre positioning of the   implant placement.  Once positioning has been delineated on both sides, a   Latera implant was inserted into the insertion device.  The insertion device   was then introduced via the left nasal vestibule just along the caudal aspect   of the lower lateral cartilage and advanced lateral to the lower lateral and   upper lateral cartilages to the nasal bone area.  It was advanced on to the   nasal bone to the area of demarcation.  The Latera implant has been deployed   using the introduction device.  Satisfactory positioning appears to be   present.    Attention was then directed to the right nasal valve area.  In a similar   fashion, the Latera implant was inserted lateral to the lower lateral and   upper lateral cartilages extending onto the inferior aspect of the nasal bone.    Once deployed, adequate positioning was noted.  Steri-Strips were then   applied to the external nasal dorsum.    At this point, the patient was allowed to awaken from anesthesia.  There was   some excessive bleeding from the nasal cavity on both sides noted with   coughing and increased blood pressure.  Additional Surgiflo was placed in the   nose followed by application of NasoPore packs bilaterally along the inferior   aspect of the nose along the inferior turbinates.  The patient was   subsequently extubated without difficulty and taken from the operating room to   the recovery area, awake, breathing on his own in stable condition.  There   were no apparent complications.  Blood loss was felt to be approximately 60-75   mL.       ____________________________________     MD JUANITO CASTELLANO / PEPE    DD:  06/04/2020 10:28:30  DT:  06/04/2020 12:45:43    D#:  [id number]  Job#:  [id number]

## 2020-06-08 ASSESSMENT — PAIN SCALES - GENERAL: PAIN_LEVEL: 1

## 2020-06-08 NOTE — ANESTHESIA POSTPROCEDURE EVALUATION
Patient: Jett Nava    Procedure Summary     Date:  06/04/20 Room / Location:  [facility] ROOM 23 / SURGERY SAME DAY [facility]    Anesthesia Start:  0835 Anesthesia Stop:  1015    Procedures:       SEPTOPLASTY, NOSE (N/A Nose)      REPAIR OR RECONSTRUCTION, NASAL VALVE - W/LATERAL NASAL IMPLANTS (N/A Nose)      REDUCTION, NASAL TURBINATE (Bilateral Nose) Diagnosis:  (DEVIATED NASAL SEPTUM, HOARSENESS)    Surgeon:  Glenn Block M.D. Responsible Provider:  Titus Gunderson M.D.    Anesthesia Type:  general ASA Status:  3          Final Anesthesia Type: general  Last vitals  BP wnl       Temp wnl   Pulse wnl   Resp wnl   SpO2 wnl     Anesthesia Post Evaluation    Patient location during evaluation: PACU  Patient participation: complete - patient participated  Level of consciousness: awake and alert  Pain score: 1    Airway patency: patent  Anesthetic complications: no  Cardiovascular status: hemodynamically stable  Respiratory status: acceptable  Hydration status: euvolemic    PONV: none           Nurse Pain Score: 0 (NPRS)

## 2020-11-02 ENCOUNTER — APPOINTMENT (OUTPATIENT)
Dept: RADIOLOGY | Facility: MEDICAL CENTER | Age: 80
End: 2020-11-02
Attending: EMERGENCY MEDICINE
Payer: MEDICARE

## 2020-11-02 ENCOUNTER — APPOINTMENT (OUTPATIENT)
Dept: RADIOLOGY | Facility: MEDICAL CENTER | Age: 80
End: 2020-11-02
Attending: STUDENT IN AN ORGANIZED HEALTH CARE EDUCATION/TRAINING PROGRAM
Payer: MEDICARE

## 2020-11-02 ENCOUNTER — HOSPITAL ENCOUNTER (OUTPATIENT)
Facility: MEDICAL CENTER | Age: 80
End: 2020-11-03
Attending: EMERGENCY MEDICINE | Admitting: STUDENT IN AN ORGANIZED HEALTH CARE EDUCATION/TRAINING PROGRAM
Payer: MEDICARE

## 2020-11-02 ENCOUNTER — APPOINTMENT (OUTPATIENT)
Dept: CARDIOLOGY | Facility: MEDICAL CENTER | Age: 80
End: 2020-11-02
Attending: STUDENT IN AN ORGANIZED HEALTH CARE EDUCATION/TRAINING PROGRAM
Payer: MEDICARE

## 2020-11-02 DIAGNOSIS — R47.01 APHASIA: ICD-10-CM

## 2020-11-02 DIAGNOSIS — G45.9 TIA (TRANSIENT ISCHEMIC ATTACK): Primary | ICD-10-CM

## 2020-11-02 PROBLEM — E03.9 HYPOTHYROIDISM: Status: ACTIVE | Noted: 2020-11-02

## 2020-11-02 PROBLEM — N40.0 BPH (BENIGN PROSTATIC HYPERPLASIA): Status: ACTIVE | Noted: 2020-11-02

## 2020-11-02 PROBLEM — H40.9 GLAUCOMA: Status: ACTIVE | Noted: 2020-11-02

## 2020-11-02 LAB
ABO + RH BLD: NORMAL
ABO GROUP BLD: NORMAL
ALBUMIN SERPL BCP-MCNC: 4.1 G/DL (ref 3.2–4.9)
ALBUMIN/GLOB SERPL: 1.6 G/DL
ALP SERPL-CCNC: 93 U/L (ref 30–99)
ALT SERPL-CCNC: 16 U/L (ref 2–50)
ANION GAP SERPL CALC-SCNC: 7 MMOL/L (ref 7–16)
APTT PPP: 27.2 SEC (ref 24.7–36)
AST SERPL-CCNC: 11 U/L (ref 12–45)
BASOPHILS # BLD AUTO: 0.7 % (ref 0–1.8)
BASOPHILS # BLD: 0.07 K/UL (ref 0–0.12)
BILIRUB SERPL-MCNC: 0.7 MG/DL (ref 0.1–1.5)
BLD GP AB SCN SERPL QL: NORMAL
BUN SERPL-MCNC: 12 MG/DL (ref 8–22)
CALCIUM SERPL-MCNC: 8.8 MG/DL (ref 8.5–10.5)
CHLORIDE SERPL-SCNC: 104 MMOL/L (ref 96–112)
CO2 SERPL-SCNC: 28 MMOL/L (ref 20–33)
CREAT SERPL-MCNC: 0.82 MG/DL (ref 0.5–1.4)
EOSINOPHIL # BLD AUTO: 0.3 K/UL (ref 0–0.51)
EOSINOPHIL NFR BLD: 3.1 % (ref 0–6.9)
ERYTHROCYTE [DISTWIDTH] IN BLOOD BY AUTOMATED COUNT: 46.1 FL (ref 35.9–50)
EST. AVERAGE GLUCOSE BLD GHB EST-MCNC: 111 MG/DL
GLOBULIN SER CALC-MCNC: 2.6 G/DL (ref 1.9–3.5)
GLUCOSE SERPL-MCNC: 96 MG/DL (ref 65–99)
HBA1C MFR BLD: 5.5 % (ref 0–5.6)
HCT VFR BLD AUTO: 49.5 % (ref 42–52)
HGB BLD-MCNC: 16.3 G/DL (ref 14–18)
IMM GRANULOCYTES # BLD AUTO: 0.06 K/UL (ref 0–0.11)
IMM GRANULOCYTES NFR BLD AUTO: 0.6 % (ref 0–0.9)
INR PPP: 1.01 (ref 0.87–1.13)
LV EJECT FRACT  99904: 55
LV EJECT FRACT MOD 2C 99903: 61.44
LV EJECT FRACT MOD 4C 99902: 62.16
LV EJECT FRACT MOD BP 99901: 61.15
LYMPHOCYTES # BLD AUTO: 1.32 K/UL (ref 1–4.8)
LYMPHOCYTES NFR BLD: 13.7 % (ref 22–41)
MCH RBC QN AUTO: 31 PG (ref 27–33)
MCHC RBC AUTO-ENTMCNC: 32.9 G/DL (ref 33.7–35.3)
MCV RBC AUTO: 94.3 FL (ref 81.4–97.8)
MONOCYTES # BLD AUTO: 0.88 K/UL (ref 0–0.85)
MONOCYTES NFR BLD AUTO: 9.2 % (ref 0–13.4)
NEUTROPHILS # BLD AUTO: 6.98 K/UL (ref 1.82–7.42)
NEUTROPHILS NFR BLD: 72.7 % (ref 44–72)
NRBC # BLD AUTO: 0 K/UL
NRBC BLD-RTO: 0 /100 WBC
PLATELET # BLD AUTO: 167 K/UL (ref 164–446)
PMV BLD AUTO: 9.8 FL (ref 9–12.9)
POTASSIUM SERPL-SCNC: 4 MMOL/L (ref 3.6–5.5)
PROT SERPL-MCNC: 6.7 G/DL (ref 6–8.2)
PROTHROMBIN TIME: 13.6 SEC (ref 12–14.6)
RBC # BLD AUTO: 5.25 M/UL (ref 4.7–6.1)
RH BLD: NORMAL
SODIUM SERPL-SCNC: 139 MMOL/L (ref 135–145)
TROPONIN T SERPL-MCNC: 7 NG/L (ref 6–19)
TSH SERPL DL<=0.005 MIU/L-ACNC: 1.2 UIU/ML (ref 0.38–5.33)
WBC # BLD AUTO: 9.6 K/UL (ref 4.8–10.8)

## 2020-11-02 PROCEDURE — 80053 COMPREHEN METABOLIC PANEL: CPT

## 2020-11-02 PROCEDURE — 86850 RBC ANTIBODY SCREEN: CPT

## 2020-11-02 PROCEDURE — 99285 EMERGENCY DEPT VISIT HI MDM: CPT

## 2020-11-02 PROCEDURE — A9270 NON-COVERED ITEM OR SERVICE: HCPCS | Performed by: STUDENT IN AN ORGANIZED HEALTH CARE EDUCATION/TRAINING PROGRAM

## 2020-11-02 PROCEDURE — G0378 HOSPITAL OBSERVATION PER HR: HCPCS

## 2020-11-02 PROCEDURE — 99220 PR INITIAL OBSERVATION CARE,LEVL III: CPT | Performed by: STUDENT IN AN ORGANIZED HEALTH CARE EDUCATION/TRAINING PROGRAM

## 2020-11-02 PROCEDURE — 85610 PROTHROMBIN TIME: CPT

## 2020-11-02 PROCEDURE — 83036 HEMOGLOBIN GLYCOSYLATED A1C: CPT

## 2020-11-02 PROCEDURE — 94760 N-INVAS EAR/PLS OXIMETRY 1: CPT

## 2020-11-02 PROCEDURE — 85730 THROMBOPLASTIN TIME PARTIAL: CPT

## 2020-11-02 PROCEDURE — 71045 X-RAY EXAM CHEST 1 VIEW: CPT

## 2020-11-02 PROCEDURE — 70450 CT HEAD/BRAIN W/O DYE: CPT

## 2020-11-02 PROCEDURE — 70496 CT ANGIOGRAPHY HEAD: CPT

## 2020-11-02 PROCEDURE — 70498 CT ANGIOGRAPHY NECK: CPT

## 2020-11-02 PROCEDURE — 70551 MRI BRAIN STEM W/O DYE: CPT

## 2020-11-02 PROCEDURE — 86901 BLOOD TYPING SEROLOGIC RH(D): CPT

## 2020-11-02 PROCEDURE — 700117 HCHG RX CONTRAST REV CODE 255: Performed by: EMERGENCY MEDICINE

## 2020-11-02 PROCEDURE — 86900 BLOOD TYPING SEROLOGIC ABO: CPT

## 2020-11-02 PROCEDURE — 84484 ASSAY OF TROPONIN QUANT: CPT

## 2020-11-02 PROCEDURE — 99203 OFFICE O/P NEW LOW 30 MIN: CPT | Performed by: PSYCHIATRY & NEUROLOGY

## 2020-11-02 PROCEDURE — 93005 ELECTROCARDIOGRAM TRACING: CPT | Performed by: EMERGENCY MEDICINE

## 2020-11-02 PROCEDURE — 93306 TTE W/DOPPLER COMPLETE: CPT | Mod: 26 | Performed by: INTERNAL MEDICINE

## 2020-11-02 PROCEDURE — 85025 COMPLETE CBC W/AUTO DIFF WBC: CPT

## 2020-11-02 PROCEDURE — 700102 HCHG RX REV CODE 250 W/ 637 OVERRIDE(OP): Performed by: STUDENT IN AN ORGANIZED HEALTH CARE EDUCATION/TRAINING PROGRAM

## 2020-11-02 PROCEDURE — C9803 HOPD COVID-19 SPEC COLLECT: HCPCS | Performed by: INTERNAL MEDICINE

## 2020-11-02 PROCEDURE — 84443 ASSAY THYROID STIM HORMONE: CPT

## 2020-11-02 PROCEDURE — 93306 TTE W/DOPPLER COMPLETE: CPT

## 2020-11-02 RX ORDER — ASPIRIN 325 MG
325 TABLET ORAL DAILY
Status: DISCONTINUED | OUTPATIENT
Start: 2020-11-02 | End: 2020-11-02

## 2020-11-02 RX ORDER — ATORVASTATIN CALCIUM 40 MG/1
40 TABLET, FILM COATED ORAL NIGHTLY
Status: DISCONTINUED | OUTPATIENT
Start: 2020-11-02 | End: 2020-11-03 | Stop reason: HOSPADM

## 2020-11-02 RX ORDER — ACETAMINOPHEN 325 MG/1
650 TABLET ORAL EVERY 6 HOURS PRN
Status: DISCONTINUED | OUTPATIENT
Start: 2020-11-02 | End: 2020-11-03 | Stop reason: HOSPADM

## 2020-11-02 RX ORDER — BISACODYL 10 MG
10 SUPPOSITORY, RECTAL RECTAL
Status: DISCONTINUED | OUTPATIENT
Start: 2020-11-02 | End: 2020-11-03 | Stop reason: HOSPADM

## 2020-11-02 RX ORDER — ESCITALOPRAM OXALATE 10 MG/1
10 TABLET ORAL
COMMUNITY

## 2020-11-02 RX ORDER — TIMOLOL MALEATE 5 MG/ML
1 SOLUTION/ DROPS OPHTHALMIC 2 TIMES DAILY
COMMUNITY

## 2020-11-02 RX ORDER — LABETALOL HYDROCHLORIDE 5 MG/ML
10 INJECTION, SOLUTION INTRAVENOUS EVERY 4 HOURS PRN
Status: DISCONTINUED | OUTPATIENT
Start: 2020-11-02 | End: 2020-11-03 | Stop reason: HOSPADM

## 2020-11-02 RX ORDER — VITAMIN B COMPLEX
1000 TABLET ORAL DAILY
Status: DISCONTINUED | OUTPATIENT
Start: 2020-11-03 | End: 2020-11-03 | Stop reason: HOSPADM

## 2020-11-02 RX ORDER — ESCITALOPRAM OXALATE 10 MG/1
10 TABLET ORAL
Status: DISCONTINUED | OUTPATIENT
Start: 2020-11-02 | End: 2020-11-03 | Stop reason: HOSPADM

## 2020-11-02 RX ORDER — TAMSULOSIN HYDROCHLORIDE 0.4 MG/1
0.4 CAPSULE ORAL SEE ADMIN INSTRUCTIONS
Status: DISCONTINUED | OUTPATIENT
Start: 2020-11-02 | End: 2020-11-03 | Stop reason: HOSPADM

## 2020-11-02 RX ORDER — UBIDECARENONE 75 MG
100 CAPSULE ORAL DAILY
Status: DISCONTINUED | OUTPATIENT
Start: 2020-11-03 | End: 2020-11-03 | Stop reason: HOSPADM

## 2020-11-02 RX ORDER — ASPIRIN 300 MG/1
300 SUPPOSITORY RECTAL DAILY
Status: DISCONTINUED | OUTPATIENT
Start: 2020-11-02 | End: 2020-11-02

## 2020-11-02 RX ORDER — AMOXICILLIN 250 MG
2 CAPSULE ORAL 2 TIMES DAILY
Status: DISCONTINUED | OUTPATIENT
Start: 2020-11-02 | End: 2020-11-03 | Stop reason: HOSPADM

## 2020-11-02 RX ORDER — ASPIRIN 81 MG/1
324 TABLET, CHEWABLE ORAL DAILY
Status: DISCONTINUED | OUTPATIENT
Start: 2020-11-02 | End: 2020-11-02

## 2020-11-02 RX ORDER — POLYETHYLENE GLYCOL 3350 17 G/17G
1 POWDER, FOR SOLUTION ORAL
Status: DISCONTINUED | OUTPATIENT
Start: 2020-11-02 | End: 2020-11-03 | Stop reason: HOSPADM

## 2020-11-02 RX ORDER — LEVOTHYROXINE SODIUM 0.03 MG/1
25 TABLET ORAL
Status: DISCONTINUED | OUTPATIENT
Start: 2020-11-03 | End: 2020-11-03 | Stop reason: HOSPADM

## 2020-11-02 RX ORDER — BRIMONIDINE TARTRATE 2 MG/ML
1 SOLUTION/ DROPS OPHTHALMIC 2 TIMES DAILY
Status: DISCONTINUED | OUTPATIENT
Start: 2020-11-02 | End: 2020-11-03 | Stop reason: HOSPADM

## 2020-11-02 RX ORDER — FINASTERIDE 5 MG/1
5 TABLET, FILM COATED ORAL DAILY
Status: DISCONTINUED | OUTPATIENT
Start: 2020-11-02 | End: 2020-11-03 | Stop reason: HOSPADM

## 2020-11-02 RX ORDER — DORZOLAMIDE HCL 20 MG/ML
1 SOLUTION/ DROPS OPHTHALMIC 2 TIMES DAILY
Status: DISCONTINUED | OUTPATIENT
Start: 2020-11-02 | End: 2020-11-03 | Stop reason: HOSPADM

## 2020-11-02 RX ORDER — TIMOLOL MALEATE 5 MG/ML
1 SOLUTION/ DROPS OPHTHALMIC 2 TIMES DAILY
Status: DISCONTINUED | OUTPATIENT
Start: 2020-11-02 | End: 2020-11-03 | Stop reason: HOSPADM

## 2020-11-02 RX ADMIN — IOHEXOL 80 ML: 350 INJECTION, SOLUTION INTRAVENOUS at 13:30

## 2020-11-02 RX ADMIN — ACETAMINOPHEN 650 MG: 325 TABLET, FILM COATED ORAL at 23:15

## 2020-11-02 ASSESSMENT — FIBROSIS 4 INDEX
FIB4 SCORE: 1.35
FIB4 SCORE: 2.52

## 2020-11-02 ASSESSMENT — ENCOUNTER SYMPTOMS
CHILLS: 0
COUGH: 0
VOMITING: 0
BLURRED VISION: 0
MYALGIAS: 0
SHORTNESS OF BREATH: 0
DOUBLE VISION: 0
PALPITATIONS: 0
HEADACHES: 1
SPEECH CHANGE: 1
FOCAL WEAKNESS: 0
DEPRESSION: 0
SINUS PAIN: 0
FEVER: 0
NAUSEA: 0
ABDOMINAL PAIN: 0

## 2020-11-02 ASSESSMENT — COGNITIVE AND FUNCTIONAL STATUS - GENERAL
SUGGESTED CMS G CODE MODIFIER MOBILITY: CH
MOBILITY SCORE: 24
DAILY ACTIVITIY SCORE: 24
SUGGESTED CMS G CODE MODIFIER DAILY ACTIVITY: CH

## 2020-11-02 ASSESSMENT — LIFESTYLE VARIABLES
TOTAL SCORE: 0
HOW MANY TIMES IN THE PAST YEAR HAVE YOU HAD 5 OR MORE DRINKS IN A DAY: 0
ALCOHOL_USE: NO
EVER FELT BAD OR GUILTY ABOUT YOUR DRINKING: NO
CONSUMPTION TOTAL: NEGATIVE
ON A TYPICAL DAY WHEN YOU DRINK ALCOHOL HOW MANY DRINKS DO YOU HAVE: 0
EVER HAD A DRINK FIRST THING IN THE MORNING TO STEADY YOUR NERVES TO GET RID OF A HANGOVER: NO
TOTAL SCORE: 0
HAVE PEOPLE ANNOYED YOU BY CRITICIZING YOUR DRINKING: NO
AVERAGE NUMBER OF DAYS PER WEEK YOU HAVE A DRINK CONTAINING ALCOHOL: 0
HAVE YOU EVER FELT YOU SHOULD CUT DOWN ON YOUR DRINKING: NO
TOTAL SCORE: 0

## 2020-11-02 ASSESSMENT — PATIENT HEALTH QUESTIONNAIRE - PHQ9
SUM OF ALL RESPONSES TO PHQ9 QUESTIONS 1 AND 2: 0
1. LITTLE INTEREST OR PLEASURE IN DOING THINGS: NOT AT ALL
2. FEELING DOWN, DEPRESSED, IRRITABLE, OR HOPELESS: NOT AT ALL

## 2020-11-02 NOTE — ED PROVIDER NOTES
ED Provider Note    CHIEF COMPLAINT  Chief Complaint   Patient presents with   • Possible Stroke       HPI  Jett Nava is a 80 y.o. male who presents by ambulance as a stroke alert, at 9 AM, about 4 hours ago, he had an episode lasting about 10 to 15 minutes of difficulty speaking described as word salad, witnessed by his wife.  Resolved spontaneously and since then his only complaint has been a headache in the center of his forehead.  No weakness or numbness or tingling of the extremities.  He does have some neck pain however.  Extensive past medical history including coronary disease and cerebrovascular disease, migraine headaches as well.  At this point he offers no other acute complaints.    REVIEW OF SYSTEMS  Negative for fever, rash, chest pain, dyspnea, abdominal pain, nausea, vomiting, diarrhea, focal weakness, focal numbness, focal tingling, back pain. All other systems are negative.     PAST MEDICAL HISTORY  Past Medical History:   Diagnosis Date   • Arthritis     osteo/ fingers hips   • Bowel habit changes     constipation   • Cataract     gianna iol   • Coronary artery disease    • Disorder of thyroid    • Glaucoma    • Headache(784.0)    • Heart burn    • Heart valve disease    • Indigestion    • Migraine    • Muscle disorder    • Psychiatric problem     anxiety   • Stroke ([facility]) 02/17/2017    no weakness/problems post stroke       FAMILY HISTORY  Family History   Problem Relation Age of Onset   • Arthritis Mother    • Genetic Disorder Mother    • Cancer Father    • Alcohol/Drug Father    • Arthritis Brother    • Genetic Disorder Brother    • Alcohol/Drug Brother    • Alcohol/Drug Maternal Aunt    • Stroke Maternal Uncle    • Alcohol/Drug Maternal Uncle    • Genetic Disorder Paternal Uncle    • Genetic Disorder Maternal Grandmother    • Stroke Maternal Grandfather    • Genetic Disorder Paternal Grandmother    • Psychiatric Illness Paternal Grandmother        SOCIAL HISTORY  Social History  "    Tobacco Use   • Smoking status: Former Smoker     Packs/day: 1.00     Types: Cigarettes, Pipe     Quit date: 1970     Years since quittin.8   • Smokeless tobacco: Never Used   Substance Use Topics   • Alcohol use: Yes     Alcohol/week: 0.5 oz     Types: 1 Glasses of wine per week     Comment: 1 per day   • Drug use: No       SURGICAL HISTORY  Past Surgical History:   Procedure Laterality Date   • PB REPAIR OF NASAL SEPTUM N/A 2020    Procedure: SEPTOPLASTY, NOSE;  Surgeon: Glenn Block M.D.;  Location: SURGERY SAME DAY [facility];  Service: Ent   • REPAIR OR RECONSTRUCTION, NASAL VALVE N/A 2020    Procedure: REPAIR OR RECONSTRUCTION, NASAL VALVE - W/LATERAL NASAL IMPLANTS;  Surgeon: Glenn Block M.D.;  Location: SURGERY SAME DAY [facility];  Service: Ent   • TURBINATE REDUCTION Bilateral 2020    Procedure: REDUCTION, NASAL TURBINATE;  Surgeon: Glenn Block M.D.;  Location: SURGERY SAME DAY [facility];  Service: Ent   • KNEE ARTHROPLASTY TOTAL Left 2019    Procedure: KNEE ARTHROPLASTY TOTAL;  Surgeon: Dennis Oliveira M.D.;  Location: SURGERY [facility];  Service: Orthopedics   • OTHER CARDIAC SURGERY      heart valve   • OTHER ORTHOPEDIC SURGERY      right wrist thumb   • OTHER ORTHOPEDIC SURGERY      left wrist/thumb   • OTHER NEUROLOGICAL SURG      L5 S1 spinal fusion   • CATARACT PHACO WITH IOL     • KNEE ARTHROPLASTY TOTAL     • OPEN REDUCTION     • TIBIA ORIF     • TURP-VAPOR         CURRENT MEDICATIONS  I personally reviewed the medication list in the charting documentation.     ALLERGIES  Allergies   Allergen Reactions   • Celebrex [Celecoxib]      Stomach ache   • Vioxx [Rofecoxib]      Stomach ache       MEDICAL RECORD  I have reviewed patient's medical record and pertinent results are listed above.      PHYSICAL EXAM  VITAL SIGNS: /92   Pulse 77   Temp 36.7 °C (98.1 °F) (Temporal)   Resp 18   Ht 1.803 m (5' 11\")   Wt 86.2 kg (190 " lb)   SpO2 96%   BMI 26.50 kg/m²    Constitutional: Well appearing patient in no acute distress.  Not toxic, nor ill in appearance.  HENT: Normocephalic, no evidence of acute trauma.  Eyes: No scleral icterus. Normal conjunctiva   Neck: Supple, comfortable, nonpainful range of motion.   Cardiovascular: Regular heart rate and rhythm.   Thorax & Lungs: Chest is nontender.  Sternotomy scar noted.  Lungs are clear to auscultation with good air movement bilaterally.  No wheeze, rhonchi, nor rales.   Abdomen: Soft, with no tenderness, rebound nor guarding.  No mass or pulsatile mass appreciated.  Skin: Warm, dry. No rash appreciated  Extremities/Musculoskeletal: No sign of trauma. No asymmetric calf tenderness, erythema or edema. Normal range of motion   Neurologic: AAOx4, Cranial nerves II-XII grossly intact, PERRLA, EOMI, speech is normal, normal and symmetric motor and sensory functions upper and lower extremities bilaterally, finger to nose normal  Psychiatric: Normal affect appropriate for the clinical situation.    DIAGNOSTIC STUDIES / PROCEDURES    LABS/EKGs  Results for orders placed or performed during the hospital encounter of 11/02/20   CBC WITH DIFFERENTIAL   Result Value Ref Range    WBC 9.6 4.8 - 10.8 K/uL    RBC 5.25 4.70 - 6.10 M/uL    Hemoglobin 16.3 14.0 - 18.0 g/dL    Hematocrit 49.5 42.0 - 52.0 %    MCV 94.3 81.4 - 97.8 fL    MCH 31.0 27.0 - 33.0 pg    MCHC 32.9 (L) 33.7 - 35.3 g/dL    RDW 46.1 35.9 - 50.0 fL    Platelet Count 167 164 - 446 K/uL    MPV 9.8 9.0 - 12.9 fL    Neutrophils-Polys 72.70 (H) 44.00 - 72.00 %    Lymphocytes 13.70 (L) 22.00 - 41.00 %    Monocytes 9.20 0.00 - 13.40 %    Eosinophils 3.10 0.00 - 6.90 %    Basophils 0.70 0.00 - 1.80 %    Immature Granulocytes 0.60 0.00 - 0.90 %    Nucleated RBC 0.00 /100 WBC    Neutrophils (Absolute) 6.98 1.82 - 7.42 K/uL    Lymphs (Absolute) 1.32 1.00 - 4.80 K/uL    Monos (Absolute) 0.88 (H) 0.00 - 0.85 K/uL    Eos (Absolute) 0.30 0.00 - 0.51 K/uL     Baso (Absolute) 0.07 0.00 - 0.12 K/uL    Immature Granulocytes (abs) 0.06 0.00 - 0.11 K/uL    NRBC (Absolute) 0.00 K/uL   COMP METABOLIC PANEL   Result Value Ref Range    Sodium 139 135 - 145 mmol/L    Potassium 4.0 3.6 - 5.5 mmol/L    Chloride 104 96 - 112 mmol/L    Co2 28 20 - 33 mmol/L    Anion Gap 7.0 7.0 - 16.0    Glucose 96 65 - 99 mg/dL    Bun 12 8 - 22 mg/dL    Creatinine 0.82 0.50 - 1.40 mg/dL    Calcium 8.8 8.5 - 10.5 mg/dL    AST(SGOT) 11 (L) 12 - 45 U/L    ALT(SGPT) 16 2 - 50 U/L    Alkaline Phosphatase 93 30 - 99 U/L    Total Bilirubin 0.7 0.1 - 1.5 mg/dL    Albumin 4.1 3.2 - 4.9 g/dL    Total Protein 6.7 6.0 - 8.2 g/dL    Globulin 2.6 1.9 - 3.5 g/dL    A-G Ratio 1.6 g/dL   PROTHROMBIN TIME   Result Value Ref Range    PT 13.6 12.0 - 14.6 sec    INR 1.01 0.87 - 1.13   APTT   Result Value Ref Range    APTT 27.2 24.7 - 36.0 sec   TROPONIN   Result Value Ref Range    Troponin T 7 6 - 19 ng/L   ESTIMATED GFR   Result Value Ref Range    GFR If African American >60 >60 mL/min/1.73 m 2    GFR If Non African American >60 >60 mL/min/1.73 m 2        RADIOLOGY  CT-CTA NECK WITH & W/O-POST PROCESSING   Final Result      1.  Mild atherosclerotic plaque at the carotid bifurcations bilaterally. No significant flow-limiting stenosis.      2.  No evidence of carotid or vertebral occlusion or dissection.      CT-CTA HEAD WITH & W/O-POST PROCESS   Final Result      CT angiogram of the [place] within normal limits.      CT-HEAD W/O   Final Result         NO ACUTE ABNORMALITIES ARE NOTED ON CT SCAN OF THE HEAD.      Findings are consistent with atrophy.  Decreased attenuation in the periventricular white matter likely indicates microvascular ischemic disease.      DX-CHEST-PORTABLE (1 VIEW)    (Results Pending)         COURSE & MEDICAL DECISION MAKING  I have reviewed any medical record information, laboratory studies and radiographic results as noted above.  Differential diagnoses includes: CVA, ICH, TIA,  dehydration, electrolyte abnormalities, anemia    Encounter Summary: This is a 80 y.o. male with a resolved episode of expressive aphasia, no other focal neurologic complaints or findings on exam, has a residual headache.  The patient was evaluated the charge desk as a stroke alert, evaluated by myself as well as Dr. Kruger, went directly to CT scanning which included CTAs of the head and neck and perfusion studies, no acute abnormalities were identified on these imaging test.  Blood work is also obtained.  The patient will be admitted to the hospital in guarded condition for further evaluation and treatment of his TIA  Initial NIH stroke scale: 0    DISPOSITION: Admit in guarded condition      FINAL IMPRESSION  1. TIA (transient ischemic attack)    2. Aphasia           This dictation was created using voice recognition software. The accuracy of the dictation is limited to the abilities of the software. I expect there may be some errors of grammar and possibly content. The nursing notes were reviewed and certain aspects of this information were incorporated into this note.    Electronically signed by: Branden Chen M.D., 11/2/2020 1:11 PM

## 2020-11-02 NOTE — ED TRIAGE NOTES
"79 y/o male bib ambulance for evaluation of \"word salad\" which occurred for aprox 10-15 minutes around [id number]. Symptoms resolved completely, his only complaint upon arrival to ED is a mild headache.   "

## 2020-11-02 NOTE — PROGRESS NOTES
[facility] TRIAGE OFFICER ER REPORT  Consult/Admission requested by: Dr. Chen  Chief complaint: Possible stroke  Pertinent history/ER Course: 80-year-old male presents to the hospital as a stroke alert.  Neurology evaluated him and made recommendations.  He underwent imaging studies did not show any acute abnormalities.  Code Status: Full per ERP, I personally verified with the ERP patient's code status and the ERP has confirmed this with the patient.   Patient meets admission criterion: Yes..  Recommendations given or work up & consultations requested per triage officer: None  Consultants involved and pertinent input from consultants: Neurology consulted  Admission status: Observation.   Admission order placed: Yes.   Floor requested: Neurology with telemetry  Assigned hospitalist: Dr. Kessler      CT-CTA NECK WITH & W/O-POST PROCESSING   Final Result      1.  Mild atherosclerotic plaque at the carotid bifurcations bilaterally. No significant flow-limiting stenosis.      2.  No evidence of carotid or vertebral occlusion or dissection.      CT-CTA HEAD WITH & W/O-POST PROCESS   Final Result      CT angiogram of the [place] of [place] within normal limits.      CT-HEAD W/O   Final Result         NO ACUTE ABNORMALITIES ARE NOTED ON CT SCAN OF THE HEAD.      Findings are consistent with atrophy.  Decreased attenuation in the periventricular white matter likely indicates microvascular ischemic disease.      DX-CHEST-PORTABLE (1 VIEW)    (Results Pending)

## 2020-11-02 NOTE — ASSESSMENT & PLAN NOTE
"Presented with \"gobbled\" speech, which started around 9:30 this morning associated with headache, dizziness  All these symptoms spontaneously resolved  H/o TIA    Neurology consulted, recommendations appreciated    HbA1C 5.5    Cardiac monitoring  PT/OT/SLP  Aspirin  LDL 43    CT head without contrast no acute abnormalities  MRI brain negative for acute pathology  Permissive HTN  TTE with EF of 55%  CTA head and neck within normal limits.  No significant flow-limiting stenosis  Cardiac monitoring  "

## 2020-11-02 NOTE — H&P
"Hospital Medicine History & Physical Note    Date of Service  11/2/2020    Primary Care Physician  Romaine Somers M.D.    Consultants  Neurology    Code Status  Full Code    Chief Complaint  Chief Complaint   Patient presents with   • Possible Stroke       History of Presenting Illness  80 y.o. male with h/o CAD, s/p CABG and mitral valve repair, TIA, cataract, glaucoma, headaches presented 11/2/2020 with \"gobbled\" speech, which started around 9:30 this morning associated with headache, dizziness. Patient denied any weakness, facial asymetry, fever, chills, nausea, vomiting, chest pain, SOB, cough, diarrhea, constipation.     CT angiogram of the [place] within normal limits.  CT head showed atrophy, microvascular ischemic changes.   CTA showed mild atherosclerotic plaque at the carotid bifurcation bilaterally, no significant flow-limiting stenosis; no evidence of carotid or vertebral occlusion or dissection.  CXR demonstrated linear bibasilar atelectasis.      Review of Systems  Review of Systems   Constitutional: Negative for chills and fever.   HENT: Negative for congestion and sinus pain.    Eyes: Negative for blurred vision and double vision.   Respiratory: Negative for cough and shortness of breath.    Cardiovascular: Negative for chest pain, palpitations and leg swelling.   Gastrointestinal: Negative for abdominal pain, nausea and vomiting.   Genitourinary: Negative for dysuria.   Musculoskeletal: Negative for myalgias.   Neurological: Positive for speech change and headaches. Negative for focal weakness.   Psychiatric/Behavioral: Negative for depression.       Past Medical History   has a past medical history of Arthritis, Bowel habit changes, Cataract, Coronary artery disease, Disorder of thyroid, Glaucoma, Headache(784.0), Heart burn, Heart valve disease, Indigestion, Migraine, Muscle disorder, Psychiatric problem, and Stroke (HCC) (02/17/2017).    Surgical History   has a past surgical history that " includes turp-vapor; cataract phaco with iol; other cardiac surgery (); other neurological surg (); knee arthroplasty total; tibia orif; open reduction; knee arthroplasty total (Left, 2019); other orthopedic surgery (); other orthopedic surgery (); pr repair of nasal septum (N/A, 2020); repair or reconstruction, nasal valve (N/A, 2020); and turbinate reduction (Bilateral, 2020).     Family History  family history includes Alcohol/Drug in his brother, father, maternal aunt, and maternal uncle; Arthritis in his brother and mother; Cancer in his father; Genetic Disorder in his brother, maternal grandmother, mother, paternal grandmother, and paternal uncle; Psychiatric Illness in his paternal grandmother; Stroke in his maternal grandfather and maternal uncle.     Social History   reports that he quit smoking about 50 years ago. His smoking use included cigarettes and pipe. He smoked 1.00 pack per day. He has never used smokeless tobacco. He reports current alcohol use of about 0.5 oz of alcohol per week. He reports that he does not use drugs.    Allergies  Allergies   Allergen Reactions   • Celebrex [Celecoxib]      Stomach ache   • Vioxx [Rofecoxib]      Stomach ache       Medications  Prior to Admission Medications   Prescriptions Last Dose Informant Patient Reported? Taking?   Biotin 2500 MCG Cap 2020 at 0800 Patient Yes No   Sig: Take 1 Cap by mouth every day.   Brinzolamide-Brimonidine (SIMBRINZA) 1-0.2 % Suspension 2020 at 0800 Patient Yes No   Si Drop by Ophthalmic route 2 Times a Day.   Cholecalciferol (VITAMIN D3) 2000 UNIT Cap 2020 at 0800 Patient Yes No   Sig: Take 1 Cap by mouth every day.   Cyanocobalamin (VITAMIN B12 PO) 2020 at 0800 Patient Yes No   Sig: Take 1 Tab by mouth every day.   Omega-3 Fatty Acids (FISH OIL) 1000 MG Cap capsule Not Taking at Unknown time  No No   Sig: Take 1 Cap by mouth every day.   Patient not taking: Reported on  11/2/2020   Testosterone (ANDROGEL) 25 MG/2.5GM GEL unknown at Unknown time Patient Yes No   Sig: Apply 2.5 g to skin as directed every day.   aspirin EC (ECOTRIN) 81 MG Tablet Delayed Response 11/2/2020 at 0800  No No   Sig: Take 1 Tab by mouth every day.   atorvastatin (LIPITOR) 10 MG Tab 11/1/2020 at 2000 Patient Yes No   Sig: Take 10 mg by mouth every evening.   escitalopram (LEXAPRO) 10 MG Tab 11/1/2020 at 2200  Yes Yes   Sig: Take 10 mg by mouth every bedtime.   finasteride (PROSCAR) 5 MG Tab 11/1/2020 at 1200 Patient Yes No   Sig: Take 5 mg by mouth every day.   levothyroxine (SYNTHROID) 25 MCG Tab 11/2/2020 at 0700 Patient Yes No   Sig: Take 25 mcg by mouth Every morning on an empty stomach.   tadalafil (CIALIS) 20 MG tablet unknown at Unknown time Patient Yes No   Sig: Take 20 mg by mouth as needed for Erectile Dysfunction.   tamsulosin (FLOMAX) 0.4 MG capsule 11/1/2020 at 2200 Patient Yes No   Sig: Take 0.4 mg by mouth See Admin Instructions. 4 times a week at night on Sunday, Tuesday, Thursday, and Saturday   timolol (TIMOPTIC) 0.5 % Solution 11/2/2020 at 0800  Yes Yes   Sig: Place 1 Drop in both eyes 2 times a day.      Facility-Administered Medications: None       Physical Exam  Temp:  [36.7 °C (98.1 °F)] 36.7 °C (98.1 °F)  Pulse:  [66-77] 67  Resp:  [14-32] 18  BP: (134-158)/() 134/107  SpO2:  [85 %-98 %] 97 %    Physical Exam  Vitals signs and nursing note reviewed.   Constitutional:       Appearance: Normal appearance.   HENT:      Head: Normocephalic and atraumatic.      Nose: Nose normal.      Mouth/Throat:      Mouth: Mucous membranes are moist.      Pharynx: Oropharynx is clear.   Eyes:      Extraocular Movements: Extraocular movements intact.      Conjunctiva/sclera: Conjunctivae normal.      Pupils: Pupils are equal, round, and reactive to light.   Neck:      Musculoskeletal: Normal range of motion. No neck rigidity.   Cardiovascular:      Rate and Rhythm: Normal rate and regular rhythm.       Pulses: Normal pulses.      Heart sounds: Normal heart sounds. No murmur.   Pulmonary:      Effort: Pulmonary effort is normal. No respiratory distress.      Breath sounds: Normal breath sounds.   Abdominal:      General: Abdomen is flat. Bowel sounds are normal.      Tenderness: There is no abdominal tenderness.   Musculoskeletal: Normal range of motion.         General: No swelling or tenderness.   Skin:     General: Skin is warm.   Neurological:      General: No focal deficit present.      Mental Status: He is alert and oriented to person, place, and time.      Cranial Nerves: No cranial nerve deficit.      Sensory: No sensory deficit.      Motor: No weakness.         Laboratory:  Recent Labs     11/02/20  1258   WBC 9.6   RBC 5.25   HEMOGLOBIN 16.3   HEMATOCRIT 49.5   MCV 94.3   MCH 31.0   MCHC 32.9*   RDW 46.1   PLATELETCT 167   MPV 9.8     Recent Labs     11/02/20  1258   SODIUM 139   POTASSIUM 4.0   CHLORIDE 104   CO2 28   GLUCOSE 96   BUN 12   CREATININE 0.82   CALCIUM 8.8     Recent Labs     11/02/20  1258   ALTSGPT 16   ASTSGOT 11*   ALKPHOSPHAT 93   TBILIRUBIN 0.7   GLUCOSE 96     Recent Labs     11/02/20  1258   APTT 27.2   INR 1.01     No results for input(s): NTPROBNP in the last 72 hours.      Recent Labs     11/02/20  1258   TROPONINT 7       Imaging:  DX-CHEST-PORTABLE (1 VIEW)   Final Result      Linear bibasilar atelectasis.      CT-CTA NECK WITH & W/O-POST PROCESSING   Final Result      1.  Mild atherosclerotic plaque at the carotid bifurcations bilaterally. No significant flow-limiting stenosis.      2.  No evidence of carotid or vertebral occlusion or dissection.      CT-CTA HEAD WITH & W/O-POST PROCESS   Final Result      CT angiogram of the [place] within normal limits.      CT-HEAD W/O   Final Result         NO ACUTE ABNORMALITIES ARE NOTED ON CT SCAN OF THE HEAD.      Findings are consistent with atrophy.  Decreased attenuation in the periventricular white matter likely  "indicates microvascular ischemic disease.      EC-ECHOCARDIOGRAM COMPLETE W/O CONT    (Results Pending)   MR-BRAIN-W/O    (Results Pending)         Assessment/Plan:  I anticipate this patient is appropriate for observation status at this time.    * TIA (transient ischemic attack)  Assessment & Plan  Presented with \"gobbled\" speech, which started around 9:30 this morning associated with headache, dizziness  All these symptoms spontaneously resolved  H/o TIA  CT angiogram of the [place] [facility] [place] within normal limits  CT head showed atrophy, microvascular ischemic changes  CTA showed mild atherosclerotic plaque at the carotid bifurcation bilaterally, no significant flow-limiting stenosis; no evidence of carotid or vertebral occlusion or dissection  Neurology consulted, recommendations appreciated  Admit to hospital for further workup.  Neuro checks  Permissive HTN  MRI brain wo  Lipid profile  HbA1C  TTE  Cardiac monitoring  PT/OT/SLP  Aspirin  Statin    BPH (benign prostatic hyperplasia)  Assessment & Plan  Continue home finasteride, tamsulosin    Glaucoma  Assessment & Plan  Continue home simbinza, timolol eye drops    Hypothyroidism  Assessment & Plan  Continue levothyroxine  Check TSH    "

## 2020-11-02 NOTE — CONSULTS
"[facility] Neurology Stroke Consult:    Referring Physician: Branden Chen M.D.    Reason for consultation: Possible stroke    Last Known Well: 12pm    TPA Decision: No TPA due to NIHSS of 0.    HPI: Jett Nava is a 80 y.o. male with history of arthritis, coronary artery disease status post CABG, migraines and previous history of stroke presenting to the hospital for possible stroke and consulted for stroke.  Patient was having coffee at noon while he was trying to fix the TV when he noticed that he had approximately 10 minutes of \"word salad\".  The symptoms resolved and then the patient had a headache that was in the center of his head.  He states that he has headaches typically throughout his life which he describes as constant with associated photophobia but no phonophobia or nausea.  He did not have any other associated symptoms such as facial drooping, weakness, numbness, vision loss, double vision, nausea or vomiting.  At the time of my evaluation symptoms had resolved.    ROS:     As above. All other systems reviewed and are negative.    Past Medical History:    has a past medical history of Arthritis, Bowel habit changes, Cataract, Coronary artery disease, Disorder of thyroid, Glaucoma, Headache(784.0), Heart burn, Heart valve disease, Indigestion, Migraine, Muscle disorder, Psychiatric problem, and Stroke ([facility]) (02/17/2017).    FHx:  family history includes Alcohol/Drug in his brother, father, maternal aunt, and maternal uncle; Arthritis in his brother and mother; Cancer in his father; Genetic Disorder in his brother, maternal grandmother, mother, paternal grandmother, and paternal uncle; Psychiatric Illness in his paternal grandmother; Stroke in his maternal grandfather and maternal uncle.    SHx:   reports that he quit smoking about 50 years ago. His smoking use included cigarettes and pipe. He smoked 1.00 pack per day. He has never used smokeless tobacco. He reports current alcohol use of " about 0.5 oz of alcohol per week. He reports that he does not use drugs.    Allergies:  Allergies   Allergen Reactions   • Celebrex [Celecoxib]      Stomach ache   • Vioxx [Rofecoxib]      Stomach ache       Medications:    Current Facility-Administered Medications:   •  [COMPLETED] iohexol (OMNIPAQUE) 350 mg/mL, 80 mL, Intravenous, Once, Branden Chen M.D., 80 mL at 11/02/20 1330    Current Outpatient Medications:   •  Omega-3 Fatty Acids (FISH OIL) 1000 MG Cap capsule, Take 1 Cap by mouth every day., Disp: 60 Cap, Rfl: 0  •  aspirin EC (ECOTRIN) 81 MG Tablet Delayed Response, Take 1 Tab by mouth every day., Disp: 90 Tab, Rfl: 0  •  finasteride (PROSCAR) 5 MG Tab, Take 5 mg by mouth every day., Disp: , Rfl:   •  tamsulosin (FLOMAX) 0.4 MG capsule, Take 0.4 mg by mouth ONE-HALF HOUR AFTER BREAKFAST., Disp: , Rfl:   •  Cyanocobalamin (VITAMIN B12 PO), Take 1 Tab by mouth every day., Disp: , Rfl:   •  Calcium Glycerophosphate (PRELIEF PO), Take  by mouth as needed., Disp: , Rfl:   •  simethicone (MYLICON) 80 MG Chew Tab, Take 80 mg by mouth as needed for Flatulence., Disp: , Rfl:   •  levothyroxine (SYNTHROID) 25 MCG Tab, Take 25 mcg by mouth Every morning on an empty stomach., Disp: , Rfl:   •  atorvastatin (LIPITOR) 10 MG Tab, Take 10 mg by mouth every evening., Disp: , Rfl:   •  tadalafil (CIALIS) 20 MG tablet, Take 20 mg by mouth as needed for Erectile Dysfunction., Disp: , Rfl:   •  acetaminophen (TYLENOL) 500 MG Tab, Take 1,000 mg by mouth every 6 hours as needed., Disp: , Rfl:   •  Psyllium (METAMUCIL FIBER PO), Take 2 Caps by mouth 2 Times a Day., Disp: , Rfl:   •  Probiotic Product (ALIGN) 4 MG Cap, Take 1 Cap by mouth every day., Disp: , Rfl:   •  Biotin 2500 MCG Cap, Take 1 Cap by mouth every day., Disp: , Rfl:   •  Cholecalciferol (VITAMIN D3) 2000 UNIT Cap, Take 1 Cap by mouth every day., Disp: , Rfl:   •  dorzolamide-timolol (COSOPT) 22.3-6.8 MG/ML Solution, Place 1 Drop in both eyes every day.,  Disp: , Rfl:   •  Brinzolamide-Brimonidine (SIMBRINZA) 1-0.2 % Suspension, 1 Drop by Ophthalmic route 2 Times a Day., Disp: , Rfl:   •  escitalopram (LEXAPRO) 5 MG tablet, Take 5 mg by mouth every day., Disp: , Rfl:   •  Testosterone (ANDROGEL) 25 MG/2.5GM GEL, Apply 2.5 g to skin as directed every day., Disp: , Rfl:     Vitals:   There were no vitals filed for this visit.    Labs:  Lab Results   Component Value Date/Time    PROTHROMBTM 19.8 (H) 02/05/2018 10:13 AM    INR 1.72 (H) 02/05/2018 10:13 AM      Lab Results   Component Value Date/Time    WBC 9.6 11/02/2020 12:58 PM    RBC 5.25 11/02/2020 12:58 PM    HEMOGLOBIN 16.3 11/02/2020 12:58 PM    HEMATOCRIT 49.5 11/02/2020 12:58 PM    MCV 94.3 11/02/2020 12:58 PM    MCH 31.0 11/02/2020 12:58 PM    MCHC 32.9 (L) 11/02/2020 12:58 PM    MPV 9.8 11/02/2020 12:58 PM    NEUTSPOLYS 72.70 (H) 11/02/2020 12:58 PM    LYMPHOCYTES 13.70 (L) 11/02/2020 12:58 PM    MONOCYTES 9.20 11/02/2020 12:58 PM    EOSINOPHILS 3.10 11/02/2020 12:58 PM    BASOPHILS 0.70 11/02/2020 12:58 PM      Lab Results   Component Value Date/Time    SODIUM 138 06/03/2020 09:20 AM    POTASSIUM 4.6 06/03/2020 09:20 AM    CHLORIDE 102 06/03/2020 09:20 AM    CO2 27 06/03/2020 09:20 AM    GLUCOSE 94 06/03/2020 09:20 AM    BUN 13 06/03/2020 09:20 AM    CREATININE 0.94 06/03/2020 09:20 AM    CREATININE 0.9 04/27/2006 12:10 PM      Lab Results   Component Value Date/Time    CHOLSTRLTOT 99 (L) 03/17/2018 08:21 AM    LDL 38 03/17/2018 08:21 AM    HDL 51 03/17/2018 08:21 AM    TRIGLYCERIDE 52 03/17/2018 08:21 AM       Lab Results   Component Value Date/Time    ALKPHOSPHAT 101 (H) 04/02/2019 07:30 PM    ASTSGOT 17 04/02/2019 07:30 PM    ALTSGPT 11 04/02/2019 07:30 PM    TBILIRUBIN 0.9 04/02/2019 07:30 PM        Imaging:  CT head without contrast was personally reviewed without evidence of acute infarct or hemorrhage.    CTA of the head and neck reviewed in chart.    Physical Exam:     General: 80-year-old male in bed  in no acute distress.  Cardio: Normal S1/S2. No peripheral edema.   Pulm: CTAX2. No respiratory distress.   Skin: Warm, dry, no rashes or lesions   Psychiatric: Appropriate affect. No active psychosis.  HEENT: Atraumatic head, normal sclera and conjunctiva, moist oral mucosa. No lid lag.  Abdomen: Soft, non tender. No masses or hepatosplenomegaly.    Physical Exam:    NIH Stroke Scale:    1a. Level of Consciousness (Alert, drowsy, etc): 0= Alert    1b. LOC Questions (Month, age): 0= Answers both correctly    1c. LOC Commands (Open/close eyes make fist/let go): 0= Obeys both correctly    2.   Best Gaze (Eyes open - patient follows examiner's finger on face): 0= Normal    3.   Visual Fields (introduce visual stimulus/threat to patient's field quadrants): 0= No visual loss  4.   Facial Paresis (Show teeth, raise eyebrows and squeeze eyes shut): 0= Normal     5a. Motor Arm - Left (Elevate arm to 90 degrees if patient is sitting, 45 degrees if  supine): 0= No drift    5b. Motor Arm - Right (Elevate arm to 90 degrees if patient is sitting, 45 degrees if supine): 0= No drift    6a. Motor Leg - Left (Elevate leg 30 degrees with patient supine): 0= No drift    6b. Motor Leg - Right  (Elevate leg 30 degrees with patient supine): 0= No drift    7.   Limb Ataxia (Finger-nose, heel down shin): 0= No ataxia    8.   Sensory (Pin prick to face, arm, trunk and leg - compare side to side): 0= Normal    9.  Best Language (Name item, describe a picture and read sentences): 0= No aphasia    10. Dysarthria (Evaluate speech clarity by patient repeating listed words): 0= Normal articulation    11. Extinction and Inattention (Use information from prior testing to identify neglect or  double simultaneous stimuli testing): 0= No neglect    Total NIH Score: 0    Assessment/Plan:    Jett Nava is a 80 y.o. male with history of arthritis, coronary artery disease status post CABG, migraines and previous history of stroke presenting to  the hospital for possible stroke and consulted for stroke.  At this time, it is difficult to say with certainty that the patient's symptoms are vascular in origin.  Given that the patient appears to have a history of migraines, this could certainly be a complicated migraine however given his age, and given that this is the first time that he has had focal symptoms with a headache this will be evaluated further and treated as if it was a TIA from a vascular risk minimization standpoint.    Plan:   -Admit to hospital for further workup.  -Neuro checks/vital signs per protocol.  -Permissive HTN  -MRI Brain W/O CST  -Obtain CBC/CMP/TSH/LDL/Hgb A1C  -Obtain echocardiogram w/ bubble study  -Initiate smoking cessation/stroke education.  -Schedule evaluation with PT/OT/ST  -ASA 81mg PO Daily.   -Plan discussed with consulting physician and patient's nurse      Corwin Kruger M.D., Diplomat of the American Board of Psychiatry and Neurology  Diplomat of [facility]   Assistant Clinical Professor, [facility] Neurology Consultant

## 2020-11-03 VITALS
OXYGEN SATURATION: 96 % | HEART RATE: 74 BPM | SYSTOLIC BLOOD PRESSURE: 135 MMHG | DIASTOLIC BLOOD PRESSURE: 87 MMHG | BODY MASS INDEX: 26.57 KG/M2 | WEIGHT: 189.82 LBS | RESPIRATION RATE: 16 BRPM | TEMPERATURE: 98.2 F | HEIGHT: 71 IN

## 2020-11-03 PROBLEM — G45.9 TIA (TRANSIENT ISCHEMIC ATTACK): Status: RESOLVED | Noted: 2020-11-02 | Resolved: 2020-11-03

## 2020-11-03 LAB
ALBUMIN SERPL BCP-MCNC: 3.9 G/DL (ref 3.2–4.9)
ALBUMIN/GLOB SERPL: 1.4 G/DL
ALP SERPL-CCNC: 88 U/L (ref 30–99)
ALT SERPL-CCNC: 13 U/L (ref 2–50)
ANION GAP SERPL CALC-SCNC: 8 MMOL/L (ref 7–16)
AST SERPL-CCNC: 13 U/L (ref 12–45)
BILIRUB SERPL-MCNC: 1 MG/DL (ref 0.1–1.5)
BUN SERPL-MCNC: 12 MG/DL (ref 8–22)
CALCIUM SERPL-MCNC: 8.8 MG/DL (ref 8.5–10.5)
CHLORIDE SERPL-SCNC: 101 MMOL/L (ref 96–112)
CHOLEST SERPL-MCNC: 109 MG/DL (ref 100–199)
CO2 SERPL-SCNC: 24 MMOL/L (ref 20–33)
CREAT SERPL-MCNC: 0.6 MG/DL (ref 0.5–1.4)
ERYTHROCYTE [DISTWIDTH] IN BLOOD BY AUTOMATED COUNT: 46.2 FL (ref 35.9–50)
GLOBULIN SER CALC-MCNC: 2.7 G/DL (ref 1.9–3.5)
GLUCOSE SERPL-MCNC: 90 MG/DL (ref 65–99)
HCT VFR BLD AUTO: 51.9 % (ref 42–52)
HDLC SERPL-MCNC: 54 MG/DL
HGB BLD-MCNC: 17.3 G/DL (ref 14–18)
LDLC SERPL CALC-MCNC: 43 MG/DL
MCH RBC QN AUTO: 31.3 PG (ref 27–33)
MCHC RBC AUTO-ENTMCNC: 33.3 G/DL (ref 33.7–35.3)
MCV RBC AUTO: 94 FL (ref 81.4–97.8)
PLATELET # BLD AUTO: 147 K/UL (ref 164–446)
PMV BLD AUTO: 10 FL (ref 9–12.9)
POTASSIUM SERPL-SCNC: 4.1 MMOL/L (ref 3.6–5.5)
PROT SERPL-MCNC: 6.6 G/DL (ref 6–8.2)
RBC # BLD AUTO: 5.52 M/UL (ref 4.7–6.1)
SODIUM SERPL-SCNC: 133 MMOL/L (ref 135–145)
TRIGL SERPL-MCNC: 59 MG/DL (ref 0–149)
WBC # BLD AUTO: 7.1 K/UL (ref 4.8–10.8)

## 2020-11-03 PROCEDURE — 99213 OFFICE O/P EST LOW 20 MIN: CPT | Performed by: NURSE PRACTITIONER

## 2020-11-03 PROCEDURE — G0378 HOSPITAL OBSERVATION PER HR: HCPCS

## 2020-11-03 PROCEDURE — 85027 COMPLETE CBC AUTOMATED: CPT

## 2020-11-03 PROCEDURE — 97161 PT EVAL LOW COMPLEX 20 MIN: CPT

## 2020-11-03 PROCEDURE — 700102 HCHG RX REV CODE 250 W/ 637 OVERRIDE(OP): Performed by: STUDENT IN AN ORGANIZED HEALTH CARE EDUCATION/TRAINING PROGRAM

## 2020-11-03 PROCEDURE — 97165 OT EVAL LOW COMPLEX 30 MIN: CPT

## 2020-11-03 PROCEDURE — 80061 LIPID PANEL: CPT

## 2020-11-03 PROCEDURE — 99217 PR OBSERVATION CARE DISCHARGE: CPT | Performed by: STUDENT IN AN ORGANIZED HEALTH CARE EDUCATION/TRAINING PROGRAM

## 2020-11-03 PROCEDURE — 80053 COMPREHEN METABOLIC PANEL: CPT

## 2020-11-03 PROCEDURE — A9270 NON-COVERED ITEM OR SERVICE: HCPCS | Performed by: STUDENT IN AN ORGANIZED HEALTH CARE EDUCATION/TRAINING PROGRAM

## 2020-11-03 PROCEDURE — 92610 EVALUATE SWALLOWING FUNCTION: CPT

## 2020-11-03 PROCEDURE — 36415 COLL VENOUS BLD VENIPUNCTURE: CPT

## 2020-11-03 RX ADMIN — ASPIRIN 81 MG: 81 TABLET, COATED ORAL at 05:28

## 2020-11-03 ASSESSMENT — COGNITIVE AND FUNCTIONAL STATUS - GENERAL
PERSONAL GROOMING: A LITTLE
HELP NEEDED FOR BATHING: A LITTLE
DRESSING REGULAR UPPER BODY CLOTHING: A LITTLE
MOBILITY SCORE: 24
DAILY ACTIVITIY SCORE: 19
SUGGESTED CMS G CODE MODIFIER MOBILITY: CH
SUGGESTED CMS G CODE MODIFIER DAILY ACTIVITY: CK
TOILETING: A LITTLE
DRESSING REGULAR LOWER BODY CLOTHING: A LITTLE

## 2020-11-03 ASSESSMENT — GAIT ASSESSMENTS
DISTANCE (FEET): 1000
GAIT LEVEL OF ASSIST: SUPERVISED
DEVIATION: BRADYKINETIC

## 2020-11-03 ASSESSMENT — ACTIVITIES OF DAILY LIVING (ADL): TOILETING: INDEPENDENT

## 2020-11-03 NOTE — PROGRESS NOTES
"Pt refused afternoon eye drops from hospital supply he stated, \"my wife is going to bring my home medication, I dont want to take the hospitals because they over charge.\" RN asked pt if he would notify us when his wife brings them in and pt stated yes.   "

## 2020-11-03 NOTE — CARE PLAN
Problem: Knowledge Deficit:  Goal: Knowledge of disease process/condition, treatment plan, diagnostic tests, and medications will improve  Outcome: PROGRESSING AS EXPECTED  Note: Pt understands Dx, asks appropriate questions and actively participates in care     Problem: Communication:  Goal: The ability to communicate needs accurately and effectively will improve  Outcome: PROGRESSING AS EXPECTED  Note: Pt effectively communicates needs, hourly rounding in place

## 2020-11-03 NOTE — PROGRESS NOTES
Pt refusing all medications at this time, besides tylenol and aspirin. Pt states that he does not want to pay for medications in the hospital that he already takes at home. Education provided about medications being refused. Will pass on to day shift RN.

## 2020-11-03 NOTE — DISCHARGE PLANNING
[facility] Rehabilitation Transitional Care Coordination     Referral from:  Dr. Kessler    Facesheet indicates: MCR/AARP    Potential Rehab Diagnosis: None    Chart review indicates patient may have on going medical management and may have therapy needs to possibly meet inpatient rehab facility criteria with the goal of returning to community.    D/C support: YBD     Physiatry consultation denied per protocol.      MRI is negative for an acute event.  No acute TX need.  Anticipate home with spouse once medically cleared.  Physiatry consultation denied per protocol. TCC will not follow.  Please reach out to myself @ [phone] with any questions.    Thank you for the referral.

## 2020-11-03 NOTE — DISCHARGE INSTRUCTIONS
Discharge Instructions    Discharged to home by car with relative. Discharged via wheelchair, hospital escort: Yes.  Special equipment needed: Not Applicable    Be sure to schedule a follow-up appointment with your primary care doctor or any specialists as instructed.     Discharge Plan:   Diet Plan: Discussed  Activity Level: Discussed  Confirmed Follow up Appointment: Patient to Call and Schedule Appointment  Confirmed Symptoms Management: Discussed  Medication Reconciliation Updated: Yes  Influenza Vaccine Indication: Patient Refuses    I understand that a diet low in cholesterol, fat, and sodium is recommended for good health. Unless I have been given specific instructions below for another diet, I accept this instruction as my diet prescription.   Other diet: Regular healthy diet    Special Instructions:  Follow-up with the stroke outpatient in 3 to 4 weeks  Follow-up with your PCP in 2 weeks  Continue current home medication    Stroke/CVA/TIA/Hemorrhagic Ischemia Discharge Instructions  You have had a stroke. Your risk factors have been identified as follows:  Age - Over 55  High blood pressure  High Cholesterol and lipids  It is important that you reduce your risk factors to avoid another stroke in the future. Here are some general guidelines to follow:  · Eat healthy - avoid food high in fat.  · Get regular exercise.  · Maintain a healthy weight.  · Avoid smoking.  · Avoid alcohol and illegal drug use.  · Take your medications as directed.  For more information regarding risk factors, refer to pages 17-19 in your Stroke Patient Education Guide. Stroke Education Guide was given to patient.    Warning signs of a stroke include (which can also be found on page 3 of your Stroke Patient Education Guide):  · Sudden numbness of weakness of the face, arm or leg (especially on one side of the body).  · Sudden confusion, trouble speaking or understanding.  · Sudden trouble seeing in one or both eyes.  · Sudden trouble  walking, dizziness, loss of balance or coordination.  · Sudden severe headache with no known cause.  It is very important to get treatment quickly when a stroke occurs. If you experience any of the above warning signs, call [phone] immediately.     Some patients who have had a stroke will be going home on a blood thinner medication called Warfarin (Coumadin).  This medication requires very close monitoring and follow up.  This follow up can be provided by either your Primary Care Physician or by [facility] Outpatient Anticoagulation Service.  The Outpatient Anticoagulation Service is located at the [place] [facility] at [facility] ([facility]).  If you do not know when your follow up appointment is scheduled, call [phone] to verify your appointment time.      · Is patient discharged on Warfarin / Coumadin?   No         Depression / Suicide Risk    As you are discharged from this [facility], it is important to learn how to keep safe from harming yourself.    Recognize the warning signs:  · Abrupt changes in personality, positive or negative- including increase in energy   · Giving away possessions  · Change in eating patterns- significant weight changes-  positive or negative  · Change in sleeping patterns- unable to sleep or sleeping all the time   · Unwillingness or inability to communicate  · Depression  · Unusual sadness, discouragement and loneliness  · Talk of wanting to die  · Neglect of personal appearance   · Rebelliousness- reckless behavior  · Withdrawal from people/activities they love  · Confusion- inability to concentrate     If you or a loved one observes any of these behaviors or has concerns about self-harm, here's what you can do:  · Talk about it- your feelings and reasons for harming yourself  · Remove any means that you might use to hurt yourself (examples: pills, rope, extension cords, firearm)  · Get professional help from the community  (Mental Health, Substance Abuse, psychological counseling)  · Do not be alone:Call your Safe Contact- someone whom you trust who will be there for you.  · Call your local CRISIS HOTLINE 014-6128 or 852-616-0593  · Call your local Children's Mobile Crisis Response Team [place] (465) 603-6708 or www.Scrapblog  · Call the toll free National Suicide Prevention Hotlines   · National Suicide Prevention Lifeline 779-407-REPF (0903)  · National Hope Line Network 800-SUICIDE (268-7941)

## 2020-11-03 NOTE — PROGRESS NOTES
Neurology Progress Note  Neurohospitalist Service, [facility]    Referring Physician: Nafisa Centeno M.D.    Chief Complaint   Patient presents with   • Possible Stroke       HPI: Refer to initial documented Neurology H&P, as detailed in the patient's chart.    Interval History 11/3/2020: No acute events overnight. Patient met in the hallway, ambulating without difficulty or need of assistance, awake, alert, following commands, and fully oriented. He states not sleeping well, so feeling a bit tired, but transient word salad remains resolved, headache resolved with PRN Acetaminophen. Patient has no complaints of any neurological deficits at this time.     Past Medical History:   Past Medical History:   Diagnosis Date   • Arthritis     osteo/ fingers hips   • Bowel habit changes     constipation   • Cataract     gianna iol   • Coronary artery disease    • Disorder of thyroid    • Glaucoma    • Headache(784.0)    • Heart burn    • Heart valve disease    • Indigestion    • Migraine    • Muscle disorder    • Psychiatric problem     anxiety   • Stroke ([facility]) 02/17/2017    no weakness/problems post stroke        FHx:  Family History   Problem Relation Age of Onset   • Arthritis Mother    • Genetic Disorder Mother    • Cancer Father    • Alcohol/Drug Father    • Arthritis Brother    • Genetic Disorder Brother    • Alcohol/Drug Brother    • Alcohol/Drug Maternal Aunt    • Stroke Maternal Uncle    • Alcohol/Drug Maternal Uncle    • Genetic Disorder Paternal Uncle    • Genetic Disorder Maternal Grandmother    • Stroke Maternal Grandfather    • Genetic Disorder Paternal Grandmother    • Psychiatric Illness Paternal Grandmother         SHx:  Social History     Socioeconomic History   • Marital status:      Spouse name: Not on file   • Number of children: Not on file   • Years of education: Not on file   • Highest education level: Not on file   Occupational History   • Not on file   Social Needs   • Financial  resource strain: Not on file   • Food insecurity     Worry: Not on file     Inability: Not on file   • Transportation needs     Medical: Not on file     Non-medical: Not on file   Tobacco Use   • Smoking status: Former Smoker     Packs/day: 1.00     Types: Cigarettes, Pipe     Quit date: 1970     Years since quittin.8   • Smokeless tobacco: Never Used   Substance and Sexual Activity   • Alcohol use: Yes     Alcohol/week: 0.5 oz     Types: 1 Glasses of wine per week     Comment: 1 per day   • Drug use: No   • Sexual activity: Not on file   Lifestyle   • Physical activity     Days per week: Not on file     Minutes per session: Not on file   • Stress: Not on file   Relationships   • Social connections     Talks on phone: Not on file     Gets together: Not on file     Attends Christian service: Not on file     Active member of club or organization: Not on file     Attends meetings of clubs or organizations: Not on file     Relationship status: Not on file   • Intimate partner violence     Fear of current or ex partner: Not on file     Emotionally abused: Not on file     Physically abused: Not on file     Forced sexual activity: Not on file   Other Topics Concern   • Not on file   Social History Narrative   • Not on file        Medications:    Current Facility-Administered Medications:   •  aspirin EC (ECOTRIN) tablet 81 mg, 81 mg, Oral, DAILY, Yanet Kessler M.D., 81 mg at 20 0528  •  atorvastatin (LIPITOR) tablet 40 mg, 40 mg, Oral, Nightly, Yanet Kessler M.D.  •  vitamin D (cholecalciferol) tablet 1,000 Units, 1,000 Units, Oral, DAILY, Yanet Kessler M.D.  •  cyanocobalamin (VITAMIN B-12) tablet 100 mcg, 100 mcg, Oral, DAILY, Yanet Kessler M.D.  •  escitalopram (Lexapro) tablet 10 mg, 10 mg, Oral, QHS, Yanet Kessler M.D.  •  finasteride (PROSCAR) tablet 5 mg, 5 mg, Oral, DAILY, Yanet Kessler M.D.  •  levothyroxine (SYNTHROID) tablet 25 mcg, 25 mcg, Oral, AM ES, Yanet  DEBBIE Kessler  •  tamsulosin (FLOMAX) capsule 0.4 mg, 0.4 mg, Oral, See Admin Instructions, Yanet Kessler M.D.  •  timolol (TIMOPTIC) 0.5 % ophthalmic solution 1 Drop, 1 Drop, Both Eyes, BID, Yanet Kessler M.D.  •  Pharmacy consult request - Allow for permissive hypertension: SBP up to 220 mmHg/DBP up to 120 mmHg x 48 hours, , Other, PHARMACY TO DOSE, Yanet Kessler M.D.  •  senna-docusate (PERICOLACE or SENOKOT S) 8.6-50 MG per tablet 2 Tab, 2 Tab, Oral, BID **AND** polyethylene glycol/lytes (MIRALAX) PACKET 1 Packet, 1 Packet, Oral, QDAY PRN **AND** magnesium hydroxide (MILK OF MAGNESIA) suspension 30 mL, 30 mL, Oral, QDAY PRN **AND** bisacodyl (DULCOLAX) suppository 10 mg, 10 mg, Rectal, QDAY PRN, Yanet Kessler M.D.  •  acetaminophen (TYLENOL) tablet 650 mg, 650 mg, Oral, Q6HRS PRN, Yanet Kessler M.D., 650 mg at 11/02/20 2315  •  labetalol (NORMODYNE/TRANDATE) injection 10 mg, 10 mg, Intravenous, Q4HRS PRN, Yanet Kessler M.D.  •  enoxaparin (LOVENOX) inj 40 mg, 40 mg, Subcutaneous, DAILY AT 1800, Yanet Kessler M.D.  •  brimonidine (ALPHAGAN) 0.2 % ophthalmic solution 1 Drop, 1 Drop, Both Eyes, BID **AND** dorzolamide (TRUSOPT) 2 % ophthalmic solution 1 Drop, 1 Drop, Both Eyes, BID, Yanet Kessler M.D.    Allergies:  Allergies   Allergen Reactions   • Celebrex [Celecoxib]      Stomach ache   • Vioxx [Rofecoxib]      Stomach ache        Review of systems: In addition to what is detailed in the HPI and interval history above, all other systems reviewed and are negative.    Physical Examination:   Vitals:    11/02/20 2000 11/02/20 2333 11/03/20 0400 11/03/20 0805   BP: 158/87 135/68 138/85 141/92   Pulse: 96 71 86 74   Resp: 20 16 16 16   Temp: 36.4 °C (97.6 °F) 36.2 °C (97.2 °F) 36.5 °C (97.7 °F) 36.9 °C (98.4 °F)   TempSrc: Temporal Temporal Temporal Temporal   SpO2: 96% 94% 96% 95%   Weight: 86.1 kg (189 lb 13.1 oz)      Height:         General: Patient in no acute distress,  pleasant and cooperative.  HEENT: Normocephalic, no signs of acute trauma.   Neck: Supple, no meningeal signs or carotid bruits. There is normal range of motion. No tenderness on exam.   Chest: Clear to auscultation. No cough.   CV: RRR, no murmurs.   Skin: No signs of acute rashes or trauma.   Musculoskeletal: Joints exhibit full range of motion, without any pain to palpation. There are no signs of joint or muscle swelling. There is no tenderness to deep palpation of muscles.   Psychiatric: No hallucinatory behavior. Denies symptoms of depression or suicidal ideation. Mood and affect appear normal on exam.     NEUROLOGICAL EXAM:   Mental status, orientation: Awake, alert, following commands, and fully oriented.   Speech and language: speech is clear and fluent. The patient is able to name, repeat and comprehend.   Memory: There is intact recollection of recent and remote events.   Cranial nerve exam: Pupils are 3-4 mm bilaterally and equally reactive to light and accommodation. Visual fields are full to field test. There is no nystagmus on primary or secondary gaze. Intact full EOM in all directions of gaze. Face appears symmetric. Sensation in the face is intact to light touch. Uvula is midline. Palate elevates symmetrically. Tongue is midline and without any signs of tongue biting or fasciculations. Sternocleidomastoid muscles exhibit is normal strength bilaterally. Shoulder shrug is intact bilaterally.   Motor exam: Strength is 5/5 in all extremities. Tone is normal. No abnormal movements were seen on exam.   Sensory exam Reveals normal sense of light touch in all extremities.   Deep tendon reflexes:  2+ throughout. Plantar responses are mute. There is no clonus.   Coordination: Shows a normal finger-nose-finger. Normal rapidly alternating movements.   Gait: The patient was able to get up from seated position on first attempt without requiring assistance. Found to be steady when walking. Movements were fluid  with normal arm swing. The patient was able to turn without difficulties or tendency to fall. Romberg examination negative.       Ancillary Data Reviewed:    Labs:  Lab Results   Component Value Date/Time    PROTHROMBTM 13.6 11/02/2020 12:58 PM    INR 1.01 11/02/2020 12:58 PM      Lab Results   Component Value Date/Time    WBC 7.1 11/03/2020 07:25 AM    RBC 5.52 11/03/2020 07:25 AM    HEMOGLOBIN 17.3 11/03/2020 07:25 AM    HEMATOCRIT 51.9 11/03/2020 07:25 AM    MCV 94.0 11/03/2020 07:25 AM    MCH 31.3 11/03/2020 07:25 AM    MCHC 33.3 (L) 11/03/2020 07:25 AM    MPV 10.0 11/03/2020 07:25 AM    NEUTSPOLYS 72.70 (H) 11/02/2020 12:58 PM    LYMPHOCYTES 13.70 (L) 11/02/2020 12:58 PM    MONOCYTES 9.20 11/02/2020 12:58 PM    EOSINOPHILS 3.10 11/02/2020 12:58 PM    BASOPHILS 0.70 11/02/2020 12:58 PM      Lab Results   Component Value Date/Time    SODIUM 133 (L) 11/03/2020 07:25 AM    POTASSIUM 4.1 11/03/2020 07:25 AM    CHLORIDE 101 11/03/2020 07:25 AM    CO2 24 11/03/2020 07:25 AM    GLUCOSE 90 11/03/2020 07:25 AM    BUN 12 11/03/2020 07:25 AM    CREATININE 0.60 11/03/2020 07:25 AM    CREATININE 0.9 04/27/2006 12:10 PM      Lab Results   Component Value Date/Time    CHOLSTRLTOT 109 11/03/2020 07:25 AM    LDL 43 11/03/2020 07:25 AM    HDL 54 11/03/2020 07:25 AM    TRIGLYCERIDE 59 11/03/2020 07:25 AM       Lab Results   Component Value Date/Time    ALKPHOSPHAT 88 11/03/2020 07:25 AM    ASTSGOT 13 11/03/2020 07:25 AM    ALTSGPT 13 11/03/2020 07:25 AM    TBILIRUBIN 1.0 11/03/2020 07:25 AM        Imaging/Testing:    I interpreted and/or reviewed the patient's neuroimaging    MR-BRAIN-W/O   Final Result      1.  No acute abnormality.   2.  Mild cerebral atrophy.   3.  Mild chronic microvascular ischemic disease.      EC-ECHOCARDIOGRAM COMPLETE W/O CONT   Final Result      DX-CHEST-PORTABLE (1 VIEW)   Final Result      Linear bibasilar atelectasis.      CT-CTA NECK WITH & W/O-POST PROCESSING   Final Result      1.  Mild  "atherosclerotic plaque at the carotid bifurcations bilaterally. No significant flow-limiting stenosis.      2.  No evidence of carotid or vertebral occlusion or dissection.      CT-CTA HEAD WITH & W/O-POST PROCESS   Final Result      CT angiogram of the [place] within normal limits.      CT-HEAD W/O   Final Result         NO ACUTE ABNORMALITIES ARE NOTED ON CT SCAN OF THE HEAD.      Findings are consistent with atrophy.  Decreased attenuation in the periventricular white matter likely indicates microvascular ischemic disease.          Assessment:    Jett Nava is a 80 y.o. male with relevant history of prior stroke (2017, no residual deficits), migraine headaches, osteoarthritis of the hands, bilateral cataracts, nonspecific thyroid disorder, and mitral valve repair who presented 11/2/20 for transient \"word salad\" whom neurology was consulted to address possible TIA/CVA. MRI brain wo contrast revealed no acute infarction or intracranial abnormality.TTE with EF 55%, no gross structural abnormalities, and normal left atrial size. Neurological exam remains unremarkable with resolution of headache s/p PRN Acetaminophen, no remission of transient speech difficulty, and no other neurological deficits on exam. NIHSS remains 0. Differiential diagnoses include complicated migraine with late life accompaniment versus TIA.     Plan:    -q4h and PRN neuro assessment. VS per nursing/unit protocol. Permissive HTN ok for 24 hour or until discharge, not to exceed SBP > 220, DBP > 105. Then, outpatient BP goal < 140/90. Antihypertensives per primary team.   -Telemetry; currently SR. Screen for Afib/arrhythmia.    -Continue home ASA 81 mg PO q day.  -Continue Atorvastatin 40 mg PO q HS. Note lipid panel: LDL 43 and HDL 54 (both within goal).   -Continue diet and exercise as previously at home for BG management. BG goal 140-180. Note hemoglobin A1c 5.5 (within goal).   -PT/OT/SLP eval and treat. PT eval pending. OT " and SLP with no further therapy needs.   -Counseled patient at length regarding life style and risk factor modification for secondary stroke prevention.   -[facility]. Referral placed.   -All other medical management per primary team.   -DVT PPX: SCDs.      No further recommendations or further studies from a neurological standpoint at this time. Please re-consult if you have further questions or there is a change in status.      The evaluation of the patient, and recommended management, was discussed with Dr. Mirtha Kruger, Dr. Centeno, and bedside RN. I have performed a physical exam and reviewed and updated ROS and Plan today (11/3/2020). In review of yesterday's note (11/2/2020), there are no changes except as documented above.    Harpreet Santos, LIDIA.P.R.N.   Nurse Practitioner, Neurohospitalist  [facility]  (t) [phone] (f) [phone]

## 2020-11-03 NOTE — DISCHARGE SUMMARY
"Discharge Summary    CHIEF COMPLAINT ON ADMISSION  Chief Complaint   Patient presents with   • Possible Stroke       Reason for Admission  TIA    Admission Date  11/2/2020    CODE STATUS  Full Code    HPI & HOSPITAL COURSE  This is a 80 y.o. male here with past medical history of arthritis, coronary artery disease s/p CABG, migraines, stroke, who presented 11/2/2020 for possible stroke. \" Patient was having coffee at noon while he was trying to fix the TV when he noticed that he had approximately 10 minutes of \"\"gobbled\" speech.  The symptoms resolved and then the patient had a headache that was in the center of his head.  He states that he has headaches typically throughout his life which he describes as constant with associated photophobia but no phonophobia or nausea.  He did not have any other associated symptoms such as facial drooping, weakness, numbness, vision loss, double vision, nausea or vomiting.  At the time of my evaluation symptoms had resolved.\"    During this hospitalization, HbA1C 5.5, LDL 43. Extensive stroke work-up including CT head, CTA head and neck, MRI brain, and echo were unremarkable with no acute pathology. The pt was monitored on telemetry with neuro checks.     Neurology consulted, recommended outpatient follow-up with the stroke clinic.  Continue home dose aspirin and a statin.      Therefore, he is discharged in good and stable condition to home with close outpatient follow-up.    The patient recovered much more quickly than anticipated on admission.    Discharge Date  11/3//2020    FOLLOW UP ITEMS POST DISCHARGE  Follow-up with the stroke outpatient in 3 to 4 weeks  Follow-up with your PCP in 2 weeks  Continue current home medication    DISCHARGE DIAGNOSES  Principal Problem (Resolved):    TIA (transient ischemic attack) POA: Unknown  Active Problems:    Hypothyroidism POA: Unknown    Glaucoma POA: Unknown    BPH (benign prostatic hyperplasia) POA: Unknown      FOLLOW UP  No future " appointments.  Romaine Somers M.D.  [address] [id number] [phone]    In 2 weeks  Follow-up for recent TIA    Follow-up with the stroke outpatient in 3 to 4 weeks    MEDICATIONS ON DISCHARGE     Medication List      CONTINUE taking these medications      Instructions   AndroGel 25 MG/2.5GM (1%) Gel  Generic drug: Testosterone   Apply 2.5 g to skin as directed every day.  Dose: 2.5 g     aspirin EC 81 MG Tbec  Commonly known as: ECOTRIN   Take 1 Tab by mouth every day.  Dose: 81 mg     atorvastatin 10 MG Tabs  Commonly known as: LIPITOR   Take 10 mg by mouth every evening.  Dose: 10 mg     Biotin 2500 MCG Caps   Take 1 Cap by mouth every day.  Dose: 1 Cap     escitalopram 10 MG Tabs  Commonly known as: Lexapro   Take 10 mg by mouth every bedtime.  Dose: 10 mg     finasteride 5 MG Tabs  Commonly known as: PROSCAR   Take 5 mg by mouth every day.  Dose: 5 mg     fish oil 1000 MG Caps capsule   Take 1 Cap by mouth every day.  Dose: 1,000 mg     levothyroxine 25 MCG Tabs  Commonly known as: SYNTHROID   Take 25 mcg by mouth Every morning on an empty stomach.  Dose: 25 mcg     Simbrinza 1-0.2 % Susp  Generic drug: Brinzolamide-Brimonidine   1 Drop by Ophthalmic route 2 Times a Day.  Dose: 1 Drop     tadalafil 20 MG tablet  Commonly known as: CIALIS   Take 20 mg by mouth as needed for Erectile Dysfunction.  Dose: 20 mg     tamsulosin 0.4 MG capsule  Commonly known as: FLOMAX   Take 0.4 mg by mouth See Admin Instructions. 4 times a week at night on Sunday, Tuesday, Thursday, and Saturday  Dose: 0.4 mg     timolol 0.5 % Soln  Commonly known as: TIMOPTIC   Place 1 Drop in both eyes 2 times a day.  Dose: 1 Drop     VITAMIN B12 PO   Take 1 Tab by mouth every day.  Dose: 1 Tab     Vitamin D3 2000 UNIT Caps   Take 1 Cap by mouth every day.  Dose: 1 Cap            Allergies  Allergies   Allergen Reactions   • Celebrex [Celecoxib]      Stomach ache   • Vioxx [Rofecoxib]      Stomach ache       DIET  Orders  Placed This Encounter   Procedures   • Diet Order Diet: Cardiac     Standing Status:   Standing     Number of Occurrences:   1     Order Specific Question:   Diet:     Answer:   Cardiac [6]       ACTIVITY  As tolerated.  Weight bearing as tolerated    CONSULTATIONS  Neurology    PROCEDURES      LABORATORY  Lab Results   Component Value Date    SODIUM 133 (L) 11/03/2020    POTASSIUM 4.1 11/03/2020    CHLORIDE 101 11/03/2020    CO2 24 11/03/2020    GLUCOSE 90 11/03/2020    BUN 12 11/03/2020    CREATININE 0.60 11/03/2020    CREATININE 0.9 04/27/2006        Lab Results   Component Value Date    WBC 7.1 11/03/2020    HEMOGLOBIN 17.3 11/03/2020    HEMATOCRIT 51.9 11/03/2020    PLATELETCT 147 (L) 11/03/2020        Total time of the discharge process exceeds 32 minutes.

## 2020-11-03 NOTE — THERAPY
"Occupational Therapy   Initial Evaluation     Patient Name: Jett Nava  Age:  80 y.o., Sex:  male  Medical Record #: [id number]  Today's Date: 11/3/2020          Assessment  Patient is 80 y.o. male admit with TIA and aphasia. PMH significant for arthritis, CAD s/p CABG, migraines, Hx CVA. Pt completed ADLs and mobility at supervision, and reported no residual deficits aside from \"moving a little slower than usual\". Pt demonstrates no further need for skilled OT services in the acute care setting at this time, and is safe to d/c home with support from wife. Pt educated on shower chair and where to purchase, and pt verbalized understanding.    Plan    Recommend Occupational Therapy for Evaluation only     DC Equipment Recommendations: Tub / Shower Seat  Discharge Recommendations: Anticipate that the patient will have no further occupational therapy needs after discharge from the hospital(defer d/c recs to PT)     Subjective    \"I'm just moving a little slower than usual\"     Objective       11/03/20 0932   Prior Living Situation   Prior Services None   Housing / Facility Independent Living Facility  ([facility])   Steps Into Home 0   Steps In Home   (elevator, pt lives on 3rd floor)   Bathroom Set up Bathtub / Shower Combination;Grab Bars   Equipment Owned None   Lives with - Patient's Self Care Capacity Spouse   Comments Pt reports facility provies meals but his wife sometimes cooks, and the facility provides housekeeping services 1x/week   Prior Level of ADL Function   Self Feeding Independent   Grooming / Hygiene Independent   Bathing Independent   Dressing Independent   Toileting Independent   Prior Level of IADL Function   Medication Management Independent   Laundry Independent   Kitchen Mobility Independent   Finances Independent   Home Management Independent   Shopping Independent   Prior Level Of Mobility Independent Without Device in Community;Independent Without Device in Home   Driving / " "Transportation Driving Independent   Occupation (Pre-Hospital Vocational) Retired Due To Age   Comments per pt report   Cognition    Cognition / Consciousness WDL   Level of Consciousness Alert   Comments pleasant and cooperative, pt reports \"word salad\" has resolved, oriented to place/reason.   Active ROM Upper Body   Active ROM Upper Body  WDL   Strength Upper Body   Upper Body Strength  WDL   Sensation Upper Body   Upper Extremity Sensation  WDL   Coordination Upper Body   Coordination WDL   Balance Assessment   Sitting Balance (Static) Good   Sitting Balance (Dynamic) Fair +   Standing Balance (Static) Fair +   Standing Balance (Dynamic) Fair +   Weight Shift Sitting Good   Weight Shift Standing Good   Comments no AD   Bed Mobility    Supine to Sit Supervised   Sit to Supine   (seated in chair at end of session)   Scooting Supervised   Comments HOB flat, did not use bed rail   ADL Assessment   Grooming Supervision;Standing  (brush teeth)   Bathing   (educated on shower chair for home)   Lower Body Dressing Supervision  (doff/don socks)   Toileting Supervision  (simulated)   Functional Mobility   Sit to Stand Supervised   Bed, Chair, Wheelchair Transfer Supervised   Toilet Transfers Supervised   Transfer Method Stand Step  (no AD)   Mobility bed>toilet>sink>hallways>chair, no AD, supervision   Activity Tolerance   Sitting in Chair setaed in chair at end of session   Sitting Edge of Bed 8 mins total   Standing 6 mins total   Anticipated Discharge Equipment and Recommendations   DC Equipment Recommendations Tub / Shower Seat   Discharge Recommendations Anticipate that the patient will have no further occupational therapy needs after discharge from the hospital  (defer d/c recs to PT)                 "

## 2020-11-03 NOTE — CARE PLAN
Problem: Communication:  Goal: The ability to communicate needs accurately and effectively will improve  Outcome: PROGRESSING AS EXPECTED  Note: Education provided to call when needing assistance. Call light is within reach. Hourly rounding in place. Patient admitted for stroke like symptoms of dysarthria/word salad. These symptoms have resolved.      Problem: Knowledge Deficit:  Goal: Knowledge of disease process/condition, treatment plan, diagnostic tests, and medications will improve  Outcome: PROGRESSING AS EXPECTED  Note: Education provided on plan of care. All questions answered. Call light is within reach.  Stroke handout given/read by patient.

## 2020-11-03 NOTE — THERAPY
"Physical Therapy   Initial Evaluation     Patient Name: Jett Nava  Age:  80 y.o., Sex:  male  Medical Record #: [id number]  Today's Date: 11/3/2020     Precautions: (P) Swallow Precautions ( See Comments)    Assessment  Patient is 80 y.o. male admitted with stroke like symptoms, pt reports he experienced \"word salad\" which has since resolved. Pt requested to work with PT as he had \"questions regarding home exercise routine.\" Pt demonstrated functional mobility and gait at baseline per pt report. Discussed how to modify walking program at home to improve gait speed and activity tolerance. Encouraged pt to follow up with Outpatient to address higher level balance and strength/conditioning needs. Pt with no further acute PT needs, continue to mobilize with nursing staff.     Plan    Recommend Physical Therapy for Evaluation only        Discharge Recommendations: Discussed home health or outpatient PT follow up. Likely more an Outpatient PT candidate         11/03/20 1013   Precautions   Precautions Swallow Precautions ( See Comments)   Comments Upright for all PO, small bites/sips, slow rate   Vitals   O2 Delivery Device None - Room Air   Pain 0 - 10 Group   Therapist Pain Assessment During Activity;Nurse Notified  (no pain reported)   Prior Living Situation   Prior Services None   Housing / Facility Independent Living Facility   Steps Into Home 0   Steps In Home 0   Equipment Owned None   Lives with - Patient's Self Care Capacity Spouse   Prior Level of Functional Mobility   Bed Mobility Independent   Transfer Status Independent   Ambulation Independent   Distance Ambulation (Feet)   (community)   Assistive Devices Used None   Stairs Independent   Comments pt reports he goes to community gym 3 days/wk and walks outside with spouse 3 days/wk   Cognition    Cognition / Consciousness WDL   Level of Consciousness Alert   Comments pleasant and motivated. Reports \"word salad has resolved\" but has questions for PT " regarding current workout routine   Active ROM Lower Body    Active ROM Lower Body  WDL   Strength Lower Body   Lower Body Strength  WDL   Balance Assessment   Sitting Balance (Static) Good   Sitting Balance (Dynamic) Good   Standing Balance (Static) Fair +   Standing Balance (Dynamic) Fair +   Weight Shift Sitting Good   Weight Shift Standing Good   Comments w/o AD   Gait Analysis   Gait Level Of Assist Supervised   Assistive Device None   Distance (Feet) 1000   # of Times Distance was Traveled 1   Deviation Bradykinetic  (slight kyphotic posture)   # of Stairs Climbed 0   Weight Bearing Status no restrictions   Comments slow but overall steady gait.    Bed Mobility    Supine to Sit   (in chair pre/post PT session)   Scooting Supervised   Functional Mobility   Sit to Stand Supervised   Bed, Chair, Wheelchair Transfer Supervised   Transfer Method Stand Step

## 2020-11-03 NOTE — THERAPY
"Speech Language Pathology   Initial Assessment     Patient Name: Jett Nava  AGE:  80 y.o., SEX:  male  Medical Record #: [id number]  Today's Date: 11/3/2020     Precautions  Precautions: (P) Swallow Precautions ( See Comments)  Comments: (P) Upright for all PO, small bites/sips, slow rate    Assessment    Patient is 80 y.o. male with h/o CAD, s/p CABG and mitral valve repair, TIA, cataract, glaucoma, headaches presented 11/2/2020 with \"gobbled\" speech, headache, and dizziness. Patient denied any weakness, facial asymetry, fever, chills, nausea, vomiting, chest pain, SOB, cough, diarrhea, constipation. MRI Findings (11/2/2020): No acute abnormality; Mild cerebral atrophy, Mild chronic microvascular ischemic disease.    Pt was seen on this date for a clinical swallow assessment. Pt denies hx/current symptoms of dysphagia. Pt admitted sometimes coughing with liquids when \"drinking fast\". RN reports no difficulty with swallowing observed since start of care date. Oral Mechanism Exam WFL. PO trials administered consisted of thin liquids via cup, apple sauce, pudding, soft fruit and rohan cracker. Pt self fed all trials. WFL mastication, timely swallow initiation and full oral clearance appreciated with all trials. Pt noted to take large bites and consecutive/large sips, however no overt s/sx aspiration observed with any of the consistencies administered. SLP provided education on safe swallow strategies including sitting upright at 90° for all PO intake, reduce bolus size/rate. Recommend to continue with Regular diet texture (RG7) with thin liquids (TN0). RN informed of results and recommendations.          Plan    Recommend to continue with Regular diet texture (RG7) with thin liquids (TN0).    Recommend Speech Therapy for Evaluation only for the following treatments:  Dysphagia Training.    Discharge Recommendations: (P) Anticipate that the patient will have no further speech therapy needs after discharge " from the hospital      Objective       11/03/20 0944   Pain 0 - 10 Group   Therapist Pain Assessment 0;Nurse Notified;Post Activity Pain Same as Prior to Activity   Prior Living Situation   Prior Services None   Housing / Facility Independent Living Facility   Lives with - Patient's Self Care Capacity Spouse   Oral Motor Eval    Is Patient Able to Complete Oral Motor Eval Yes, Within Normal Limits   Laryngeal Function   Voice Quality Within Functional Limits   Volutional Cough Within Functional Limits   Excursion Upon Swallow Complete   Oral Food Presentation   Single Swallow Thin (0) Within Functional Limits   Serial Swallow Thin (0) Within Functional Limits   Liquidised (3) Within Functional Limits   Pureed (4) Within Functional Limits   Soft & Bite-Sized (6) - (Dysphagia III) Within Functional Limits   Regular (7) Within Functional Limits   Self Feeding Independent   Tracheostomy   Tracheostomy  No   Dysphagia Strategies / Recommendations   Strategies / Interventions Recommended (Yes / No) Yes   Compensatory Strategies Single Sips / Bites  (Slow rate. Pt noted to take large bites/sips)   Dysphagia Rating   Nutritional Liquid Intake Rating Scale Non thickened beverages   Nutritional Food Intake Rating Scale Total oral diet with no restrictions

## 2020-11-03 NOTE — PROGRESS NOTES
Monitor Summary: SR 63-74, NE 0.20, QRS 0.10, QT 0.38, with occasional and frequent PVCs/rare couplets/bigem, per strip from monitor room.

## 2020-12-23 ENCOUNTER — OFFICE VISIT (OUTPATIENT)
Dept: NEUROLOGY | Facility: MEDICAL CENTER | Age: 80
End: 2020-12-23
Attending: NURSE PRACTITIONER
Payer: MEDICARE

## 2020-12-23 VITALS
HEART RATE: 87 BPM | BODY MASS INDEX: 28.46 KG/M2 | RESPIRATION RATE: 16 BRPM | WEIGHT: 203.26 LBS | HEIGHT: 71 IN | TEMPERATURE: 98 F | DIASTOLIC BLOOD PRESSURE: 60 MMHG | SYSTOLIC BLOOD PRESSURE: 110 MMHG | OXYGEN SATURATION: 96 %

## 2020-12-23 DIAGNOSIS — G43.109 COMPLICATED MIGRAINE: ICD-10-CM

## 2020-12-23 DIAGNOSIS — R29.818 TRANSIENT NEUROLOGICAL SYMPTOMS: ICD-10-CM

## 2020-12-23 PROCEDURE — 99201 HCHG OFFICE VISIT-NEW-LEVEL 1: CPT

## 2020-12-23 PROCEDURE — 99212 OFFICE O/P EST SF 10 MIN: CPT | Performed by: NURSE PRACTITIONER

## 2020-12-23 PROCEDURE — 99202 OFFICE O/P NEW SF 15 MIN: CPT | Performed by: NURSE PRACTITIONER

## 2020-12-23 PROCEDURE — 99215 OFFICE O/P EST HI 40 MIN: CPT | Performed by: NURSE PRACTITIONER

## 2020-12-23 RX ORDER — AZELASTINE 1 MG/ML
SPRAY, METERED NASAL
COMMUNITY
End: 2020-12-23

## 2020-12-23 ASSESSMENT — ENCOUNTER SYMPTOMS
BRUISES/BLEEDS EASILY: 0
BLURRED VISION: 0
MYALGIAS: 0
DEPRESSION: 0
HEADACHES: 1
COUGH: 0
FEVER: 0
HEARTBURN: 0
DIZZINESS: 0

## 2020-12-23 ASSESSMENT — FIBROSIS 4 INDEX: FIB4 SCORE: 2.01

## 2020-12-23 ASSESSMENT — PATIENT HEALTH QUESTIONNAIRE - PHQ9: CLINICAL INTERPRETATION OF PHQ2 SCORE: 0

## 2020-12-23 NOTE — PROGRESS NOTES
"Subjective:   HPI  Jett Nava is an 80 y.o. left handed male who presents to [facility] for evaluation of transient neurological symptoms.     He  presented to [facility] on 11/2/2020 with complaints of  garbled speech/word salad.  He was trying to fix the TV around noon that day when he noticed garbled speech that lasted for about 10 minutes.  He also had a headache in the center of his head.  He did not have any other symptoms.  NIHSS 0 on admission.  Blood pressure 154/92 on admission.    In regards to migraines, he has ocular migraines w/o headache once a month or less. He reports that he never had migraines with pain, but reports that he has some \"good headaches\" that are frontal, sometimes on one side or the other, he does experience photophobia with the headache, no phonophobia.  He was in his mid-20s when he got these headache.  He denies any visual auras.  He does have zig-zag lines with the ocular migraines. . The ocular migraines began about 20 years ago.  The painful headaches occur about every 2 months.        He is on 81mg Aspirin and 10mg Atorvastatin.      PMH:  chronic migraine, CAD s/p CABG, AFib?, Hx of near syncope, anxiety    He had some questionable  History of atrial fibrillation in the past, he was even on warfarin at some point.      Social History:  Former smoker-quit in 1970, drinks 1 glass of wine daily, denies illicit drug use.  Family Hx;   Maternal uncle and grandfather with stroke.        Review of Systems   Constitutional: Negative for fever.   HENT: Negative for hearing loss.    Eyes: Negative for blurred vision.   Respiratory: Negative for cough.    Cardiovascular: Negative for chest pain.   Gastrointestinal: Negative for heartburn.   Genitourinary: Negative for dysuria.   Musculoskeletal: Negative for myalgias.   Skin: Negative for rash.   Neurological: Positive for headaches. Negative for dizziness.   Endo/Heme/Allergies: Does not " "bruise/bleed easily.   Psychiatric/Behavioral: Negative for depression.       Past Medical History:   Diagnosis Date   • Arthritis     osteo/ fingers hips   • Bowel habit changes     constipation   • Cataract     gianna iol   • Coronary artery disease    • Disorder of thyroid    • Glaucoma    • Headache(784.0)    • Heart burn    • Heart valve disease    • Indigestion    • Migraine    • Muscle disorder    • Psychiatric problem     anxiety   • Stroke (HCC) 02/17/2017    no weakness/problems post stroke     Current Outpatient Medications on File Prior to Visit   Medication Sig Dispense Refill   • escitalopram (LEXAPRO) 10 MG Tab Take 10 mg by mouth every bedtime.     • timolol (TIMOPTIC) 0.5 % Solution Place 1 Drop in both eyes 2 times a day.     • aspirin EC (ECOTRIN) 81 MG Tablet Delayed Response Take 1 Tab by mouth every day. 90 Tab 0   • finasteride (PROSCAR) 5 MG Tab Take 5 mg by mouth every day.     • Cyanocobalamin (VITAMIN B12 PO) Take 1 Tab by mouth every day.     • levothyroxine (SYNTHROID) 25 MCG Tab Take 25 mcg by mouth Every morning on an empty stomach.     • atorvastatin (LIPITOR) 10 MG Tab Take 10 mg by mouth every evening.     • Biotin 2500 MCG Cap Take 1 Cap by mouth every day.     • Cholecalciferol (VITAMIN D3) 2000 UNIT Cap Take 1 Cap by mouth every day.     • Brinzolamide-Brimonidine (SIMBRINZA) 1-0.2 % Suspension 1 Drop by Ophthalmic route 2 Times a Day.     • Testosterone (ANDROGEL) 25 MG/2.5GM GEL Apply 2.5 g to skin as directed every day.       No current facility-administered medications on file prior to visit.           Objective:     Encounter Vitals  Standard Vitals  Vitals  Blood Pressure : 110/60  Temperature: 36.7 °C (98 °F)  Temp src: Temporal  Pulse: 87  Respiration: 16  Pulse Oximetry: 96 %  Height: 180.3 cm (5' 11\")  Weight: 92.2 kg (203 lb 4.2 oz)  Encounter Vitals  Temperature: 36.7 °C (98 °F)  Temp src: Temporal  Blood Pressure : 110/60  Pulse: 87  Respiration: 16  Pulse Oximetry: 96 " "%  Weight: 92.2 kg (203 lb 4.2 oz)  Height: 180.3 cm (5' 11\")  BMI (Calculated): 28.35          I personally reviewed imaging below and agree with the findings  MRI brain 11/2/2020     1.  No acute abnormality.  2.  Mild cerebral atrophy.  3.  Mild chronic microvascular ischemic disease.    CTA head WNL    CTA neck:    1.  Mild atherosclerotic plaque at the carotid bifurcations bilaterally. No significant flow-limiting stenosis.     2No evidence of carotid or vertebral occlusion or dissection.    TTE:  LVEF 55%, LA size WNL, ELSA 31.    Stroke Labs:  Creat 0.60, LDL 43, A1C 5.5.      Physical Exam    Constitutional:  Alert, no apparent distress,  Psych:   mood and affect WNL  Neuro:  Oriented X 4, speech fluent, naming and memory intact  Muskuloskeletal:  Moves all extremities equally, strength 5/5 bilaterally, no drift  CN II: Visual fields are full to confrontation. Fundoscopic exam is normal with sharp discs and no vascular changes. Pupils are 3 mm and briskly reactive to light.   CN III, IV, VI  EOMs intact, no ptosis  CN V: Facial sensation is intact to pinprick in all 3 divisions bilaterally. Corneal responses are intact.  CN VII: Face is symmetric with normal eye closure and smile.  CN VII: Hearing is normal to rubbing fingers  CN IX, X: Palate elevates symmetrically. Phonation is normal.  CN XI: Head turning and shoulder shrug are intact  CN XII: Tongue is midline with normal movements and no atrophy.                           Sensation to PP equal bilaterally                 No limb ataxia with finger to nose and heel to shin                 Ambulates with steady gait.                 Rhomberg negative                Biceps,brachioradialis, tricep, patellar and ankle reflex all 1+     Cardiovascular:    S1S2, no abnormal rhythm auscultated, no peripheral edema  Neck:                     No carotid bruits noted   Pulmonary:            Respirations easy, lungs clear to auscultation all fields.     Skin:       "               No obvious rashes.    Iniital NIHSS  0       Current NIHSS    1a. LOC: 0  1b. LOC Questions: 0  1c. LOC Commands: 0  2. Best Gaze:0  3. Visual Fields: 0  4. Facial Paresis:0   5a. Motor arm left: 0  5b. Motor arm right: 0  6a. Motor leg left: 0  6b. Motor leg right: 0  7. Sensory: 0  8. Best Language: 0  9. Limb Ataxia: 0  10. Dysarthria: 0  11. Extinction/Inattention: 0    Total Score Current 0           Current mRS 0      Assessment/Plan:     1. Transient neurological symptoms.  It is noted in his history that he had a stroke in the past; however, his MRI is  negative for new or remote infarcts.      His symptoms were likely due to complicated migraine given that it was followed by headache, cannot rule out TIA.   With possible history of atrial fibrillation, I have recommended that he speak to his cardiologist (at [facility]), he should undergo at least a 30 day  Heart monitor, if atrial fibrillation is found, he should be on AC for stroke prevention.       Stroke risk factors include:  Migraine, possible  History of Afib, HTN      Blood pressure goal less than 130/80, currently at goal   Continue Aspirin 81mg PO daily,  discussed side effects, signs of bleeding  LDL goal < 70, current 43 continue Atorva 10mg discussed medication side effects, will need follow up with primary care evaluate liver function and intervals and refill      I have counseled patient on stroke prevention strategies, stroke symptoms and mimics.  Diet and exercise modifications.  We discussed medication side effects and instructions.       2. Complicated migraine    Recommend   Magnesium oxide 400mg  Riboflavin (B2) 400mg  CoQ10 300mg   Take all 3 every night     Follow up  PRN

## 2020-12-23 NOTE — PATIENT INSTRUCTIONS
" I cannot be 100% certain that this was not a TIA.  When the speech is affected by a TIA or a stroke, it is due to a clot that comes from elsewhere in the body.  With your possible history of atrial fibrillation, you should speak to cardiology.  This may need to be re-evaluated with a heart monitor.  If atrial fibrillation is found, I would recommend that you are placed on a strong blood thinner (warfarin, Eliquis, Xarelto) to prevent stroke.    Speech difficulty such as what you experienced can also occur with migraines, considering you had a headache following this--your symptoms may have represented a complicated migraine.          For headache prevention:      Start magnesium oxide 400mg by mouth every night, may take extra dose if needed for headache (over the counter), hold for diarrhea         Start Riboflavin (Vitamin B2) 400mg by mouth every night (over the counter),may turn urine bright yellow         Start COQ 10, take 300mg every night. (over the counter)          Attempt to go to bed and get up at the same time every night           Eat meals on regular basis            Stay hydrated.             Aerobic exercise 30 minutes daily             Avoid aged or smoked foods, avoid processed foods, red wine, aged cheese              Keep headache diary, include foods that you may have eaten.             Avoid overusing over the counter medications:  Do not take more than 500mg acetaminophen (tylenol), more than 4 times weekly, more frequent or larger doses are associated with medication overuse headache.              Transient Ischemic Attack    A transient ischemic attack (TIA) is a \"warning stroke\" that causes stroke-like symptoms that go away quickly. A TIA does not cause lasting damage to the brain. But having a TIA is a sign that you may be at risk for a stroke. Lifestyle changes and medical treatments can help prevent a stroke.  It is important to know the symptoms of a TIA and what to do. Get help right " away, even if your symptoms go away. The symptoms of a TIA are the same as those of a stroke. They can happen fast, and they usually go away within minutes or hours. They can include:  · Weakness or loss of feeling in your face, arm, or leg. This often happens on one side of your body.  · Trouble walking.  · Trouble moving your arms or legs.  · Trouble talking or understanding what people are saying.  · Trouble seeing.  · Seeing two of one object (double vision).  · Feeling dizzy.  · Feeling confused.  · Loss of balance or coordination.  · Feeling sick to your stomach (nauseous) and throwing up (vomiting).  · A very bad headache for no reason.  What increases the risk?  Certain things may make you more likely to have a TIA. Some of these are things that you can change, such as:  · Being very overweight (obese).  · Using products that contain nicotine or tobacco, such as cigarettes and e-cigarettes.  · Taking birth control pills.  · Not being active.  · Drinking too much alcohol.  · Using drugs.  Other risk factors include:  · Having an irregular heartbeat (atrial fibrillation).  · Being  or .  · Having had blood clots, stroke, TIA, or heart attack in the past.  · Being a woman with a history of high blood pressure in pregnancy (preeclampsia).  · Being over the age of 60.  · Being male.  · Having family history of stroke.  · Having the following diseases or conditions:  ? High blood pressure.  ? High cholesterol.  ? Diabetes.  ? Heart disease.  ? Sickle cell disease.  ? Sleep apnea.  ? Migraine headache.  ? Long-term (chronic) diseases that cause soreness and swelling (inflammation).  ? Disorders that affect how your blood clots.  Follow these instructions at home:  Medicines    · Take over-the-counter and prescription medicines only as told by your doctor.  · If you were told to take aspirin or another medicine to thin your blood, take it exactly as told by your doctor.  ? Taking too much of  "the medicine can cause bleeding.  ? Taking too little of the medicine may not work to treat the problem.  Eating and drinking    · Eat 5 or more servings of fruits and vegetables each day.  · Follow instructions from your doctor about your diet. You may need to follow a certain diet to help lower your risk of having a stroke. You may need to:  ? Eat a diet that is low in fat and salt.  ? Eat foods that contain a lot of fiber.  ? Limit the amount of carbohydrates and sugar in your diet.  · Limit alcohol intake to 1 drink a day for nonpregnant women and 2 drinks a day for men. One drink equals 12 oz of beer, 5 oz of wine, or 1½ oz of hard liquor.  General instructions  · Keep a healthy weight.  · Stay active. Try to get at least 30 minutes of activity on all or most days.  · Find out if you have a condition called sleep apnea. Get treatment if needed.  · Do not use any products that contain nicotine or tobacco, such as cigarettes and e-cigarettes. If you need help quitting, ask your doctor.  · Do not abuse drugs.  · Keep all follow-up visits as told by your doctor. This is important.  Get help right away if:  · You have any signs of stroke. \"BE FAST\" is an easy way to remember the main warning signs:  ? B - Balance. Signs are dizziness, sudden trouble walking, or loss of balance.  ? E - Eyes. Signs are trouble seeing or a sudden change in how you see.  ? F - Face. Signs are sudden weakness or loss of feeling of the face, or the face or eyelid drooping on one side.  ? A - Arms. Signs are weakness or loss of feeling in an arm. This happens suddenly and usually on one side of the body.  ? S - Speech. Signs are sudden trouble speaking, slurred speech, or trouble understanding what people say.  ? T - Time. Time to call emergency services. Write down what time symptoms started.  · You have other signs of stroke, such as:  ? A sudden, very bad headache with no known cause.  ? Feeling sick to your stomach " "(nausea).  ? Throwing up (vomiting).  ? Jerky movements that you cannot control (seizure).  These symptoms may be an emergency. Do not wait to see if the symptoms will go away. Get medical help right away. Call your local emergency services (911 in the [place] Do not drive yourself to the hospital.  Summary  · A transient ischemic attack (TIA) is a \"warning stroke\" that causes stroke-like symptoms that go away quickly.  · A TIA is a medical emergency. Get help right away, even if your symptoms go away.  · A TIA does not cause lasting damage to the brain.  · Having a TIA is a sign that you may be at risk for a stroke. Lifestyle changes and medical treatments can help prevent a stroke.  This information is not intended to replace advice given to you by your health care provider. Make sure you discuss any questions you have with your health care provider.  Document Released: 09/26/2009 Document Revised: 09/13/2019 Document Reviewed: 03/21/2018  "[employer] Patient Education © 2020 "[employer] Inc.  Migraine Headache  A migraine headache is a very strong throbbing pain on one side or both sides of your head. This type of headache can also cause other symptoms. It can last from 4 hours to 3 days. Talk with your doctor about what things may bring on (trigger) this condition.  What are the causes?  The exact cause of this condition is not known. This condition may be triggered or caused by:  · Drinking alcohol.  · Smoking.  · Taking medicines, such as:  ? Medicine used to treat chest pain (nitroglycerin).  ? Birth control pills.  ? Estrogen.  ? Some blood pressure medicines.  · Eating or drinking certain products.  · Doing physical activity.  Other things that may trigger a migraine headache include:  · Having a menstrual period.  · Pregnancy.  · Hunger.  · Stress.  · Not getting enough sleep or getting too much sleep.  · Weather changes.  · Tiredness (fatigue).  What increases the risk?  · Being 25-55 years old.  · Being " female.  · Having a family history of migraine headaches.  · Being .  · Having depression or anxiety.  · Being very overweight.  What are the signs or symptoms?  · A throbbing pain. This pain may:  ? Happen in any area of the head, such as on one side or both sides.  ? Make it hard to do daily activities.  ? Get worse with physical activity.  ? Get worse around bright lights or loud noises.  · Other symptoms may include:  ? Feeling sick to your stomach (nauseous).  ? Vomiting.  ? Dizziness.  ? Being sensitive to bright lights, loud noises, or smells.  · Before you get a migraine headache, you may get warning signs (an aura). An aura may include:  ? Seeing flashing lights or having blind spots.  ? Seeing bright spots, halos, or zigzag lines.  ? Having tunnel vision or blurred vision.  ? Having numbness or a tingling feeling.  ? Having trouble talking.  ? Having weak muscles.  · Some people have symptoms after a migraine headache (postdromal phase), such as:  ? Tiredness.  ? Trouble thinking (concentrating).  How is this treated?  · Taking medicines that:  ? Relieve pain.  ? Relieve the feeling of being sick to your stomach.  ? Prevent migraine headaches.  · Treatment may also include:  ? Having acupuncture.  ? Avoiding foods that bring on migraine headaches.  ? Learning ways to control your body functions (biofeedback).  ? Therapy to help you know and deal with negative thoughts (cognitive behavioral therapy).  Follow these instructions at home:  Medicines  · Take over-the-counter and prescription medicines only as told by your doctor.  · Ask your doctor if the medicine prescribed to you:  ? Requires you to avoid driving or using heavy machinery.  ? Can cause trouble pooping (constipation). You may need to take these steps to prevent or treat trouble pooping:  § Drink enough fluid to keep your pee (urine) pale yellow.  § Take over-the-counter or prescription medicines.  § Eat foods that are high in fiber.  These include beans, whole grains, and fresh fruits and vegetables.  § Limit foods that are high in fat and sugar. These include fried or sweet foods.  Lifestyle  · Do not drink alcohol.  · Do not use any products that contain nicotine or tobacco, such as cigarettes, e-cigarettes, and chewing tobacco. If you need help quitting, ask your doctor.  · Get at least 8 hours of sleep every night.  · Limit and deal with stress.  General instructions         · Keep a journal to find out what may bring on your migraine headaches. For example, write down:  ? What you eat and drink.  ? How much sleep you get.  ? Any change in what you eat or drink.  ? Any change in your medicines.  · If you have a migraine headache:  ? Avoid things that make your symptoms worse, such as bright lights.  ? It may help to lie down in a dark, quiet room.  ? Do not drive or use heavy machinery.  ? Ask your doctor what activities are safe for you.  · Keep all follow-up visits as told by your doctor. This is important.  Contact a doctor if:  · You get a migraine headache that is different or worse than others you have had.  · You have more than 15 headache days in one month.  Get help right away if:  · Your migraine headache gets very bad.  · Your migraine headache lasts longer than 72 hours.  · You have a fever.  · You have a stiff neck.  · You have trouble seeing.  · Your muscles feel weak or like you cannot control them.  · You start to lose your balance a lot.  · You start to have trouble walking.  · You pass out (faint).  · You have a seizure.  Summary  · A migraine headache is a very strong throbbing pain on one side or both sides of your head. These headaches can also cause other symptoms.  · This condition may be treated with medicines and changes to your lifestyle.  · Keep a journal to find out what may bring on your migraine headaches.  · Contact a doctor if you get a migraine headache that is different or worse than others you have  had.  · Contact your doctor if you have more than 15 headache days in a month.  This information is not intended to replace advice given to you by your health care provider. Make sure you discuss any questions you have with your health care provider.  Document Released: 09/26/2009 Document Revised: 04/10/2020 Document Reviewed: 01/30/2020  [employer] Patient Education © 2020 [employer]

## 2021-01-14 DIAGNOSIS — Z23 NEED FOR VACCINATION: ICD-10-CM

## 2021-07-06 ENCOUNTER — HOSPITAL ENCOUNTER (OUTPATIENT)
Dept: HOSPITAL 8 - CFH | Age: 81
Discharge: HOME | End: 2021-07-06
Attending: INTERNAL MEDICINE
Payer: MEDICARE

## 2021-07-06 DIAGNOSIS — I08.2: Primary | ICD-10-CM

## 2021-07-06 DIAGNOSIS — I48.0: ICD-10-CM

## 2021-07-06 PROCEDURE — 93306 TTE W/DOPPLER COMPLETE: CPT

## 2021-11-29 ENCOUNTER — HOSPITAL ENCOUNTER (EMERGENCY)
Facility: MEDICAL CENTER | Age: 81
End: 2021-11-29
Payer: MEDICARE

## 2021-11-29 ENCOUNTER — OFFICE VISIT (OUTPATIENT)
Dept: URGENT CARE | Facility: CLINIC | Age: 81
End: 2021-11-29
Payer: MEDICARE

## 2021-11-29 VITALS
SYSTOLIC BLOOD PRESSURE: 130 MMHG | BODY MASS INDEX: 28.53 KG/M2 | TEMPERATURE: 98.1 F | WEIGHT: 203.8 LBS | DIASTOLIC BLOOD PRESSURE: 80 MMHG | HEART RATE: 79 BPM | RESPIRATION RATE: 14 BRPM | OXYGEN SATURATION: 97 % | HEIGHT: 71 IN

## 2021-11-29 VITALS
WEIGHT: 202.82 LBS | DIASTOLIC BLOOD PRESSURE: 98 MMHG | HEIGHT: 71 IN | SYSTOLIC BLOOD PRESSURE: 179 MMHG | HEART RATE: 95 BPM | RESPIRATION RATE: 16 BRPM | OXYGEN SATURATION: 96 % | TEMPERATURE: 98.3 F | BODY MASS INDEX: 28.4 KG/M2

## 2021-11-29 DIAGNOSIS — R07.9 ACUTE CHEST PAIN: ICD-10-CM

## 2021-11-29 LAB
EKG 4674: NORMAL
EKG IMPRESSION: NORMAL

## 2021-11-29 PROCEDURE — 93000 ELECTROCARDIOGRAM COMPLETE: CPT | Performed by: NURSE PRACTITIONER

## 2021-11-29 PROCEDURE — 93005 ELECTROCARDIOGRAM TRACING: CPT

## 2021-11-29 PROCEDURE — 99204 OFFICE O/P NEW MOD 45 MIN: CPT | Performed by: NURSE PRACTITIONER

## 2021-11-29 PROCEDURE — 302449 STATCHG TRIAGE ONLY (STATISTIC)

## 2021-11-29 RX ORDER — TADALAFIL 20 MG/1
20 TABLET ORAL PRN
COMMUNITY
End: 2024-01-31

## 2021-11-29 RX ORDER — MAGNESIUM OXIDE 400 MG/1
400 TABLET ORAL DAILY
COMMUNITY
End: 2023-06-14

## 2021-11-29 ASSESSMENT — FIBROSIS 4 INDEX
FIB4 SCORE: 2.04
FIB4 SCORE: 2.04

## 2021-11-30 NOTE — ED TRIAGE NOTES
"81 yr old male to triage  Chief Complaint   Patient presents with   • Chest Pain     Patient report of chest tightness since 2 pm and seen an [facility] at  5 pm had EKG.  Patient denies shortness of breath, no dizziness or lightheadedness.      Has this patient been vaccinated for COVID Yes Pfizer  If not, would they like to be vaccinated while in the ER if eligible?  N/A  Would the patient like to speak with the ERP about the possibility of receiving the COVID vaccine today before making a decision? N/A    BP (!) 179/98   Pulse 95   Temp 36.8 °C (98.3 °F) (Temporal)   Resp 16   Ht 1.803 m (5' 11\")   Wt 92 kg (202 lb 13.2 oz)   SpO2 96%   BMI 28.29 kg/m²     "

## 2021-11-30 NOTE — PROGRESS NOTES
Chief Complaint   Patient presents with   • Chest Pain     middle/pt states for tight than pain and belching. x today        HISTORY OF PRESENT ILLNESS: Patient is a pleasant 81 y.o. male with a history of atrial fibrillation, hypertension, mitral valve repair who presents to urgent care today with complaints of chest pain.  The patient had 3 episodes of substernal chest pain over the past few hours.  The pain lasted approximately 10 minutes at a time.  Denies any radiation.  Denies associated symptoms including nausea, vomiting, shortness of breath, syncope, dizziness.  He has not taken any medication for symptom relief.    Patient Active Problem List    Diagnosis Date Noted   • Transient neurological symptoms 12/23/2020   • Complicated migraine 12/23/2020   • Hypothyroidism 11/02/2020   • Glaucoma 11/02/2020   • BPH (benign prostatic hyperplasia) 11/02/2020   • Nasal septal deviation 06/04/2020   • Nasal valve collapse 06/04/2020   • Hypertrophy of both inferior nasal turbinates 06/04/2020   • Near syncope 12/28/2017   • AF (atrial fibrillation) (HCC) 12/28/2017   • Anxiety 12/28/2017   • Dizziness 12/28/2017   • Subtherapeutic international normalized ratio (INR) 12/28/2017   • Hypertension 12/28/2017   • Palpitations 01/25/2012   • Mitral valve disorder 01/25/2012   • Anxiety disorder 11/10/2011   • Other chest pain 11/10/2011       Allergies:Celebrex [celecoxib] and Vioxx [rofecoxib]    Current Outpatient Medications Ordered in Epic   Medication Sig Dispense Refill   • tadalafil (CIALIS) 20 MG tablet Take 20 mg by mouth as needed for Erectile Dysfunction.     • Latanoprost 0.005 % Emulsion Administer  into affected eye(s).     • magnesium oxide (MAG-OX) 400 MG Tab tablet Take 400 mg by mouth every day.     • Apoaequorin (PREVAGEN) 10 MG Cap Take  by mouth.     • CARVEDILOL PO Take  by mouth.     • escitalopram (LEXAPRO) 10 MG Tab Take 10 mg by mouth every bedtime.     • timolol (TIMOPTIC) 0.5 % Solution Place 1  Drop in both eyes 2 times a day.     • aspirin EC (ECOTRIN) 81 MG Tablet Delayed Response Take 1 Tab by mouth every day. 90 Tab 0   • finasteride (PROSCAR) 5 MG Tab Take 5 mg by mouth every day.     • Cyanocobalamin (VITAMIN B12 PO) Take 1 Tab by mouth every day.     • levothyroxine (SYNTHROID) 25 MCG Tab Take 25 mcg by mouth Every morning on an empty stomach.     • atorvastatin (LIPITOR) 10 MG Tab Take 10 mg by mouth every evening.     • Biotin 2500 MCG Cap Take 1 Cap by mouth every day.     • Cholecalciferol (VITAMIN D3) 2000 UNIT Cap Take 1 Cap by mouth every day.     • naproxen (NAPROSYN) 500 MG Tab naproxen 500 mg tablet (Patient not taking: Reported on 2021)     • Brinzolamide-Brimonidine (SIMBRINZA) 1-0.2 % Suspension Simbrinza 1 %-0.2 % eye drops,suspension   INSTILL 1 DROP INTO BOTH EYES TWICE A DAY (Patient not taking: Reported on 2021)     • Brinzolamide-Brimonidine (SIMBRINZA) 1-0.2 % Suspension 1 Drop by Ophthalmic route 2 Times a Day. (Patient not taking: Reported on 2021)     • Testosterone (ANDROGEL) 25 MG/2.5GM GEL Apply 2.5 g to skin as directed every day. (Patient not taking: Reported on 2021)       No current [facility]-ordered facility-administered medications on file.       Past Medical History:   Diagnosis Date   • Arthritis     osteo/ fingers hips   • Bowel habit changes     constipation   • Cataract     gianna iol   • Coronary artery disease    • Disorder of thyroid    • Glaucoma    • Headache(784.0)    • Heart burn    • Heart valve disease    • Indigestion    • Migraine    • Muscle disorder    • Psychiatric problem     anxiety   • Stroke ([facility]) 2017    no weakness/problems post stroke       Social History     Tobacco Use   • Smoking status: Former Smoker     Packs/day: 1.00     Types: Cigarettes, Pipe     Quit date: 1970     Years since quittin.9   • Smokeless tobacco: Never Used   Substance Use Topics   • Alcohol use: Yes     Alcohol/week: 0.5 oz     Types: 1  "Glasses of wine per week     Comment: 1 per day   • Drug use: No       Family Status   Relation Name Status   • Mo     • Fa     • Bro  Alive   • MAunt     • MUnc     • PAunt     • PUnc     • MGMo     • MGFa     • PGMo     • PGFa       Family History   Problem Relation Age of Onset   • Arthritis Mother    • Genetic Disorder Mother    • Cancer Father    • Alcohol/Drug Father    • Arthritis Brother    • Genetic Disorder Brother    • Alcohol/Drug Brother    • Alcohol/Drug Maternal Aunt    • Stroke Maternal Uncle    • Alcohol/Drug Maternal Uncle    • Genetic Disorder Paternal Uncle    • Genetic Disorder Maternal Grandmother    • Stroke Maternal Grandfather    • Genetic Disorder Paternal Grandmother    • Psychiatric Illness Paternal Grandmother        ROS:  Review of Systems   Constitutional: Negative for fever, chills, weight loss, malaise, and fatigue.   HENT: Negative for ear pain, nosebleeds, congestion, sore throat and neck pain.    Eyes: Negative for vision changes.   Neuro: Negative for headache, sensory changes, weakness, seizure, LOC.   Cardiovascular: Positive for chest pain.   Negative for palpitations, orthopnea and leg swelling.   Respiratory: Negative for cough, sputum production, shortness of breath and wheezing.   Gastrointestinal: Negative for abdominal pain, nausea, vomiting or diarrhea.   Genitourinary: Negative for dysuria, urgency and frequency.  Musculoskeletal: Negative for falls, neck pain, back pain, joint pain, myalgias.   Skin: Negative for rash, diaphoresis.     Exam:  /80   Pulse 79   Temp 36.7 °C (98.1 °F)   Resp 14   Ht 1.803 m (5' 11\")   Wt 92.4 kg (203 lb 12.8 oz)   SpO2 97%   General: well-nourished, well-developed male in NAD  Head: normocephalic, atraumatic  Eyes: PERRLA, no conjunctival injection, acuity grossly intact, lids normal.  Ears: normal shape and symmetry, no tenderness, no discharge. " External canals are without any significant edema or erythema. Tympanic membranes are without any inflammation, no effusion. Gross auditory acuity is intact.  Nose: symmetrical without tenderness, no discharge.  Mouth/Throat: reasonable hygiene, no erythema, exudates or tonsillar enlargement.  Neck: no masses, range of motion within normal limits, no tracheal deviation. No obvious thyroid enlargement.   Lymph: no cervical adenopathy. No supraclavicular adenopathy.   Neuro: alert and oriented. Cranial nerves 1-12 grossly intact. No sensory deficit.   Cardiovascular: regular rate and rhythm. No edema.  Pulmonary: no distress. Chest is symmetrical with respiration, no wheezes, crackles, or rhonchi.   Musculoskeletal: no clubbing, appropriate muscle tone, gait is stable.  Skin: warm, dry, intact, no clubbing, no cyanosis, no rashes.   Psych: appropriate mood, affect, judgement.       12-lead study EKG interpretation, interpreted by myself.  The rhythm is sinus with a rate of 75.  There is no ectopy. There are no acute ST segment changes and no T wave abnormalities.  Interpretation: No ST segment elevation myocardial infarction.        Assessment/Plan:  1. Acute chest pain  EKG       The patient is an 81-year-old male with a cardiac history who presents to urgent care today with concerns of chest pain today.  He is in no distress, no ST elevation noted upon EKG.  Nevertheless at this time, I feel the patient requires a higher level of care in the ED for closer monitoring, stat lab work and/or imaging for further evaluation. This has been discussed with the patient and he states agreement and understanding.  Patient has yet to decide which ED he will be going to but will go without delay, driven by his wife.  I have offered an ambulance ride for the patient, he is politely declining.  The patient is stable to leave V at this time and will go directly to ED without delay.       Please note that this dictation was created  using voice recognition software. I have made every reasonable attempt to correct obvious errors, but I expect that there are errors of grammar and possibly content that I did not discover before finalizing the note.      GIGI Fuentes. '

## 2022-02-15 ENCOUNTER — OFFICE VISIT (OUTPATIENT)
Dept: URGENT CARE | Facility: PHYSICIAN GROUP | Age: 82
End: 2022-02-15
Payer: MEDICARE

## 2022-02-15 ENCOUNTER — HOSPITAL ENCOUNTER (OUTPATIENT)
Dept: RADIOLOGY | Facility: MEDICAL CENTER | Age: 82
End: 2022-02-15
Attending: FAMILY MEDICINE
Payer: MEDICARE

## 2022-02-15 VITALS
OXYGEN SATURATION: 95 % | DIASTOLIC BLOOD PRESSURE: 84 MMHG | SYSTOLIC BLOOD PRESSURE: 132 MMHG | RESPIRATION RATE: 18 BRPM | TEMPERATURE: 98.4 F | HEART RATE: 91 BPM

## 2022-02-15 DIAGNOSIS — S01.81XA LACERATION OF FOREHEAD, INITIAL ENCOUNTER: ICD-10-CM

## 2022-02-15 DIAGNOSIS — S09.8XXA BLUNT HEAD TRAUMA, INITIAL ENCOUNTER: ICD-10-CM

## 2022-02-15 PROBLEM — E78.49 OTHER HYPERLIPIDEMIA: Status: ACTIVE | Noted: 2022-01-07

## 2022-02-15 PROBLEM — Z95.1 S/P CABG (CORONARY ARTERY BYPASS GRAFT): Status: ACTIVE | Noted: 2022-01-07

## 2022-02-15 PROCEDURE — 70450 CT HEAD/BRAIN W/O DYE: CPT | Mod: ME

## 2022-02-15 PROCEDURE — 90471 IMMUNIZATION ADMIN: CPT | Performed by: FAMILY MEDICINE

## 2022-02-15 PROCEDURE — 90715 TDAP VACCINE 7 YRS/> IM: CPT | Performed by: FAMILY MEDICINE

## 2022-02-15 PROCEDURE — 99213 OFFICE O/P EST LOW 20 MIN: CPT | Mod: 25 | Performed by: FAMILY MEDICINE

## 2022-02-15 PROCEDURE — 12014 RPR F/E/E/N/L/M 5.1-7.5 CM: CPT | Performed by: FAMILY MEDICINE

## 2022-02-15 RX ORDER — MULTIVITAMIN WITH IRON
1 TABLET ORAL
COMMUNITY

## 2022-02-16 NOTE — PROGRESS NOTES
Subjective:      Chief Complaint   Patient presents with   • Head Injury     forehead laceration         Head Injury       S/p trip and ground-level fall, hit forehead on curb outside, 20 min PTA.   He sustained a laceration to forehead           Pertinent negatives include no n/v, blurred vision, disorientation, memory loss, numbness, tinnitus or weakness.       Past Medical History:   Diagnosis Date   • Stroke (HCC) 2017    no weakness/problems post stroke   • Arthritis     osteo/ fingers hips   • Bowel habit changes     constipation   • Cataract     gianna iol   • Coronary artery disease    • Disorder of thyroid    • Glaucoma    • Headache(784.0)    • Heart burn    • Heart valve disease    • Indigestion    • Migraine    • Muscle disorder    • Psychiatric problem     anxiety         Social History     Tobacco Use   • Smoking status: Former Smoker     Packs/day: 1.00     Types: Cigarettes, Pipe     Quit date: 1970     Years since quittin.1   • Smokeless tobacco: Never Used   Substance Use Topics   • Alcohol use: Yes     Alcohol/week: 0.5 oz     Types: 1 Glasses of wine per week     Comment: 1 per day   • Drug use: No         Current Outpatient Medications on File Prior to Visit   Medication Sig Dispense Refill   • Magnesium 250 MG Tab Take 1 Tablet by mouth.     • Probiotic Product (ALIGN) Cap Take 1 Capsule by mouth.     • tadalafil (CIALIS) 20 MG tablet Take 20 mg by mouth as needed for Erectile Dysfunction.     • Latanoprost 0.005 % Emulsion Administer  into affected eye(s).     • magnesium oxide (MAG-OX) 400 MG Tab tablet Take 400 mg by mouth every day.     • Apoaequorin (PREVAGEN) 10 MG Cap Take  by mouth.     • CARVEDILOL PO Take  by mouth.     • naproxen (NAPROSYN) 500 MG Tab naproxen 500 mg tablet     • Brinzolamide-Brimonidine (SIMBRINZA) 1-0.2 % Suspension Simbrinza 1 %-0.2 % eye drops,suspension   INSTILL 1 DROP INTO BOTH EYES TWICE A DAY     • escitalopram (LEXAPRO) 10 MG Tab Take 10 mg by  mouth every bedtime.     • timolol (TIMOPTIC) 0.5 % Solution Place 1 Drop in both eyes 2 times a day.     • aspirin EC (ECOTRIN) 81 MG Tablet Delayed Response Take 1 Tab by mouth every day. 90 Tab 0   • finasteride (PROSCAR) 5 MG Tab Take 5 mg by mouth every day.     • Cyanocobalamin (VITAMIN B12 PO) Take 1 Tab by mouth every day.     • levothyroxine (SYNTHROID) 25 MCG Tab Take 25 mcg by mouth Every morning on an empty stomach.     • atorvastatin (LIPITOR) 10 MG Tab Take 10 mg by mouth every evening.     • Biotin 2500 MCG Cap Take 1 Cap by mouth every day.     • Cholecalciferol (VITAMIN D3) 2000 UNIT Cap Take 1 Cap by mouth every day.     • Brinzolamide-Brimonidine (SIMBRINZA) 1-0.2 % Suspension Administer 1 Drop into affected eye(s) 2 times a day.     • Testosterone (ANDROGEL) 25 MG/2.5GM GEL Place 2.5 g on the skin every day.       No current facility-administered medications on file prior to visit.         Family History   Problem Relation Age of Onset   • Arthritis Mother    • Genetic Disorder Mother    • Cancer Father    • Alcohol/Drug Father    • Arthritis Brother    • Genetic Disorder Brother    • Alcohol/Drug Brother    • Alcohol/Drug Maternal Aunt    • Stroke Maternal Uncle    • Alcohol/Drug Maternal Uncle    • Genetic Disorder Paternal Uncle    • Genetic Disorder Maternal Grandmother    • Stroke Maternal Grandfather    • Genetic Disorder Paternal Grandmother    • Psychiatric Illness Paternal Grandmother          Review of Systems   Constitutional: Negative for fever.   HENT: Negative for tinnitus.    Eyes: Negative for blurred vision and double vision.   Respiratory: Negative for shortness of breath.    Cardiovascular: Negative for chest pain.   Gastrointestinal: Negative for abdominal pain, n/v.   Skin: Negative for itching and rash.   Neurological:   Negative for dizziness, tingling, tremors, sensory change, weakness and numbness.   Psychiatric/Behavioral: Negative for memory loss.   All other systems  reviewed and are negative.         Objective:     /84   Pulse 91   Temp 36.9 °C (98.4 °F)   Resp 18   SpO2 95%         Physical Exam   Constitutional: pt is oriented to person, place, and time. Pt appears well-developed and well-nourished. No distress.   HENT:   Head: Normocephalic .    There is a 6cm linear, superficial laceration over left forehead.    No bony step-off   No periorbital ecchymosis, Alejandro's sign  , hemotympanum , cerebrospinal fluid  otorrhea, or CSF rhinorrhea  Right Ear: External ear normal.   Left Ear: External ear normal.   Mouth/Throat: Oropharynx is clear and moist.   Eyes: Conjunctivae and EOM are normal. Pupils are equal, round, and reactive to light. No scleral icterus.   Neck: Normal range of motion. Neck supple.   Cardiovascular: Normal rate, regular rhythm, normal heart sounds and intact distal pulses.  Exam reveals no gallop and no friction rub.    No murmur heard.  Pulmonary/Chest: Effort normal and breath sounds normal. No respiratory distress. Pt has no wheezes or rales.  .   Musculoskeletal: Normal range of motion. no edema or tenderness.   Neurologic: Alert and oriented. Cranial nerves II-XII intact, EOMs intact, no tongue deviation, PERRL, no facial asymmetry to motor or sensation, symmetric palate, normal finger-to-nose test, no pronator drift. No focal motor deficits. Symmetric reflexes. Normal station and gait, normal tandem walk. Coordination normal.   Skin: Skin is warm and dry. Pt is not diaphoretic. No erythema.   Psychiatric: pt has a normal mood and affect. The patient's behavior is normal. Judgment normal.   Nursing note and vitals reviewed.         Details    Reading Physician Reading Date Result Priority   Charlie Huertas M.D.  [phone] 2/15/2022      Narrative & Impression     2/15/2022 4:14 PM     HISTORY/REASON FOR EXAM:  Ground-level fall.     TECHNIQUE/EXAM DESCRIPTION AND NUMBER OF VIEWS:  CT of the head without contrast.     Up to date radiation  dose reduction adjustments have been utilized to meet ALARA standards for radiation dose reduction.     COMPARISON: None available     FINDINGS: There is no evidence of mass or mass effect. CSF spaces are normal. There is moderate periventricular chronic small vessel ischemic change present. There is no intracranial hemorrhage seen. The calvarium is intact. There is no scalp injury.   There is no evidence of sinus disease.        IMPRESSION:     1.  No evidence of acute intracranial process.     2.  . Periventricular chronic small vessel ischemic change.                Exam Ended: 02/15/22  4:25 PM Last Resulted: 02/15/22  4:31 PM                  Assessment/Plan:      1. Laceration of forehead, initial encounter    The wound was thoroughly irrigated with copious amount of normal saline.   Area was then prepped in the usual sterile fashion.    Local field block was obtained with 1% Lidocaine with Epinephrine.    Wound was cleansed and debrided of as much devitalized tissue as possible.  Wound explored and found to be relatively superficial and no foreign bodies appreciated.  I approximated the wound edges and closed the laceration with  interrupted sutures of 5-0 ETHILON monofilament.  Area was then cleansed and dressed.    Wound care instructions and ER precautions given.   RTC in 7-10 d for wound check/suture removal.     Tetanus vaccination given.    2. Blunt head trauma, initial encounter  CT shows no bleed.      Tylenol, prn pain

## 2022-02-24 ENCOUNTER — OFFICE VISIT (OUTPATIENT)
Dept: URGENT CARE | Facility: PHYSICIAN GROUP | Age: 82
End: 2022-02-24
Payer: MEDICARE

## 2022-02-24 VITALS
OXYGEN SATURATION: 98 % | SYSTOLIC BLOOD PRESSURE: 140 MMHG | TEMPERATURE: 97.6 F | RESPIRATION RATE: 20 BRPM | HEART RATE: 63 BPM | BODY MASS INDEX: 26.6 KG/M2 | HEIGHT: 71 IN | DIASTOLIC BLOOD PRESSURE: 74 MMHG | WEIGHT: 190 LBS

## 2022-02-24 DIAGNOSIS — S01.81XA LACERATION OF FOREHEAD, INITIAL ENCOUNTER: ICD-10-CM

## 2022-02-24 PROCEDURE — 99212 OFFICE O/P EST SF 10 MIN: CPT | Performed by: STUDENT IN AN ORGANIZED HEALTH CARE EDUCATION/TRAINING PROGRAM

## 2022-02-24 ASSESSMENT — FIBROSIS 4 INDEX: FIB4 SCORE: 2.04

## 2022-02-24 NOTE — PROGRESS NOTES
Subjective:   CHIEF COMPLAINT  Chief Complaint   Patient presents with   • Suture / Staple Removal     Forehead;        HPI  Jett Nava is a 81 y.o. male who presents for follow-up of suture removal.  Patient was seen in urgent care on 2/15/2022 status post mechanical fall hitting his left forehead/eyebrow.  5 simple interrupted sutures were placed.  Patient is having no issues with the wound.  No pain.    REVIEW OF SYSTEMS  General: no fever or chills  GI: no nausea or vomiting  See HPI for further details.    PAST MEDICAL HISTORY  Patient Active Problem List    Diagnosis Date Noted   • S/P CABG (coronary artery bypass graft) 01/07/2022   • Other hyperlipidemia 01/07/2022   • Transient neurological symptoms 12/23/2020   • Complicated migraine 12/23/2020   • Hypothyroidism 11/02/2020   • Glaucoma 11/02/2020   • BPH (benign prostatic hyperplasia) 11/02/2020   • Nasal septal deviation 06/04/2020   • Nasal valve collapse 06/04/2020   • Hypertrophy of both inferior nasal turbinates 06/04/2020   • Near syncope 12/28/2017   • AF (atrial fibrillation) ([facility]) 12/28/2017   • Anxiety 12/28/2017   • Dizziness 12/28/2017   • Subtherapeutic international normalized ratio (INR) 12/28/2017   • Hypertension 12/28/2017   • Palpitations 01/25/2012   • Mitral valve disorder 01/25/2012   • Anxiety disorder 11/10/2011   • Other chest pain 11/10/2011       SURGICAL HISTORY   has a past surgical history that includes turp-vapor; cataract phaco with iol; other cardiac surgery (2017); other neurological surg (1968); knee arthroplasty total; orif, fracture, tibia; open reduction; knee arthroplasty total (Left, 1/21/2019); other orthopedic surgery (2006); other orthopedic surgery (2009); repair of nasal septum (N/A, 6/4/2020); repair or reconstruction, nasal valve (N/A, 6/4/2020); and turbinate reduction (Bilateral, 6/4/2020).    ALLERGIES  Allergies   Allergen Reactions   • Celebrex [Celecoxib]      Stomach ache   • Vioxx  [Rofecoxib]      Stomach ache       CURRENT MEDICATIONS  Home Medications     Reviewed by Luis Rowe D.O. (Physician) on 22 at 1057  Med List Status: <None>   Medication Last Dose Status   Apoaequorin (PREVAGEN) 10 MG Cap Taking Active   aspirin EC (ECOTRIN) 81 MG Tablet Delayed Response Taking Active   atorvastatin (LIPITOR) 10 MG Tab Taking Active   Biotin 2500 MCG Cap Taking Active   Brinzolamide-Brimonidine (SIMBRINZA) 1-0.2 % Suspension Taking Active   Brinzolamide-Brimonidine 1-0.2 % Suspension Taking Active   CARVEDILOL PO Taking Active   Cholecalciferol (VITAMIN D3) 2000 UNIT Cap Taking Active   Cyanocobalamin (VITAMIN B12 PO) Taking Active   escitalopram (LEXAPRO) 10 MG Tab Taking Active   finasteride (PROSCAR) 5 MG Tab Taking Active   Latanoprost 0.005 % Emulsion Taking Active   levothyroxine (SYNTHROID) 25 MCG Tab Taking Active   Magnesium 250 MG Tab Taking Active   magnesium oxide (MAG-OX) 400 MG Tab tablet Taking Active   naproxen (NAPROSYN) 500 MG Tab Taking Active   Probiotic Product (ALIGN) Cap Taking Active   tadalafil (CIALIS) 20 MG tablet Taking Active   Testosterone 25 MG/2.5GM (1%) Gel Taking Active   timolol (TIMOPTIC) 0.5 % Solution Taking Active                SOCIAL HISTORY  Social History     Tobacco Use   • Smoking status: Former Smoker     Packs/day: 1.00     Types: Cigarettes, Pipe     Quit date: 1970     Years since quittin.1   • Smokeless tobacco: Never Used   Vaping Use   • Vaping Use: Never used   Substance and Sexual Activity   • Alcohol use: Yes     Alcohol/week: 0.5 oz     Types: 1 Glasses of wine per week     Comment: 1 per day   • Drug use: No   • Sexual activity: Not on file       FAMILY HISTORY  Family History   Problem Relation Age of Onset   • Arthritis Mother    • Genetic Disorder Mother    • Cancer Father    • Alcohol/Drug Father    • Arthritis Brother    • Genetic Disorder Brother    • Alcohol/Drug Brother    • Alcohol/Drug Maternal Aunt    • Stroke  "Maternal Uncle    • Alcohol/Drug Maternal Uncle    • Genetic Disorder Paternal Uncle    • Genetic Disorder Maternal Grandmother    • Stroke Maternal Grandfather    • Genetic Disorder Paternal Grandmother    • Psychiatric Illness Paternal Grandmother           Objective:   PHYSICAL EXAM  VITAL SIGNS: /74 (BP Location: Left arm, Patient Position: Sitting, BP Cuff Size: Adult)   Pulse 63   Temp 36.4 °C (97.6 °F) (Temporal)   Resp 20   Ht 1.803 m (5' 11\")   Wt 86.2 kg (190 lb)   SpO2 98%   BMI 26.50 kg/m²     Gen: no acute distress, normal voice  Skin: 6 cm linear laceration adjacent to the left eyebrow which is clean, dry and intact and well approximated.  5 interrupted sutures in place.  Head: Atraumatic, normocephalic  Psych: normal affect, normal judgement, alert, awake         Assessment/Plan:     1. Laceration of forehead, subsequent encounter     Wound appropriately healed.  5 simple interrupted sutures removed.  Patient tolerated procedure well without any complications.  Discussed routine wound care follow-up precautions.  Patient understood and discussed.  All questions were answered.    Differential diagnosis, natural history, supportive care, and indications for immediate follow-up discussed. All questions answered. Patient agrees with the plan of care.    Follow-up as needed if symptoms worsen or fail to improve to PCP, Urgent care or Emergency Room.    Please note that this dictation was created using voice recognition software. I have made a reasonable attempt to correct obvious errors, but I expect that there are errors of grammar and possibly content that I did not discover before finalizing the note.         "

## 2022-11-02 ENCOUNTER — PATIENT MESSAGE (OUTPATIENT)
Dept: HEALTH INFORMATION MANAGEMENT | Facility: OTHER | Age: 82
End: 2022-11-02

## 2023-06-08 PROBLEM — M16.12 OSTEOARTHRITIS OF LEFT HIP: Status: ACTIVE | Noted: 2023-06-08

## 2023-06-29 ENCOUNTER — APPOINTMENT (OUTPATIENT)
Dept: ADMISSIONS | Facility: MEDICAL CENTER | Age: 83
End: 2023-06-29
Attending: OTOLARYNGOLOGY
Payer: MEDICARE

## 2023-07-07 ENCOUNTER — PRE-ADMISSION TESTING (OUTPATIENT)
Dept: ADMISSIONS | Facility: MEDICAL CENTER | Age: 83
End: 2023-07-07
Attending: OTOLARYNGOLOGY
Payer: MEDICARE

## 2023-08-11 ENCOUNTER — PRE-ADMISSION TESTING (OUTPATIENT)
Dept: ADMISSIONS | Facility: MEDICAL CENTER | Age: 83
End: 2023-08-11
Payer: MEDICARE

## 2023-08-14 ENCOUNTER — ANESTHESIA EVENT (OUTPATIENT)
Dept: SURGERY | Facility: MEDICAL CENTER | Age: 83
End: 2023-08-14
Payer: MEDICARE

## 2023-08-14 ENCOUNTER — ANESTHESIA (OUTPATIENT)
Dept: SURGERY | Facility: MEDICAL CENTER | Age: 83
End: 2023-08-14
Payer: MEDICARE

## 2023-08-14 ENCOUNTER — HOSPITAL ENCOUNTER (OUTPATIENT)
Facility: MEDICAL CENTER | Age: 83
End: 2023-08-14
Attending: OTOLARYNGOLOGY | Admitting: OTOLARYNGOLOGY
Payer: MEDICARE

## 2023-08-14 VITALS
DIASTOLIC BLOOD PRESSURE: 89 MMHG | TEMPERATURE: 97.8 F | OXYGEN SATURATION: 99 % | BODY MASS INDEX: 28.02 KG/M2 | WEIGHT: 200.18 LBS | HEART RATE: 68 BPM | SYSTOLIC BLOOD PRESSURE: 164 MMHG | HEIGHT: 71 IN | RESPIRATION RATE: 13 BRPM

## 2023-08-14 PROCEDURE — 160048 HCHG OR STATISTICAL LEVEL 1-5: Performed by: OTOLARYNGOLOGY

## 2023-08-14 PROCEDURE — 160029 HCHG SURGERY MINUTES - 1ST 30 MINS LEVEL 4: Performed by: OTOLARYNGOLOGY

## 2023-08-14 PROCEDURE — 160025 RECOVERY II MINUTES (STATS): Performed by: OTOLARYNGOLOGY

## 2023-08-14 PROCEDURE — 160036 HCHG PACU - EA ADDL 30 MINS PHASE I: Performed by: OTOLARYNGOLOGY

## 2023-08-14 PROCEDURE — 700101 HCHG RX REV CODE 250: Performed by: ANESTHESIOLOGY

## 2023-08-14 PROCEDURE — 700102 HCHG RX REV CODE 250 W/ 637 OVERRIDE(OP): Performed by: ANESTHESIOLOGY

## 2023-08-14 PROCEDURE — C1878 MATRL FOR VOCAL CORD: HCPCS | Performed by: OTOLARYNGOLOGY

## 2023-08-14 PROCEDURE — 700111 HCHG RX REV CODE 636 W/ 250 OVERRIDE (IP): Mod: JZ | Performed by: ANESTHESIOLOGY

## 2023-08-14 PROCEDURE — 160041 HCHG SURGERY MINUTES - EA ADDL 1 MIN LEVEL 4: Performed by: OTOLARYNGOLOGY

## 2023-08-14 PROCEDURE — 700105 HCHG RX REV CODE 258: Mod: JZ | Performed by: OTOLARYNGOLOGY

## 2023-08-14 PROCEDURE — 160009 HCHG ANES TIME/MIN: Performed by: OTOLARYNGOLOGY

## 2023-08-14 PROCEDURE — A9270 NON-COVERED ITEM OR SERVICE: HCPCS | Performed by: ANESTHESIOLOGY

## 2023-08-14 PROCEDURE — 160035 HCHG PACU - 1ST 60 MINS PHASE I: Performed by: OTOLARYNGOLOGY

## 2023-08-14 PROCEDURE — 160002 HCHG RECOVERY MINUTES (STAT): Performed by: OTOLARYNGOLOGY

## 2023-08-14 PROCEDURE — 160046 HCHG PACU - 1ST 60 MINS PHASE II: Performed by: OTOLARYNGOLOGY

## 2023-08-14 DEVICE — KIT IMPLANT W/2 NEEDLES PROLARYN GEL 1CC: Type: IMPLANTABLE DEVICE | Site: MOUTH | Status: FUNCTIONAL

## 2023-08-14 RX ORDER — ONDANSETRON 2 MG/ML
INJECTION INTRAMUSCULAR; INTRAVENOUS PRN
Status: DISCONTINUED | OUTPATIENT
Start: 2023-08-14 | End: 2023-08-14 | Stop reason: SURG

## 2023-08-14 RX ORDER — OXYCODONE HCL 5 MG/5 ML
10 SOLUTION, ORAL ORAL
Status: COMPLETED | OUTPATIENT
Start: 2023-08-14 | End: 2023-08-14

## 2023-08-14 RX ORDER — HYDROMORPHONE HYDROCHLORIDE 1 MG/ML
0.2 INJECTION, SOLUTION INTRAMUSCULAR; INTRAVENOUS; SUBCUTANEOUS
Status: DISCONTINUED | OUTPATIENT
Start: 2023-08-14 | End: 2023-08-14 | Stop reason: HOSPADM

## 2023-08-14 RX ORDER — OXYCODONE HCL 5 MG/5 ML
5 SOLUTION, ORAL ORAL
Status: COMPLETED | OUTPATIENT
Start: 2023-08-14 | End: 2023-08-14

## 2023-08-14 RX ORDER — DEXMEDETOMIDINE HYDROCHLORIDE 100 UG/ML
INJECTION, SOLUTION INTRAVENOUS PRN
Status: DISCONTINUED | OUTPATIENT
Start: 2023-08-14 | End: 2023-08-14 | Stop reason: SURG

## 2023-08-14 RX ORDER — HYDROMORPHONE HYDROCHLORIDE 1 MG/ML
0.4 INJECTION, SOLUTION INTRAMUSCULAR; INTRAVENOUS; SUBCUTANEOUS
Status: DISCONTINUED | OUTPATIENT
Start: 2023-08-14 | End: 2023-08-14 | Stop reason: HOSPADM

## 2023-08-14 RX ORDER — SODIUM CHLORIDE, SODIUM LACTATE, POTASSIUM CHLORIDE, CALCIUM CHLORIDE 600; 310; 30; 20 MG/100ML; MG/100ML; MG/100ML; MG/100ML
INJECTION, SOLUTION INTRAVENOUS CONTINUOUS
Status: DISCONTINUED | OUTPATIENT
Start: 2023-08-14 | End: 2023-08-14 | Stop reason: HOSPADM

## 2023-08-14 RX ORDER — HYDROMORPHONE HYDROCHLORIDE 1 MG/ML
0.1 INJECTION, SOLUTION INTRAMUSCULAR; INTRAVENOUS; SUBCUTANEOUS
Status: DISCONTINUED | OUTPATIENT
Start: 2023-08-14 | End: 2023-08-14 | Stop reason: HOSPADM

## 2023-08-14 RX ORDER — ONDANSETRON 2 MG/ML
4 INJECTION INTRAMUSCULAR; INTRAVENOUS
Status: DISCONTINUED | OUTPATIENT
Start: 2023-08-14 | End: 2023-08-14 | Stop reason: HOSPADM

## 2023-08-14 RX ORDER — MEPERIDINE HYDROCHLORIDE 25 MG/ML
12.5 INJECTION INTRAMUSCULAR; INTRAVENOUS; SUBCUTANEOUS
Status: DISCONTINUED | OUTPATIENT
Start: 2023-08-14 | End: 2023-08-14 | Stop reason: HOSPADM

## 2023-08-14 RX ORDER — HYDRALAZINE HYDROCHLORIDE 20 MG/ML
5 INJECTION INTRAMUSCULAR; INTRAVENOUS
Status: DISCONTINUED | OUTPATIENT
Start: 2023-08-14 | End: 2023-08-14 | Stop reason: HOSPADM

## 2023-08-14 RX ORDER — HALOPERIDOL 5 MG/ML
1 INJECTION INTRAMUSCULAR
Status: DISCONTINUED | OUTPATIENT
Start: 2023-08-14 | End: 2023-08-14 | Stop reason: HOSPADM

## 2023-08-14 RX ORDER — EPHEDRINE SULFATE 50 MG/ML
10 INJECTION, SOLUTION INTRAVENOUS
Status: DISCONTINUED | OUTPATIENT
Start: 2023-08-14 | End: 2023-08-14 | Stop reason: HOSPADM

## 2023-08-14 RX ORDER — DEXAMETHASONE SODIUM PHOSPHATE 4 MG/ML
INJECTION, SOLUTION INTRA-ARTICULAR; INTRALESIONAL; INTRAMUSCULAR; INTRAVENOUS; SOFT TISSUE PRN
Status: DISCONTINUED | OUTPATIENT
Start: 2023-08-14 | End: 2023-08-14 | Stop reason: SURG

## 2023-08-14 RX ORDER — TRIAMCINOLONE ACETONIDE 40 MG/ML
INJECTION, SUSPENSION INTRA-ARTICULAR; INTRAMUSCULAR
Status: DISCONTINUED
Start: 2023-08-14 | End: 2023-08-14 | Stop reason: HOSPADM

## 2023-08-14 RX ORDER — DIPHENHYDRAMINE HYDROCHLORIDE 50 MG/ML
6.25 INJECTION INTRAMUSCULAR; INTRAVENOUS
Status: DISCONTINUED | OUTPATIENT
Start: 2023-08-14 | End: 2023-08-14 | Stop reason: HOSPADM

## 2023-08-14 RX ADMIN — PROPOFOL 150 MCG/KG/MIN: 10 INJECTION, EMULSION INTRAVENOUS at 12:05

## 2023-08-14 RX ADMIN — FENTANYL CITRATE 50 MCG: 50 INJECTION, SOLUTION INTRAMUSCULAR; INTRAVENOUS at 13:11

## 2023-08-14 RX ADMIN — PROPOFOL 40 MG: 10 INJECTION, EMULSION INTRAVENOUS at 12:04

## 2023-08-14 RX ADMIN — OXYCODONE HYDROCHLORIDE 5 MG: 5 SOLUTION ORAL at 12:57

## 2023-08-14 RX ADMIN — FENTANYL CITRATE 25 MCG: 50 INJECTION, SOLUTION INTRAMUSCULAR; INTRAVENOUS at 12:52

## 2023-08-14 RX ADMIN — FENTANYL CITRATE 25 MCG: 50 INJECTION, SOLUTION INTRAMUSCULAR; INTRAVENOUS at 12:59

## 2023-08-14 RX ADMIN — DEXAMETHASONE SODIUM PHOSPHATE 10 MG: 4 INJECTION INTRA-ARTICULAR; INTRALESIONAL; INTRAMUSCULAR; INTRAVENOUS; SOFT TISSUE at 12:07

## 2023-08-14 RX ADMIN — DEXMEDETOMIDINE 25 MCG: 100 INJECTION, SOLUTION INTRAVENOUS at 12:04

## 2023-08-14 RX ADMIN — SODIUM CHLORIDE, POTASSIUM CHLORIDE, SODIUM LACTATE AND CALCIUM CHLORIDE: 600; 310; 30; 20 INJECTION, SOLUTION INTRAVENOUS at 11:51

## 2023-08-14 RX ADMIN — FENTANYL CITRATE 50 MCG: 50 INJECTION, SOLUTION INTRAMUSCULAR; INTRAVENOUS at 12:04

## 2023-08-14 RX ADMIN — ONDANSETRON 4 MG: 2 INJECTION INTRAMUSCULAR; INTRAVENOUS at 12:11

## 2023-08-14 RX ADMIN — OXYCODONE HYDROCHLORIDE 5 MG: 5 SOLUTION ORAL at 13:14

## 2023-08-14 RX ADMIN — Medication 25 MG: at 12:04

## 2023-08-14 RX ADMIN — FENTANYL CITRATE 25 MCG: 50 INJECTION, SOLUTION INTRAMUSCULAR; INTRAVENOUS at 12:19

## 2023-08-14 ASSESSMENT — PAIN DESCRIPTION - PAIN TYPE
TYPE: SURGICAL PAIN

## 2023-08-14 ASSESSMENT — PAIN SCALES - GENERAL: PAIN_LEVEL: 5

## 2023-08-14 NOTE — OR NURSING
1234- Patient arrived from OR to PACU 22. Connected to monitor and report received from anesthesia and RN. VSS. 6L 02 via nasal cannula. oral airway in place. Breaths calm, even, unlabored.     1245: Oral airway removed. Pt on 3 L O2. Pt reports pain 8/10  and no nausea.     1252: Pt medicated for pain 8/10 per mar    1257: Oral pain medications given for pain 8/10 per mar    1259:  Subsequent dose given for pain 8/10 per mar    1311: Subsequent dose for pain 7/10 per mar    1314: Subsequent dose of oral pain medication for pain 7/10 per mar.     1320: Pt on 1 L O2    1335: Pt on room air    1344:Discharge instructions provided to pt and pt's spouse at bedside.     1400- IV dc'd     1401: Pt escorted out with all personal belongings via wheelchair by RN and spouse.

## 2023-08-14 NOTE — ANESTHESIA TIME REPORT
Anesthesia Start and Stop Event Times     Date Time Event    8/14/2023 1138 Ready for Procedure     1202 Anesthesia Start     1236 Anesthesia Stop        Responsible Staff  08/14/23    Name Role Begin End    Huy Canada M.D. Anesth 1202 1236        Overtime Reason:  no overtime (within assigned shift)    Comments:

## 2023-08-14 NOTE — DISCHARGE INSTRUCTIONS
HOME CARE INSTRUCTIONS    ACTIVITY: Rest and take it easy for the first 24 hours.  A responsible adult is recommended to remain with you during that time.  It is normal to feel sleepy.  We encourage you to not do anything that requires balance, judgment or coordination.    FOR 24 HOURS DO NOT:  Drive, operate machinery or run household appliances.  Drink beer or alcoholic beverages.  Make important decisions or sign legal documents.      DIET: To avoid nausea, slowly advance diet as tolerated, avoiding spicy or greasy foods for the first day.  Add more substantial food to your diet according to your physician's instructions.  Babies can be fed formula or breast milk as soon as they are hungry.  INCREASE FLUIDS AND FIBER TO AVOID CONSTIPATION.    MEDICATIONS: Resume taking daily medication.  Take prescribed pain medication with food.  If no medication is prescribed, you may take non-aspirin pain medication if needed.  PAIN MEDICATION CAN BE VERY CONSTIPATING.  Take a stool softener or laxative such as senokot, pericolace, or milk of magnesia if needed.    Last pain medication given: Oxycodone given at 1314.     A follow-up appointment should be arranged with your doctor ; call to schedule.    You should CALL YOUR PHYSICIAN if you develop:  Fever greater than 101 degrees F.  Pain not relieved by medication, or persistent nausea or vomiting.  Excessive bleeding (blood soaking through dressing) or unexpected drainage from the wound.  Extreme redness or swelling around the incision site, drainage of pus or foul smelling drainage.  Inability to urinate or empty your bladder within 8 hours.  Problems with breathing or chest pain.    You should call 911 if you develop problems with breathing or chest pain.  If you are unable to contact your doctor or surgical center, you should go to the nearest emergency room or urgent care center.  Physician's telephone #:   Dr. Vargas [phone]    MILD FLU-LIKE SYMPTOMS ARE  NORMAL.  YOU MAY EXPERIENCE GENERALIZED MUSCLE ACHES, THROAT IRRITATION, HEADACHE AND/OR SOME NAUSEA.    If any questions arise, call your doctor.  If your doctor is not available, please feel free to call the Surgical Center at [phone].  The Center is open Monday through Friday from 7AM to 7PM.      A registered nurse may call you a few days after your surgery to see how you are doing after your procedure.    You may also receive a survey in the mail within the next two weeks and we ask that you take a few moments to complete the survey and return it to us.  Our goal is to provide you with very good care and we value your comments.     Depression / Suicide Risk    As you are discharged from this [facility] facility, it is important to learn how to keep safe from harming yourself.    Recognize the warning signs:  Abrupt changes in personality, positive or negative- including increase in energy   Giving away possessions  Change in eating patterns- significant weight changes-  positive or negative  Change in sleeping patterns- unable to sleep or sleeping all the time   Unwillingness or inability to communicate  Depression  Unusual sadness, discouragement and loneliness  Talk of wanting to die  Neglect of personal appearance   Rebelliousness- reckless behavior  Withdrawal from people/activities they love  Confusion- inability to concentrate     If you or a loved one observes any of these behaviors or has concerns about self-harm, here's what you can do:  Talk about it- your feelings and reasons for harming yourself  Remove any means that you might use to hurt yourself (examples: pills, rope, extension cords, firearm)  Get professional help from the community (Mental Health, Substance Abuse, psychological counseling)  Do not be alone:Call your Safe Contact- someone whom you trust who will be there for you.  Call your local CRISIS HOTLINE 834-9132 or 359-846-9167  Call your local Children's Mobile Crisis  Response Team [facility] (158) 256-3442 or www.[employer].Gigalocal  Call the toll free National Suicide Prevention Hotlines   National Suicide Prevention Lifeline 576-430-TNNY (7536)  [place] Line Network 800-SUICIDE (658-5300)    I acknowledge receipt and understanding of these Home Care instructions.

## 2023-08-14 NOTE — ANESTHESIA POSTPROCEDURE EVALUATION
Patient: Jett Nava    Procedure Summary     Date: 08/14/23 Room / Location: [facility] ROOM 22 / SURGERY SAME DAY [facility]    Anesthesia Start: 1202 Anesthesia Stop: 1236    Procedure: BILATERAL LARYNGOSCOPY, DIRECT, WITH INJECTION INTO VOCAL CORD(S), THERAPEUTIC; WITH OPERATIVE TELESCOPE (Mouth) Diagnosis: (Flaccid dysphonia)    Surgeons: Haley Vargas M.D. Responsible Provider: Huy Canada M.D.    Anesthesia Type: general ASA Status: 3          Final Anesthesia Type: general  Last vitals  BP   Blood Pressure : (!) 146/87    Temp   36.6 °C (97.8 °F)    Pulse   71   Resp   16    SpO2   100 %      Anesthesia Post Evaluation    Patient location during evaluation: PACU  Patient participation: complete - patient participated  Level of consciousness: awake and alert  Pain score: 5    Airway patency: patent  Anesthetic complications: no  Cardiovascular status: hemodynamically stable  Respiratory status: acceptable  Hydration status: euvolemic    PONV: none          No notable events documented.

## 2023-08-14 NOTE — OP REPORT
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    Anesthesiologist/Type of Anesthesia:  Anesthesiologist: Huy Canada M.D./General    Surgical Staff:  Circulator: Katharina Torres R.N.  Relief Circulator: Valerie Ramos R.N.  Relief Scrub: Roger Torres  Scrub Person: NADIA CorcoranNDREW    Specimens removed if any:  * No specimens in log *    Estimated Blood Loss: minimal    Findings: Bilateral vocal fold bowing    Complications: None    Procedure in detail: The patient was positively identified in the preoperative holding area and informed consent was obtained for the operation.  He was brought back to the operating room and placed in a supine position on the OR table.  General anesthesia was induced via IV and a nasal cannula was placed for oxygenation.  Dr. Canada performed a laryngoscopy and topicalized the larynx with 4% lidocaine.  Table was turned 90 degrees and a timeout procedure was performed.  He was draped in the standard fashion.  Dental guard was used to protect the upper teeth and the Marty laryngoscope was used to perform a direct laryngoscopy.  He was placed in suspension from the [place] stand.  The telescope was brought into the field and used to visualize the larynx.  Both vocal folds were moving normally but demonstrated bowing particularly anteriorly.  Prolaryn gel was used to inject both vocal folds in the bowed portion in order to medialize the anterior portion of the vocal fold.  All blood and secretions were suctioned.  Laryngoscope was removed as was the dental guard.  Oral airway was placed and he was ventilating well without additional jaw thrust.  He was returned to care of Dr. Canada.  He was taken to the PACU in stable condition.      At  the conclusion of the procedure all counts were correct.

## 2023-08-14 NOTE — DISCHARGE INSTR - OTHER INFO
Laryngoscopy Postop Instructions      Why do I need the procedure?  Laryngoscopy is the term for looking at the voice box and vocal cords in the operating room.  This can be done for a variety of reasons including lesions on the vocal cords, weakness of the vocal cords, or suspicious lesions in the throat.  This is done under general anesthesia in the operating room.    What are the down sides to having this procedure?  This involves having a long metal tube put down the mouth and into your throat.  This can cause pain in the mouth, tongue, chipped tooth, or a pinched lip.  It may affect your voice which can be different depending on the type of the procedure you are having done.  Rarely there are seriously complications from this surgery.    What do I do after surgery?  People will tolerate a soft diet more easily than things that are difficult to swallow.  You can eat whatever feels comfortable for you after surgery.    Usually pain is mild and can be treated with just ibuprofen and Tylenol over the counter.    You will want to take it easy for the first two days after surgery.   You should avoid overusing your voice for the first week after surgery.  May feel particularly tight or abnormal for the first 48 hours until the swelling improves.  Try to avoid coughing, throat clearing, whispering, or shouting to let the voice box heal.  If you feel like your voice is being strained try to talk less. You should talk at a moderate pitch and avoid trying to be loud.      Activity Restrictions  Avoid heavy lifting or strenuous activities for 1 week after surgery.      Follow-up  Please call [phone] with any questions or concerns. Be sure to ask about your pathology report at the time of follow-up.  We are unable to refill your medications on weekends or after hours.

## 2023-08-14 NOTE — OR SURGEON
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    Anesthesiologist/Type of Anesthesia:  Anesthesiologist: Huy Canada M.D./General    Surgical Staff:  Circulator: SHAD Buchanan Circulator: SHAD Philippe Scrub: Roger Torres  Scrub Person: NADIA CorcoranNDREW    Specimens removed if any:  * No specimens in log *    Estimated Blood Loss: minimal    Findings: Bilateral vocal fold bowing    Complications: None        8/14/2023 12:46 PM Haley Vargas M.D.

## 2023-08-14 NOTE — ANESTHESIA PREPROCEDURE EVALUATION
Case: [id number] Date/Time: 08/14/23 1230    Procedure: BILATERAL LARYNGOSCOPY, DIRECT, WITH INJECTION INTO VOCAL CORD(S), THERAPEUTIC; WITH OPERATIVE MICROSCOPE OR TELESCOPE    Pre-op diagnosis: R49.0    Location: [facility] ROOM 22 / SURGERY SAME DAY [facility]    Surgeons: Haley Vargas M.D.          Relevant Problems   NEURO   (positive) Complicated migraine      CARDIAC   (positive) AF (atrial fibrillation) (HCC)   (positive) Complicated migraine   (positive) Hypertension      ENDO   (positive) Hypothyroidism      Other   (positive) S/P CABG (coronary artery bypass graft)   (positive) Transient neurological symptoms       Physical Exam    Airway   Mallampati: II  TM distance: >3 FB  Neck ROM: full       Cardiovascular - normal exam  Rhythm: regular  Rate: normal  (-) murmur     Dental - normal exam           Pulmonary - normal exam  Breath sounds clear to auscultation     Abdominal    Neurological - normal exam                 Anesthesia Plan    ASA 3   ASA physical status 3 criteria: CAD/stents (> 3 months)    Plan - general       Airway plan will be natural airway          Induction: intravenous    Postoperative Plan: Postoperative administration of opioids is intended.    Pertinent diagnostic labs and testing reviewed    Informed Consent:    Anesthetic plan and risks discussed with patient.

## 2023-11-29 ENCOUNTER — PATIENT MESSAGE (OUTPATIENT)
Dept: HEALTH INFORMATION MANAGEMENT | Facility: OTHER | Age: 83
End: 2023-11-29

## 2024-01-23 ENCOUNTER — APPOINTMENT (OUTPATIENT)
Dept: ADMISSIONS | Facility: MEDICAL CENTER | Age: 84
End: 2024-01-23
Attending: OTOLARYNGOLOGY
Payer: MEDICARE

## 2024-01-31 ENCOUNTER — PRE-ADMISSION TESTING (OUTPATIENT)
Dept: ADMISSIONS | Facility: MEDICAL CENTER | Age: 84
End: 2024-01-31
Attending: OTOLARYNGOLOGY
Payer: MEDICARE

## 2024-01-31 RX ORDER — AMOXICILLIN 500 MG/1
CAPSULE ORAL
COMMUNITY
Start: 2023-11-04

## 2024-02-05 ENCOUNTER — APPOINTMENT (OUTPATIENT)
Dept: ADMISSIONS | Facility: MEDICAL CENTER | Age: 84
End: 2024-02-05
Attending: OTOLARYNGOLOGY
Payer: MEDICARE

## 2024-02-05 DIAGNOSIS — Z01.810 PRE-OPERATIVE CARDIOVASCULAR EXAMINATION: ICD-10-CM

## 2024-02-05 LAB — EKG IMPRESSION: NORMAL

## 2024-02-05 PROCEDURE — 93010 ELECTROCARDIOGRAM REPORT: CPT | Performed by: INTERNAL MEDICINE

## 2024-02-05 PROCEDURE — 93005 ELECTROCARDIOGRAM TRACING: CPT

## 2024-02-20 ENCOUNTER — ANESTHESIA EVENT (OUTPATIENT)
Dept: SURGERY | Facility: MEDICAL CENTER | Age: 84
End: 2024-02-20
Payer: MEDICARE

## 2024-02-20 ENCOUNTER — ANESTHESIA (OUTPATIENT)
Dept: SURGERY | Facility: MEDICAL CENTER | Age: 84
End: 2024-02-20
Payer: MEDICARE

## 2024-02-20 ENCOUNTER — HOSPITAL ENCOUNTER (OUTPATIENT)
Facility: MEDICAL CENTER | Age: 84
End: 2024-02-20
Attending: OTOLARYNGOLOGY | Admitting: OTOLARYNGOLOGY
Payer: MEDICARE

## 2024-02-20 VITALS
BODY MASS INDEX: 27.81 KG/M2 | TEMPERATURE: 98.3 F | HEIGHT: 71 IN | HEART RATE: 64 BPM | SYSTOLIC BLOOD PRESSURE: 148 MMHG | WEIGHT: 198.63 LBS | RESPIRATION RATE: 18 BRPM | OXYGEN SATURATION: 92 % | DIASTOLIC BLOOD PRESSURE: 70 MMHG

## 2024-02-20 PROBLEM — R49.0 DYSPHONIA: Chronic | Status: ACTIVE | Noted: 2024-02-20

## 2024-02-20 PROCEDURE — 700101 HCHG RX REV CODE 250: Performed by: ANESTHESIOLOGY

## 2024-02-20 PROCEDURE — 160035 HCHG PACU - 1ST 60 MINS PHASE I: Performed by: OTOLARYNGOLOGY

## 2024-02-20 PROCEDURE — 700111 HCHG RX REV CODE 636 W/ 250 OVERRIDE (IP): Performed by: ANESTHESIOLOGY

## 2024-02-20 PROCEDURE — 160047 HCHG PACU  - EA ADDL 30 MINS PHASE II: Performed by: OTOLARYNGOLOGY

## 2024-02-20 PROCEDURE — 160025 RECOVERY II MINUTES (STATS): Performed by: OTOLARYNGOLOGY

## 2024-02-20 PROCEDURE — 160029 HCHG SURGERY MINUTES - 1ST 30 MINS LEVEL 4: Performed by: OTOLARYNGOLOGY

## 2024-02-20 PROCEDURE — 700105 HCHG RX REV CODE 258: Performed by: ANESTHESIOLOGY

## 2024-02-20 PROCEDURE — 160046 HCHG PACU - 1ST 60 MINS PHASE II: Performed by: OTOLARYNGOLOGY

## 2024-02-20 PROCEDURE — 700111 HCHG RX REV CODE 636 W/ 250 OVERRIDE (IP): Mod: JZ | Performed by: ANESTHESIOLOGY

## 2024-02-20 PROCEDURE — 700102 HCHG RX REV CODE 250 W/ 637 OVERRIDE(OP): Performed by: ANESTHESIOLOGY

## 2024-02-20 PROCEDURE — 160002 HCHG RECOVERY MINUTES (STAT): Performed by: OTOLARYNGOLOGY

## 2024-02-20 PROCEDURE — 160009 HCHG ANES TIME/MIN: Performed by: OTOLARYNGOLOGY

## 2024-02-20 PROCEDURE — A9270 NON-COVERED ITEM OR SERVICE: HCPCS | Performed by: ANESTHESIOLOGY

## 2024-02-20 PROCEDURE — C1878 MATRL FOR VOCAL CORD: HCPCS | Performed by: OTOLARYNGOLOGY

## 2024-02-20 PROCEDURE — 160048 HCHG OR STATISTICAL LEVEL 1-5: Performed by: OTOLARYNGOLOGY

## 2024-02-20 DEVICE — KIT IMPLANT W/2 NEEDLES PROLARYN PLUS 1CC: Type: IMPLANTABLE DEVICE | Site: THROAT | Status: FUNCTIONAL

## 2024-02-20 RX ORDER — SODIUM CHLORIDE, SODIUM LACTATE, POTASSIUM CHLORIDE, CALCIUM CHLORIDE 600; 310; 30; 20 MG/100ML; MG/100ML; MG/100ML; MG/100ML
INJECTION, SOLUTION INTRAVENOUS CONTINUOUS
Status: DISCONTINUED | OUTPATIENT
Start: 2024-02-20 | End: 2024-02-20 | Stop reason: HOSPADM

## 2024-02-20 RX ORDER — LIDOCAINE HYDROCHLORIDE 10 MG/ML
INJECTION, SOLUTION EPIDURAL; INFILTRATION; INTRACAUDAL; PERINEURAL PRN
Status: DISCONTINUED | OUTPATIENT
Start: 2024-02-20 | End: 2024-02-20 | Stop reason: SURG

## 2024-02-20 RX ORDER — EPHEDRINE SULFATE 50 MG/ML
5 INJECTION, SOLUTION INTRAVENOUS
Status: DISCONTINUED | OUTPATIENT
Start: 2024-02-20 | End: 2024-02-20 | Stop reason: HOSPADM

## 2024-02-20 RX ORDER — ONDANSETRON 2 MG/ML
4 INJECTION INTRAMUSCULAR; INTRAVENOUS
Status: DISCONTINUED | OUTPATIENT
Start: 2024-02-20 | End: 2024-02-20 | Stop reason: HOSPADM

## 2024-02-20 RX ORDER — HYDRALAZINE HYDROCHLORIDE 20 MG/ML
5 INJECTION INTRAMUSCULAR; INTRAVENOUS
Status: DISCONTINUED | OUTPATIENT
Start: 2024-02-20 | End: 2024-02-20 | Stop reason: HOSPADM

## 2024-02-20 RX ORDER — OXYCODONE HCL 5 MG/5 ML
10 SOLUTION, ORAL ORAL
Status: COMPLETED | OUTPATIENT
Start: 2024-02-20 | End: 2024-02-20

## 2024-02-20 RX ORDER — ONDANSETRON 2 MG/ML
INJECTION INTRAMUSCULAR; INTRAVENOUS PRN
Status: DISCONTINUED | OUTPATIENT
Start: 2024-02-20 | End: 2024-02-20 | Stop reason: SURG

## 2024-02-20 RX ORDER — ROCURONIUM BROMIDE 10 MG/ML
INJECTION, SOLUTION INTRAVENOUS PRN
Status: DISCONTINUED | OUTPATIENT
Start: 2024-02-20 | End: 2024-02-20 | Stop reason: SURG

## 2024-02-20 RX ORDER — DEXMEDETOMIDINE HYDROCHLORIDE 100 UG/ML
INJECTION, SOLUTION INTRAVENOUS PRN
Status: DISCONTINUED | OUTPATIENT
Start: 2024-02-20 | End: 2024-02-20 | Stop reason: SURG

## 2024-02-20 RX ORDER — DEXAMETHASONE SODIUM PHOSPHATE 4 MG/ML
INJECTION, SOLUTION INTRA-ARTICULAR; INTRALESIONAL; INTRAMUSCULAR; INTRAVENOUS; SOFT TISSUE PRN
Status: DISCONTINUED | OUTPATIENT
Start: 2024-02-20 | End: 2024-02-20 | Stop reason: SURG

## 2024-02-20 RX ORDER — SODIUM CHLORIDE, SODIUM LACTATE, POTASSIUM CHLORIDE, CALCIUM CHLORIDE 600; 310; 30; 20 MG/100ML; MG/100ML; MG/100ML; MG/100ML
INJECTION, SOLUTION INTRAVENOUS
Status: DISCONTINUED | OUTPATIENT
Start: 2024-02-20 | End: 2024-02-20 | Stop reason: SURG

## 2024-02-20 RX ORDER — OXYCODONE HCL 5 MG/5 ML
5 SOLUTION, ORAL ORAL
Status: COMPLETED | OUTPATIENT
Start: 2024-02-20 | End: 2024-02-20

## 2024-02-20 RX ORDER — LIDOCAINE HYDROCHLORIDE 40 MG/ML
SOLUTION TOPICAL PRN
Status: DISCONTINUED | OUTPATIENT
Start: 2024-02-20 | End: 2024-02-20 | Stop reason: SURG

## 2024-02-20 RX ADMIN — FENTANYL CITRATE 50 MCG: 50 INJECTION, SOLUTION INTRAMUSCULAR; INTRAVENOUS at 10:33

## 2024-02-20 RX ADMIN — SODIUM CHLORIDE, POTASSIUM CHLORIDE, SODIUM LACTATE AND CALCIUM CHLORIDE: 600; 310; 30; 20 INJECTION, SOLUTION INTRAVENOUS at 10:17

## 2024-02-20 RX ADMIN — FENTANYL CITRATE 25 MCG: 50 INJECTION, SOLUTION INTRAMUSCULAR; INTRAVENOUS at 11:38

## 2024-02-20 RX ADMIN — FENTANYL CITRATE 50 MCG: 50 INJECTION, SOLUTION INTRAMUSCULAR; INTRAVENOUS at 10:21

## 2024-02-20 RX ADMIN — OXYCODONE HYDROCHLORIDE 5 MG: 5 SOLUTION ORAL at 12:01

## 2024-02-20 RX ADMIN — LIDOCAINE HYDROCHLORIDE 4 ML: 40 SOLUTION TOPICAL at 10:22

## 2024-02-20 RX ADMIN — DEXMEDETOMIDINE HYDROCHLORIDE 25 MCG: 100 INJECTION, SOLUTION INTRAVENOUS at 10:21

## 2024-02-20 RX ADMIN — FENTANYL CITRATE 50 MCG: 50 INJECTION, SOLUTION INTRAMUSCULAR; INTRAVENOUS at 11:49

## 2024-02-20 RX ADMIN — ONDANSETRON 4 MG: 2 INJECTION INTRAMUSCULAR; INTRAVENOUS at 10:39

## 2024-02-20 RX ADMIN — PROPOFOL 150 MG: 10 INJECTION, EMULSION INTRAVENOUS at 10:21

## 2024-02-20 RX ADMIN — SUGAMMADEX 200 MG: 100 INJECTION, SOLUTION INTRAVENOUS at 10:39

## 2024-02-20 RX ADMIN — DEXAMETHASONE SODIUM PHOSPHATE 4 MG: 4 INJECTION INTRA-ARTICULAR; INTRALESIONAL; INTRAMUSCULAR; INTRAVENOUS; SOFT TISSUE at 10:25

## 2024-02-20 RX ADMIN — FENTANYL CITRATE 25 MCG: 50 INJECTION, SOLUTION INTRAMUSCULAR; INTRAVENOUS at 11:42

## 2024-02-20 RX ADMIN — ROCURONIUM BROMIDE 50 MG: 50 INJECTION, SOLUTION INTRAVENOUS at 10:22

## 2024-02-20 RX ADMIN — LIDOCAINE HYDROCHLORIDE 40 MG: 10 INJECTION, SOLUTION EPIDURAL; INFILTRATION; INTRACAUDAL; PERINEURAL at 10:21

## 2024-02-20 ASSESSMENT — FIBROSIS 4 INDEX: FIB4 SCORE: 2.25

## 2024-02-20 NOTE — ANESTHESIA PREPROCEDURE EVALUATION
Case: [id number] Date/Time: 02/20/24 1015    Procedure: BILATERAL LARYNGOSCOPY, DIRECT WITH INJECTION INTO VOCAL CORD(S), THERAPEUTIC; WITH OPERATING MICROSCOPE OR TELESCOPE    Pre-op diagnosis: R49.0    Location: [facility] ROOM 23 / SURGERY SAME DAY [facility]    Surgeons: Haley Vargas M.D.            Relevant Problems   NEURO   (positive) Complicated migraine      CARDIAC   (positive) AF (atrial fibrillation) (HCC)   (positive) Complicated migraine   (positive) Hypertension      ENDO   (positive) Hypothyroidism      Other   (positive) Anxiety disorder   (positive) Dizziness   (positive) Mitral valve disorder   (positive) Other chest pain   (positive) Palpitations   (positive) S/P CABG (coronary artery bypass graft)     Anes H&P:  PAST MEDICAL HISTORY:   83 y.o. male who presents for Procedure(s):  BILATERAL LARYNGOSCOPY, DIRECT WITH INJECTION INTO VOCAL CORD(S), THERAPEUTIC; WITH OPERATING MICROSCOPE OR TELESCOPE.  He has current and past medical problems significant for:    Past Medical History:   Diagnosis Date    Arrhythmia     PVC's    Arthritis 01/31/2024    osteo/ fingers, right hip    Bowel habit changes 01/31/2024    constipation, medicated prn    Cataract 01/31/2024    gianna iol    Coronary artery disease     Disorder of thyroid 01/31/2024    on levothyroxine    Glaucoma 01/31/2024    gtts daily    Headache(784.0)     Heart valve disease 01/31/2024    mitral valve replaced    High cholesterol 01/31/2024    medicated    Migraine     Muscle disorder     7/7/2023- pt unsure what this is referring to    Psychiatric problem 01/31/2024    anxiety, no meds    TIA (transient ischemic attack) 02/2017    no residual weaknesses/problems per pt       SMOKING/ALCOHOL/RECREATIONAL DRUG USE:  Social History     Tobacco Use    Smoking status: Former     Current packs/day: 0.00     Average packs/day: 1 pack/day for 14.3 years (14.3 ttl pk-yrs)     Types: Cigarettes, Pipe     Start date: 9/1/1955     Quit date: 1/1/1970      Years since quittin.1    Smokeless tobacco: Never    Tobacco comments:     Bad idea   Vaping Use    Vaping Use: Never used   Substance Use Topics    Alcohol use: Not Currently     Comment: maybe 3-4 per week    Drug use: No     Social History     Substance and Sexual Activity   Drug Use No       PAST SURGICAL HISTORY:  Past Surgical History:   Procedure Laterality Date    KNEE ARTHROPLASTY TOTAL Right 2024    replacement    IL TOTAL HIP ARTHROPLASTY Left 2023    Procedure: LEFT ANTERIOR TOTAL HIP ARTHROPLASTY;  Surgeon: Yvon Leonard M.D.;  Location: [facility];  Service: Orthopedics    IL LARYNGOSCOPY,DIRECT,SCOPE,INJ CORDS N/A 2023    Procedure: BILATERAL LARYNGOSCOPY, DIRECT, WITH INJECTION INTO VOCAL CORD(S), THERAPEUTIC; WITH OPERATIVE TELESCOPE;  Surgeon: Haley Vargas M.D.;  Location: SURGERY SAME DAY [facility];  Service: Ent    IL REPAIR OF NASAL SEPTUM N/A 2020    Procedure: SEPTOPLASTY, NOSE;  Surgeon: Glenn Block M.D.;  Location: SURGERY SAME DAY [facility];  Service: Ent    REPAIR OR RECONSTRUCTION, NASAL VALVE N/A 2020    Procedure: REPAIR OR RECONSTRUCTION, NASAL VALVE - W/LATERAL NASAL IMPLANTS;  Surgeon: Glenn Block M.D.;  Location: SURGERY SAME DAY [facility];  Service: Ent    TURBINATE REDUCTION Bilateral 2020    Procedure: REDUCTION, NASAL TURBINATE;  Surgeon: Glenn Block M.D.;  Location: SURGERY SAME DAY [facility];  Service: Ent    KNEE ARTHROPLASTY TOTAL Left 2019    Procedure: KNEE ARTHROPLASTY TOTAL;  Surgeon: Dennis Oliveira M.D.;  Location: [facility];  Service: Orthopedics    OTHER CARDIAC SURGERY  2017    heart valve    OTHER ORTHOPEDIC SURGERY  2009    right wrist thumb    OTHER ORTHOPEDIC SURGERY  2006    left wrist/thumb    OTHER NEUROLOGICAL SURG      L5 S1 spinal fusion    CATARACT PHACO WITH IOL Bilateral     CERVICAL LAMINECTOMY POSTERIOR  1968    OPEN REDUCTION       ORIF, KNEE  2006, 2019    tibia 1957, 54    ORIF, WRIST  2010    Heart 2017    TURP-VAPOR         ALLERGIES:   No Known Allergies    MEDICATIONS:  No current facility-administered medications on file prior to encounter.     Current Outpatient Medications on File Prior to Encounter   Medication Sig Dispense Refill    aspirin EC (ECOTRIN) 81 MG Tablet Delayed Response Take 1 Tablet by mouth 2 times a day with meals. (Patient taking differently: Take 81 mg by mouth every day.) 84 Tablet 0    atorvastatin (LIPITOR) 10 MG Tab Take 1 Tablet by mouth every day.      Coenzyme Q10 (VITALINE COQ10) 300 MG Wafer Take 300 mg by mouth every day.      carvedilol (COREG) 3.125 MG Tab Take 3.125 mg by mouth at bedtime.      latanoprost (XALATAN) 0.005 % Solution latanoprost 0.005 % eye drops   INSTILL 1 DROP INTO BOTH EYES AT BEDTIME      Vibegron (GEMTESA) 75 MG Tab Take 75 mg by mouth every day at 6 PM.      Magnesium 250 MG Tab Take 1 Tablet by mouth.      Probiotic Product (ALIGN) Cap Take 4 Capsules by mouth.      Apoaequorin (PREVAGEN) 10 MG Cap Take 10 mg by mouth every day.      Brinzolamide-Brimonidine (SIMBRINZA) 1-0.2 % Suspension Simbrinza 1 %-0.2 % eye drops,suspension   INSTILL 1 DROP INTO BOTH EYES TWICE A DAY      escitalopram (LEXAPRO) 10 MG Tab Take 10 mg by mouth every bedtime.      timolol (TIMOPTIC) 0.5 % Solution Place 1 Drop in both eyes 2 times a day.      finasteride (PROSCAR) 5 MG Tab Take 5 mg by mouth every day.      Cyanocobalamin (VITAMIN B12 PO) Take 1 Tab by mouth every day.      levothyroxine (SYNTHROID) 25 MCG Tab Take 25 mcg by mouth Every morning on an empty stomach.      Biotin 2500 MCG Cap Take 1 Cap by mouth every day.         LABS:  Lab Results   Component Value Date/Time    HEMOGLOBIN 15.1 01/08/2024 2325    HEMATOCRIT 45.3 01/08/2024 2325    WBC 7.10 01/08/2024 2325     Lab Results   Component Value Date/Time    SODIUM 139 01/08/2024 2325    POTASSIUM 4.0 01/08/2024 2325    CHLORIDE 110  01/08/2024 2325    CO2 26.0 01/08/2024 2325    GLUCOSE 106 01/08/2024 2325    BUN 11.0 01/08/2024 2325    CALCIUM 8.5 (L) 01/08/2024 2325         PREVIOUS ANESTHETICS: See EMR  __________________________________________      Physical Exam    Airway   Mallampati: II  TM distance: >3 FB  Neck ROM: full       Cardiovascular - normal exam  Rhythm: regular  Rate: normal  (-) murmur     Dental - normal exam        Facial Hair   Pulmonary - normal exam  Breath sounds clear to auscultation     Abdominal   (+) obese     Neurological - normal exam                   Anesthesia Plan    ASA 3   ASA physical status 3 criteria: CAD/stents (> 3 months)    Plan - general       Airway plan will be ETT          Induction: intravenous      Pertinent diagnostic labs and testing reviewed    Informed Consent:    Anesthetic plan and risks discussed with patient.

## 2024-02-20 NOTE — OR NURSING
1045- pt arrives from OR to PACU 1, report received from RN and anesthesia. Pt place on monitor. VSS,  NAD noted. Oral airway in place.   O2 6L via mask.      1103-oral airway d/c'd    1115-Dr Vargas at bedside.  Pt updated on POC  States that she has called pt's wife     1130-pt sat upright, mild coughing noted after small sips of water.  Will wait before giving more liquids    1140-medicated per MAR for 7/10 pain    1150-medicated per MAR for continuing pain.      1155-wife called and updated on status/ POC    1200- pt able to tolerate sips of water without coughing, medicated per MAR for pain.    1215-discharge instructions given to pt and wife- verbalize understanding    1235-pt's wife assisting with dressing.     1245-pt stable to discharge.  IV and armbands removed, taken via wheelchair out to car with CNA escort.

## 2024-02-20 NOTE — DISCHARGE INSTRUCTIONS
What to Expect Post Anesthesia    Rest and take it easy for the first 24 hours.  A responsible adult is recommended to remain with you during that time.  It is normal to feel sleepy.  We encourage you to not do anything that requires balance, judgment or coordination.    FOR 24 HOURS DO NOT:  Drive, operate machinery or run household appliances.  Drink beer or alcoholic beverages.  Make important decisions or sign legal documents.    To avoid nausea, slowly advance diet as tolerated, avoiding spicy or greasy foods for the first day. SIT STRAIGHT UP WHEN EATING AND DRINKING TODAY.  Add more substantial food to your diet according to your provider's instructions.   INCREASE FLUIDS AND FIBER TO AVOID CONSTIPATION.    MILD FLU-LIKE SYMPTOMS ARE NORMAL.  YOU MAY EXPERIENCE GENERALIZED MUSCLE ACHES, THROAT IRRITATION, HEADACHE AND/OR SOME NAUSEA.    If any questions arise, call your provider.  If your provider is not available, please feel free to call the Surgical Center at [phone].    MEDICATIONS: Resume taking daily medication.  Take prescribed pain medication with food.  If no medication is prescribed, you may take non-aspirin pain medication if needed.  PAIN MEDICATION CAN BE VERY CONSTIPATING.  Take a stool softener or laxative such as senokot, pericolace, or milk of magnesia if needed.    Last pain medication given      Laryngoscopy Discharge Instructions:    General Information:    You may have temporary throat discomfort or difficulty swallowing. This is due to the surgery around your larynx (voice box) and esophagus (swallowing tube). These symptoms will gradually improve over the course of several weeks.    If a biopsy is taken, you may notice a small about of blood in you spit. Avoid coughing or hacking to clear your throat. Cool humidified air is helpful for a dry, scratchy or croupy cough.       Diet:    Avoid very hot food/ beverages, citrus, carbonated beverages.  Soft diet recommended for first  day  Cool liquids are soothing  Sit upright while eating or drinking today.    Activity:    Sit upright while eating and drinking.  Avoid coughing or hacking to clear throat.  LIMIT TALKING.     Notify Physician:    If increasing and persistent amount of blood in sputum.  If any difficulty breathing or swallowing  If temperature is greater than 101 F

## 2024-02-20 NOTE — OR SURGEON
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    Anesthesiologist/Type of Anesthesia:  Anesthesiologist: Tu Osman M.D./General    Surgical Staff:  Circulator: Katharina Torres R.N.  Scrub Person: Deepa Fofana    Specimens removed if any:  * No specimens in log *    Estimated Blood Loss: minimal    Findings: Bilateral vocal fold bowing     Complications: None        2/20/2024 10:55 AM Haley Vargas M.D.

## 2024-02-20 NOTE — OP REPORT
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    Anesthesiologist/Type of Anesthesia:  Anesthesiologist: Tu Osman M.D./General    Surgical Staff:  Circulator: Katharina Torres R.N.  Scrub Person: Deepa Fofana    Specimens removed if any:  * No specimens in log *    Estimated Blood Loss: minimal    Findings: Bilateral vocal fold bowing     Complications: None    Procedure in detail: He was positively identified in the preoperative holding area and informed consent was obtained for the operation.  He was brought back to the operating room and placed in the supine position on the OR table.  General endotracheal anesthesia was induced and he was endotracheally intubated with a small ET tube.  Table was rotated 90 degrees, he was draped in standard fashion, and a timeout procedure was performed.  Dental guard was used to protect the upper dentition.  Dedo laryngoscope was inserted and used to visualize the larynx.  He was placed in suspension from the [place] stand.  The larynx was visualized with the telescope.  Both vocal folds were injected in the anterior portions with Pro Laryn Plus.  This resulted in significant improvement of bilateral vocal fold bowing.  The larynx was suctioned.  Laryngoscope and head drape were removed.  Dental guard was removed.  He was returned to the care of Dr. Osman.  He was awakened, extubated, and taken to the recovery room in stable condition.  At the conclusion of the procedure all counts were correct.

## 2024-02-20 NOTE — ANESTHESIA PROCEDURE NOTES
Airway    Date/Time: 2/20/2024 10:23 AM    Performed by: Tu Osman M.D.  Authorized by: Tu Osman M.D.    Location:  OR  Urgency:  Elective  Difficult Airway: No    Indications for Airway Management:  Anesthesia      Spontaneous Ventilation: absent    Sedation Level:  Deep  Preoxygenated: Yes    Patient Position:  Sniffing  Mask Difficulty Assessment:  1 - vent by mask  Final Airway Type:  Endotracheal airway  Final Endotracheal Airway:  ETT  Cuffed: Yes    Technique Used for Successful ETT Placement:  Direct laryngoscopy    Insertion Site:  Oral  Blade Type:  [place]  Laryngoscope Blade/Videolaryngoscope Blade Size:  2  ETT Size (mm):  6.5  Measured from:  Teeth  ETT to Teeth (cm):  23  Placement Verified by: auscultation and capnometry    Cormack-Lehane Classification:  Grade IIb - view of arytenoids or posterior of glottis only  Number of Attempts at Approach:  1

## 2024-02-20 NOTE — DISCHARGE INSTR - OTHER INFO
Laryngoscopy Postop Instructions        Why do I need the procedure?  Laryngoscopy is the term for looking at the throat, voice box, and vocal cords in the operating room.  This can be done for a variety of reasons including lesions on the vocal cords, weakness of the vocal cords, or suspicious lesions in the throat.  This is done under general anesthesia in the operating room.    What are the down sides to having this procedure?  This involves having a long metal tube put down the mouth and into your throat.  This can cause pain in the mouth, tongue, chipped tooth, or a pinched lip.  It may affect your voice which can be different depending on the type of the procedure you are having done.  Rarely there are seriously complications from this surgery.    What do I do after surgery?  People will tolerate a soft diet more easily than things that are difficult to swallow.  Usually pain is mild and can be treated with just ibuprofen and Tylenol over the counter.    You will want to take it easy for the first two days after surgery.    Please try you use your voice sparingly for the first two days after surgery.      Activity Restrictions  Avoid heavy lifting or strenuous activities for 1 week after surgery.      Follow-up  Please call [phone] with any questions or concerns. Be sure to ask about your pathology report at the time of follow-up.  We are unable to refill your medications on weekends or after hours.

## 2024-02-20 NOTE — ANESTHESIA TIME REPORT
Anesthesia Start and Stop Event Times       Date Time Event    2/20/2024 1002 Ready for Procedure     1017 Anesthesia Start     1055 Anesthesia Stop          Responsible Staff  02/20/24      Name Role Begin End    Tu Osman M.D. Anesth 1017 1055          Overtime Reason:  no overtime (within assigned shift)    Comments:

## 2024-02-20 NOTE — ANESTHESIA POSTPROCEDURE EVALUATION
Patient: Jett Nava    Procedure Summary       Date: 02/20/24 Room / Location: [facility] ROOM 23 / SURGERY SAME DAY [facility]    Anesthesia Start: 1017 Anesthesia Stop: 1055    Procedure: BILATERAL LARYNGOSCOPY, DIRECT WITH INJECTION INTO VOCAL CORD(S), THERAPEUTIC; WITH OPERATING MICROSCOPE OR TELESCOPE (Bilateral: Throat) Diagnosis: (Flaccid dysphonia)    Surgeons: Haley Vargas M.D. Responsible Provider: Tu Osman M.D.    Anesthesia Type: general ASA Status: 3            Final Anesthesia Type: general  Last vitals  BP   Blood Pressure : 130/83    Temp   36.8 °C (98.3 °F)    Pulse   (!) 59   Resp   16    SpO2   93 %      Anesthesia Post Evaluation    Patient location during evaluation: PACU  Patient participation: complete - patient participated  Level of consciousness: sleepy but conscious    Airway patency: patent  Anesthetic complications: no  Cardiovascular status: hemodynamically stable  Respiratory status: acceptable  Hydration status: balanced    PONV: none          No notable events documented.     Nurse Pain Score: 5 (NPRS)

## 2024-10-02 PROBLEM — M65.332 TRIGGER FINGER, LEFT MIDDLE FINGER: Status: ACTIVE | Noted: 2024-10-02

## 2024-10-22 ENCOUNTER — OFFICE VISIT (OUTPATIENT)
Dept: UROLOGY | Facility: MEDICAL CENTER | Age: 84
End: 2024-10-22
Payer: MEDICARE

## 2024-10-22 DIAGNOSIS — N52.9 ED (ERECTILE DYSFUNCTION) OF ORGANIC ORIGIN: ICD-10-CM

## 2024-10-22 DIAGNOSIS — N40.1 BENIGN PROSTATIC HYPERPLASIA WITH NOCTURIA: ICD-10-CM

## 2024-10-22 DIAGNOSIS — R35.1 BENIGN PROSTATIC HYPERPLASIA WITH NOCTURIA: ICD-10-CM

## 2024-10-22 LAB
POC POST-VOID: 51 ML
POC PRE-VOID: NORMAL

## 2024-10-22 PROCEDURE — 99204 OFFICE O/P NEW MOD 45 MIN: CPT | Performed by: UROLOGY

## 2024-10-22 PROCEDURE — 51798 US URINE CAPACITY MEASURE: CPT | Performed by: UROLOGY

## 2024-10-22 RX ORDER — TESTOSTERONE 25 MG/2.5G
25 GEL TRANSDERMAL DAILY
Qty: 1 G | Refills: 4 | Status: SHIPPED | OUTPATIENT
Start: 2024-10-22 | End: 2025-07-29

## 2024-10-22 RX ORDER — TADALAFIL 10 MG/1
10 TABLET ORAL
Qty: 10 TABLET | Refills: 3 | Status: SHIPPED | OUTPATIENT
Start: 2024-10-22

## 2024-11-18 ENCOUNTER — TELEPHONE (OUTPATIENT)
Dept: UROLOGY | Facility: MEDICAL CENTER | Age: 84
End: 2024-11-18
Payer: MEDICARE

## 2024-12-02 ENCOUNTER — TELEPHONE (OUTPATIENT)
Dept: UROLOGY | Facility: MEDICAL CENTER | Age: 84
End: 2024-12-02
Payer: MEDICARE

## 2024-12-02 NOTE — TELEPHONE ENCOUNTER
----- Message from Physician Haile Gan M.D. sent at 12/2/2024  3:39 PM PST -----  Regarding: RE: Medication issues  I wrote him a prescription for testosterone gel.  Maybe the MAs can check what the issue is.  Thank you    CE  ----- Message -----  From: Korina Rome  Sent: 12/2/2024   1:07 PM PST  To: Haile Gan M.D.; [facility]  Subject: Medication issues                                Patient is having issues with his testosterone being filled at [facility]. Can we get some clarification as to what it is that they need to fill for patient.     Thank you

## 2024-12-02 NOTE — TELEPHONE ENCOUNTER
I called Pharmacy and they stated that they needed you to clarify the quantity? I have the fax and was going to give to the POW in the morning to have them fix the Rx for him. I can place on your desk to fix, or I can wait and give to POW to take care of. Let me know. DD

## 2024-12-03 DIAGNOSIS — N52.9 ED (ERECTILE DYSFUNCTION) OF ORGANIC ORIGIN: ICD-10-CM

## 2024-12-03 RX ORDER — TESTOSTERONE 25 MG/2.5G
25 GEL TRANSDERMAL DAILY
Qty: 7 G | Refills: 0 | Status: SHIPPED | OUTPATIENT
Start: 2024-12-03 | End: 2024-12-13 | Stop reason: SDUPTHER

## 2024-12-13 ENCOUNTER — TELEPHONE (OUTPATIENT)
Dept: UROLOGY | Facility: MEDICAL CENTER | Age: 84
End: 2024-12-13
Payer: MEDICARE

## 2024-12-13 DIAGNOSIS — N52.9 ED (ERECTILE DYSFUNCTION) OF ORGANIC ORIGIN: ICD-10-CM

## 2024-12-13 RX ORDER — TESTOSTERONE 25 MG/2.5G
25 GEL TRANSDERMAL DAILY
Qty: 75 G | Refills: 2 | Status: SHIPPED | OUTPATIENT
Start: 2024-12-13 | End: 2025-09-19

## 2024-12-13 NOTE — TELEPHONE ENCOUNTER
Patient came to office to inform us that pharmacy cannot dispense Testosterone rx because of QTY and day supply. I tried to give a verbal order, however; pharmacy is requesting that we send a whole new rx with correct QTY and day supply.     Rx comes as 2.5 GM per packet, max qty is 3 months, please specify.     Thank you

## 2025-01-08 NOTE — PROGRESS NOTES
Subjective  Jett Nava is a 84 y.o. male who presents today for follow up:  LUTS: PVR  51cc acceptable and stream ok. Will continue current regimen. If Gemtesa not covered by insurance then will switch to mirbegeron.   ED: Pd5i no longer working since he stopped T gel. He was not able to get his T Rx and comes in today to resolve that.  He takes cialis and accepts the risks of prostate cancer at his age with the supplemental T. He elected not to test his PSA based upon his age.      Family History   Problem Relation Age of Onset    Arthritis Mother     Genetic Disorder Mother     Cancer Father     Alcohol/Drug Father     Arthritis Brother     Genetic Disorder Brother     Alcohol/Drug Brother     Alcohol/Drug Maternal Aunt     Stroke Maternal Uncle     Alcohol/Drug Maternal Uncle     Genetic Disorder Paternal Uncle     Genetic Disorder Maternal Grandmother     Stroke Maternal Grandfather     Genetic Disorder Paternal Grandmother     Psychiatric Illness Paternal Grandmother        Social History     Socioeconomic History    Marital status:    Tobacco Use    Smoking status: Former     Current packs/day: 0.00     Average packs/day: 1 pack/day for 14.3 years (14.3 ttl pk-yrs)     Types: Cigarettes, Pipe     Start date: 1955     Quit date: 1970     Years since quittin.0    Smokeless tobacco: Never    Tobacco comments:     Bad idea   Vaping Use    Vaping status: Never Used   Substance and Sexual Activity    Alcohol use: Not Currently     Comment: maybe 3-4 per week    Drug use: No     Social Drivers of Health     Financial Resource Strain: Low Risk  (2024)    Received from [facility]    Overall Financial Resource Strain (CARDIA)     Difficulty of Paying Living Expenses: Not hard at all   Food Insecurity: No Food Insecurity (2024)    Received from [facility]    Hunger Vital Sign     Worried About Running Out of Food in the Last Year: Never true     Ran Out of Food in  the Last Year: Never true   Transportation Needs: No Transportation Needs (12/17/2024)    Received from [facility]    PRAPARE - Transportation     Lack of Transportation (Medical): No     Lack of Transportation (Non-Medical): No   Physical Activity: Insufficiently Active (12/17/2024)    Received from [facility]    Exercise Vital Sign     Days of Exercise per Week: 3 days     Minutes of Exercise per Session: 20 min   Stress: No Stress Concern Present (12/17/2024)    Received from [facility]    [employer] [place] [employer] - Occupational Stress Questionnaire     Feeling of Stress : Only a little   Social Connections: Socially Isolated (12/17/2024)    Received from [facility]    Social Connection and Isolation Panel [NHANES]     Frequency of Communication with Friends and Family: Never     Frequency of Social Gatherings with Friends and Family: Never     Attends Rastafarian Services: Never     Active Member of Clubs or Organizations: No     Attends Club or Organization Meetings: Never     Marital Status:    Intimate Partner Violence: Not At Risk (12/17/2024)    Received from [facility]    Humiliation, Afraid, Rape, and Kick questionnaire     Fear of Current or Ex-Partner: No     Emotionally Abused: No     Physically Abused: No     Sexually Abused: No   Housing Stability: Low Risk  (12/17/2024)    Received from [facility]    Housing Stability Vital Sign     Unable to Pay for Housing in the Last Year: No     Number of Times Moved in the Last Year: 1     Homeless in the Last Year: No       Past Surgical History:   Procedure Laterality Date    PB INCISE FINGER TENDON SHEATH Left 11/19/2024    Procedure: LEFT LONG FINGER TRIGGER RELEASE;  Surgeon: Crystal Sena M.D.;  Location: [facility] [place];  Service: Orthopedics    SC LARYNGOSCOPY,DIRECT,SCOPE,INJ CORDS Bilateral 2/20/2024    Procedure: BILATERAL LARYNGOSCOPY, DIRECT WITH INJECTION INTO VOCAL CORD(S),  THERAPEUTIC; WITH OPERATING MICROSCOPE OR TELESCOPE;  Surgeon: Haley Vargas M.D.;  Location: SURGERY SAME DAY [facility];  Service: Ent    KNEE ARTHROPLASTY TOTAL Right 01/31/2024    replacement    WV TOTAL HIP ARTHROPLASTY Left 09/11/2023    Procedure: LEFT ANTERIOR TOTAL HIP ARTHROPLASTY;  Surgeon: Yvon Leonard M.D.;  Location: [facility] [place];  Service: Orthopedics    WV LARYNGOSCOPY,DIRECT,SCOPE,INJ CORDS N/A 08/14/2023    Procedure: BILATERAL LARYNGOSCOPY, DIRECT, WITH INJECTION INTO VOCAL CORD(S), THERAPEUTIC; WITH OPERATIVE TELESCOPE;  Surgeon: Haley Vargas M.D.;  Location: SURGERY SAME DAY [facility];  Service: Ent    WV REPAIR OF NASAL SEPTUM N/A 06/04/2020    Procedure: SEPTOPLASTY, NOSE;  Surgeon: Glenn Block M.D.;  Location: SURGERY SAME DAY [facility];  Service: Ent    REPAIR OR RECONSTRUCTION, NASAL VALVE N/A 06/04/2020    Procedure: REPAIR OR RECONSTRUCTION, NASAL VALVE - W/LATERAL NASAL IMPLANTS;  Surgeon: Glenn Block M.D.;  Location: SURGERY SAME DAY [facility];  Service: Ent    TURBINATE REDUCTION Bilateral 06/04/2020    Procedure: REDUCTION, NASAL TURBINATE;  Surgeon: Glenn Block M.D.;  Location: SUR[facility]ERY [facility];  Service: Ent    KNEE ARTHROPLASTY TOTAL Left 01/21/2019    Procedure: KNEE ARTHROPLASTY TOTAL;  Surgeon: Dennis Oliveira M.D.;  Location: [facility];  Service: Orthopedics    OTHER CARDIAC SURGERY  2017    heart valve    OTHER ORTHOPEDIC SURGERY  2009    right wrist thumb    OTHER ORTHOPEDIC SURGERY  2006    left wrist/thumb    OTHER NEUROLOGICAL SURG  1968    L5 S1 spinal fusion    CATARACT PHACO WITH IOL Bilateral     CERVICAL LAMINECTOMY POSTERIOR  08-    OPEN REDUCTION      ORIF, KNEE  2006, 2019    tibia 1957, 54    ORIF, WRIST  2010    Heart 2017    TURP-VAPOR         Past Medical History:   Diagnosis Date    Arrhythmia     PVC's    Arthritis 01/31/2024    osteo/ fingers, right hip    Bowel habit  changes 01/31/2024    constipation, medicated prn    Cataract 01/31/2024    gianna iol    Coronary artery disease     Disorder of thyroid 01/31/2024    on levothyroxine    Glaucoma 01/31/2024    gtts daily    Headache(784.0)     Heart valve disease 01/31/2024    mitral valve replaced    High cholesterol 01/31/2024    medicated    Migraine     Muscle disorder     7/7/2023- pt unsure what this is referring to    Psychiatric problem 01/31/2024    anxiety, no meds    TIA (transient ischemic attack) 02/2017    no residual weaknesses/problems per pt       Current Outpatient Medications   Medication Sig    Testosterone 25 MG/2.5GM (1%) Gel Place 25 mg on the skin every day for 280 days. Apply contents of 1 packet to the upper arm or shoulder every morning. Dispense 30 packets per month, 2 refills.  Indications: Deficient Activity of the Testis    predniSONE (DELTASONE) 20 MG Tab 3 TABLETS FOR 3 DAYS 2 TABLETS FOR 2 DAYS 1 TABLET FOR 2 DAYS    tadalafil (CIALIS) 10 MG tablet Take 1 Tablet by mouth 1 time a day as needed for Erectile Dysfunction.    Vibegron (GEMTESA PO) Take  by mouth.    Cholecalciferol 2000 UNIT Cap Take 1 Tablet by mouth every day.    Multiple Vitamin (MULTIVITAMIN PO) Take  by mouth every day.    Non Formulary Request Take  by mouth as needed. pataday    Docusate Calcium (STOOL SOFTENER PO) Take  by mouth as needed.    aspirin EC (ECOTRIN) 81 MG Tablet Delayed Response Take 1 Tablet by mouth 2 times a day with meals. (Patient taking differently: Take 81 mg by mouth every day.)    atorvastatin (LIPITOR) 10 MG Tab Take 1 Tablet by mouth every day.    Coenzyme Q10 (VITALINE COQ10) 300 MG Wafer Take 300 mg by mouth every day.    carvedilol (COREG) 3.125 MG Tab Take 3.125 mg by mouth at bedtime.    latanoprost (XALATAN) 0.005 % Solution latanoprost 0.005 % eye drops   INSTILL 1 DROP INTO BOTH EYES AT BEDTIME    Magnesium 250 MG Tab Take 1 Tablet by mouth.    Probiotic Product (ALIGN) Cap Take 4 Capsules by  "mouth.    Apoaequorin (PREVAGEN) 10 MG Cap Take 10 mg by mouth every day.    Brinzolamide-Brimonidine (SIMBRINZA) 1-0.2 % Suspension Simbrinza 1 %-0.2 % eye drops,suspension   INSTILL 1 DROP INTO BOTH EYES TWICE A DAY    escitalopram (LEXAPRO) 10 MG Tab Take 10 mg by mouth every bedtime.    timolol (TIMOPTIC) 0.5 % Solution Place 1 Drop in both eyes 2 times a day.    finasteride (PROSCAR) 5 MG Tab Take 5 mg by mouth every day.    Cyanocobalamin (VITAMIN B12 PO) Take 1 Tab by mouth every day.    levothyroxine (SYNTHROID) 25 MCG Tab Take 25 mcg by mouth Every morning on an empty stomach.    Biotin 2500 MCG Cap Take 1 Cap by mouth every day.       No Known Allergies    Objective  There were no vitals taken for this visit.  Physical Exam      Labs:     POC UA  No results found for: \"POCCOLOR\", \"POCAPPEAR\", \"POCLEUKEST\", \"POCNITRITE\", \"POCUROBILIGE\", \"POCPROTEIN\", \"POCURPH\", \"POCBLOOD\", \"POCSPGRV\", \"POCKETONES\", \"POCBILIRUBIN\", \"POCGLUCUA\"     CMP  Lab Results   Component Value Date/Time    SODIUM 142 12/17/2024 2359    POTASSIUM 4.2 12/17/2024 2359    CHLORIDE 109 12/17/2024 2359    CO2 28.0 12/17/2024 2359    ANION 5.0 12/17/2024 2359    GLUCOSE 100 12/17/2024 2359    BUN 19.0 12/17/2024 2359    CREATININE 1.00 12/17/2024 2359    CALCIUM 9.1 12/17/2024 2359    ASTSGOT 12 12/17/2024 2359    ALTSGPT 14 12/17/2024 2359    ALKPHOSPHAT 80 12/17/2024 2359    TBILIRUBIN 1.0 12/17/2024 2359    ALBUMIN 3.6 12/17/2024 2359    TOTPROTEIN 5.7 (L) 12/17/2024 2359    GLOBULIN 2.7 11/03/2020 0725    AGRATIO 1.7 12/17/2024 2359       BMP  Lab Results   Component Value Date/Time    SODIUM 142 12/17/2024 2359    POTASSIUM 4.2 12/17/2024 2359    CHLORIDE 109 12/17/2024 2359    CO2 28.0 12/17/2024 2359    GLUCOSE 100 12/17/2024 2359    BUN 19.0 12/17/2024 2359    CREATININE 1.00 12/17/2024 2359    CALCIUM 9.1 12/17/2024 2359       CBC  Lab Results   Component Value Date/Time    WBC 8.20 12/17/2024 2359    RBC 4.51 12/17/2024 2359    " HEMOGLOBIN 14.7 12/17/2024 2359    HEMATOCRIT 43.1 12/17/2024 2359    MCV 95.7 (H) 12/17/2024 2359    MCH 32.6 12/17/2024 2359    MCHC 34.1 12/17/2024 2359    RDW 13.6 12/17/2024 2359    MPV 8.0 12/17/2024 2359    LYMPHOCYTES 19.0 (L) 12/17/2024 2359    LYMPHS 1.5 12/17/2024 2359    MONOCYTES 9.0 12/17/2024 2359    MONOS 0.7 12/17/2024 2359    EOSINOPHILS 2.2 12/17/2024 2359    EOS 0.2 12/17/2024 2359    BASOPHILS 0.7 12/17/2024 2359    BASO 0.1 12/17/2024 2359    NRBC 0.00 11/02/2020 1258       A1C  Lab Results   Component Value Date/Time    HBA1C 5.5 11/02/2020 1720    AVGLUC 111 11/02/2020 1720       Imaging:       Assessment & Plan    LUTS: Stable and doing fine      ED: T Rx'ed and will check levels in 3 months.     RTC 3 months    I spent 25 minutes in chart reivew and care plan.     Haile Gan M.D., F.A.C.S.  Urologic Oncology  Vice-Chair, Department of Surgery  [facility]

## 2025-01-09 ENCOUNTER — OFFICE VISIT (OUTPATIENT)
Dept: UROLOGY | Facility: MEDICAL CENTER | Age: 85
End: 2025-01-09
Payer: MEDICARE

## 2025-01-09 DIAGNOSIS — N52.9 ED (ERECTILE DYSFUNCTION) OF ORGANIC ORIGIN: ICD-10-CM

## 2025-01-09 PROCEDURE — 99214 OFFICE O/P EST MOD 30 MIN: CPT | Performed by: UROLOGY

## 2025-01-09 RX ORDER — TESTOSTERONE 25 MG/2.5G
25 GEL TRANSDERMAL DAILY
Qty: 1 G | Refills: 2 | Status: SHIPPED | OUTPATIENT
Start: 2025-01-09 | End: 2025-05-09

## 2025-04-24 ENCOUNTER — OFFICE VISIT (OUTPATIENT)
Dept: UROLOGY | Facility: MEDICAL CENTER | Age: 85
End: 2025-04-24
Payer: MEDICARE

## 2025-04-24 DIAGNOSIS — N52.9 ED (ERECTILE DYSFUNCTION) OF ORGANIC ORIGIN: ICD-10-CM

## 2025-04-24 PROCEDURE — 99214 OFFICE O/P EST MOD 30 MIN: CPT | Performed by: UROLOGY

## 2025-04-24 NOTE — PROGRESS NOTES
Subjective  Jett Nava is a 84 y.o. male who presents today for follow up LUTS and ED.  LUTS: Stable and doing fine. On Gemtesa from Urologist in [place].     ED: T Rx'ed but he never got it likely secondary to insurance coverage. Not getting erections with Cialis, likely due to low T.         Family History   Problem Relation Age of Onset    Arthritis Mother     Genetic Disorder Mother     Cancer Father     Alcohol/Drug Father     Arthritis Brother     Genetic Disorder Brother     Alcohol/Drug Brother     Alcohol/Drug Maternal Aunt     Stroke Maternal Uncle     Alcohol/Drug Maternal Uncle     Genetic Disorder Paternal Uncle     Genetic Disorder Maternal Grandmother     Stroke Maternal Grandfather     Genetic Disorder Paternal Grandmother     Psychiatric Illness Paternal Grandmother        Social History     Socioeconomic History    Marital status:    Tobacco Use    Smoking status: Former     Current packs/day: 0.00     Average packs/day: 1 pack/day for 14.3 years (14.3 ttl pk-yrs)     Types: Cigarettes, Pipe     Start date: 1955     Quit date: 1970     Years since quittin.3    Smokeless tobacco: Never    Tobacco comments:     Bad idea   Vaping Use    Vaping status: Never Used   Substance and Sexual Activity    Alcohol use: Not Currently     Comment: maybe 3-4 per week    Drug use: No     Social Drivers of Health     Financial Resource Strain: Low Risk  (2024)    Received from [facility]    Overall Financial Resource Strain (CARDIA)     Difficulty of Paying Living Expenses: Not hard at all   Food Insecurity: No Food Insecurity (2024)    Received from [facility]    Hunger Vital Sign     Worried About Running Out of Food in the Last Year: Never true     Ran Out of Food in the Last Year: Never true   Transportation Needs: No Transportation Needs (2024)    Received from [facility]    PRAPARE - Transportation     Lack of Transportation (Medical): No      Lack of Transportation (Non-Medical): No   Physical Activity: Insufficiently Active (12/17/2024)    Received from [facility]    Exercise Vital Sign     Days of Exercise per Week: 3 days     Minutes of Exercise per Session: 20 min   Stress: No Stress Concern Present (12/17/2024)    Received from [facility]    [employer] [place] [employer] - Occupational Stress Questionnaire     Feeling of Stress : Only a little   Social Connections: Socially Isolated (12/17/2024)    Received from [facility]    Social Connection and Isolation Panel [NHANES]     Frequency of Communication with Friends and Family: Never     Frequency of Social Gatherings with Friends and Family: Never     Attends Tenriism Services: Never     Active Member of Clubs or Organizations: No     Attends Club or Organization Meetings: Never     Marital Status:    Intimate Partner Violence: Not At Risk (12/17/2024)    Received from [facility]    Humiliation, Afraid, Rape, and Kick questionnaire     Fear of Current or Ex-Partner: No     Emotionally Abused: No     Physically Abused: No     Sexually Abused: No   Housing Stability: Low Risk  (12/17/2024)    Received from [facility]    Housing Stability Vital Sign     Unable to Pay for Housing in the Last Year: No     Number of Times Moved in the Last Year: 1     Homeless in the Last Year: No       Past Surgical History:   Procedure Laterality Date    PB INCISE FINGER TENDON SHEATH Left 11/19/2024    Procedure: LEFT LONG FINGER TRIGGER RELEASE;  Surgeon: Crystal Sena M.D.;  Location: [facility] [place];  Service: Orthopedics    MI LARYNGOSCOPY,DIRECT,SCOPE,INJ CORDS Bilateral 2/20/2024    Procedure: BILATERAL LARYNGOSCOPY, DIRECT WITH INJECTION INTO VOCAL CORD(S), THERAPEUTIC; WITH OPERATING MICROSCOPE OR TELESCOPE;  Surgeon: Haley Vargas M.D.;  Location: SURGERY SAME DAY [facility];  Service: Ent    KNEE ARTHROPLASTY TOTAL Right 01/31/2024     replacement    OH TOTAL HIP ARTHROPLASTY Left 09/11/2023    Procedure: LEFT ANTERIOR TOTAL HIP ARTHROPLASTY;  Surgeon: Yvon Leonard M.D.;  Location: [facility] [place];  Service: Orthopedics    OH LARYNGOSCOPY,DIRECT,SCOPE,INJ CORDS N/A 08/14/2023    Procedure: BILATERAL LARYNGOSCOPY, DIRECT, WITH INJECTION INTO VOCAL CORD(S), THERAPEUTIC; WITH OPERATIVE TELESCOPE;  Surgeon: Haley Vargas M.D.;  Location: SURGERY SAME DAY [facility];  Service: Ent    OH REPAIR OF NASAL SEPTUM N/A 06/04/2020    Procedure: SEPTOPLASTY, NOSE;  Surgeon: Glenn Block M.D.;  Location: SURGERY SAME DAY [facility];  Service: Ent    REPAIR OR RECONSTRUCTION, NASAL VALVE N/A 06/04/2020    Procedure: REPAIR OR RECONSTRUCTION, NASAL VALVE - W/LATERAL NASAL IMPLANTS;  Surgeon: Glenn Block M.D.;  Location: SURGERY SAME DAY [facility];  Service: Ent    TURBINATE REDUCTION Bilateral 06/04/2020    Procedure: REDUCTION, NASAL TURBINATE;  Surgeon: Glenn Block M.D.;  Location: SURGERY SAME DAY [facility];  Service: Ent    KNEE ARTHROPLASTY TOTAL Left 01/21/2019    Procedure: KNEE ARTHROPLASTY TOTAL;  Surgeon: Dennis Oliveira M.D.;  Location: SURGERY [facility];  Service: Orthopedics    OTHER CARDIAC SURGERY  2017    heart valve    OTHER ORTHOPEDIC SURGERY  2009    right wrist thumb    OTHER ORTHOPEDIC SURGERY  2006    left wrist/thumb    OTHER NEUROLOGICAL SURG  1968    L5 S1 spinal fusion    CATARACT PHACO WITH IOL Bilateral     CERVICAL LAMINECTOMY POSTERIOR  08-    OPEN REDUCTION      ORIF, KNEE  2006, 2019    tibia 1957, 54    ORIF, WRIST  2010    Heart 2017    TURP-VAPOR         Past Medical History:   Diagnosis Date    Arrhythmia     PVC's    Arthritis 01/31/2024    osteo/ fingers, right hip    Bowel habit changes 01/31/2024    constipation, medicated prn    Cataract 01/31/2024    gianna iol    Coronary artery disease     Disorder of thyroid 01/31/2024    on levothyroxine    Glaucoma 01/31/2024     gtts daily    Headache(784.0)     Heart valve disease 01/31/2024    mitral valve replaced    High cholesterol 01/31/2024    medicated    Migraine     Muscle disorder     7/7/2023- pt unsure what this is referring to    Psychiatric problem 01/31/2024    anxiety, no meds    TIA (transient ischemic attack) 02/2017    no residual weaknesses/problems per pt       Current Outpatient Medications   Medication Sig    Testosterone 25 MG/2.5GM (1%) Gel Place 25 mg on the skin every day for 120 days.    predniSONE (DELTASONE) 20 MG Tab 3 TABLETS FOR 3 DAYS 2 TABLETS FOR 2 DAYS 1 TABLET FOR 2 DAYS    tadalafil (CIALIS) 10 MG tablet Take 1 Tablet by mouth 1 time a day as needed for Erectile Dysfunction.    Vibegron (GEMTESA PO) Take  by mouth.    Cholecalciferol 2000 UNIT Cap Take 1 Tablet by mouth every day.    Multiple Vitamin (MULTIVITAMIN PO) Take  by mouth every day.    Non Formulary Request Take  by mouth as needed. pataday    Docusate Calcium (STOOL SOFTENER PO) Take  by mouth as needed.    aspirin EC (ECOTRIN) 81 MG Tablet Delayed Response Take 1 Tablet by mouth 2 times a day with meals. (Patient taking differently: Take 81 mg by mouth every day.)    atorvastatin (LIPITOR) 10 MG Tab Take 1 Tablet by mouth every day.    Coenzyme Q10 (VITALINE COQ10) 300 MG Wafer Take 300 mg by mouth every day.    carvedilol (COREG) 3.125 MG Tab Take 3.125 mg by mouth at bedtime.    latanoprost (XALATAN) 0.005 % Solution latanoprost 0.005 % eye drops   INSTILL 1 DROP INTO BOTH EYES AT BEDTIME    Magnesium 250 MG Tab Take 1 Tablet by mouth.    Probiotic Product (ALIGN) Cap Take 4 Capsules by mouth.    Apoaequorin (PREVAGEN) 10 MG Cap Take 10 mg by mouth every day.    Brinzolamide-Brimonidine (SIMBRINZA) 1-0.2 % Suspension Simbrinza 1 %-0.2 % eye drops,suspension   INSTILL 1 DROP INTO BOTH EYES TWICE A DAY    escitalopram (LEXAPRO) 10 MG Tab Take 10 mg by mouth every bedtime.    timolol (TIMOPTIC) 0.5 % Solution Place 1 Drop in both eyes 2  times a day.    finasteride (PROSCAR) 5 MG Tab Take 5 mg by mouth every day.    Cyanocobalamin (VITAMIN B12 PO) Take 1 Tab by mouth every day.    levothyroxine (SYNTHROID) 25 MCG Tab Take 25 mcg by mouth Every morning on an empty stomach.    Biotin 2500 MCG Cap Take 1 Cap by mouth every day.       No Known Allergies    Objective  There were no vitals taken for this visit.  Physical Exam  Constitutional:       Appearance: Normal appearance. He is normal weight.   Neurological:      Mental Status: He is alert.           Labs:       CMP  Lab Results   Component Value Date/Time    SODIUM 142 12/17/2024 2359    POTASSIUM 4.2 12/17/2024 2359    CHLORIDE 109 12/17/2024 2359    CO2 28.0 12/17/2024 2359    ANION 5.0 12/17/2024 2359    GLUCOSE 100 12/17/2024 2359    BUN 19.0 12/17/2024 2359    CREATININE 1.00 12/17/2024 2359    CALCIUM 9.1 12/17/2024 2359    ASTSGOT 12 12/17/2024 2359    ALTSGPT 14 12/17/2024 2359    ALKPHOSPHAT 80 12/17/2024 2359    TBILIRUBIN 1.0 12/17/2024 2359    ALBUMIN 3.6 12/17/2024 2359    TOTPROTEIN 5.7 (L) 12/17/2024 2359    GLOBULIN 2.7 11/03/2020 0725    AGRATIO 1.7 12/17/2024 2359       BMP  Lab Results   Component Value Date/Time    SODIUM 142 12/17/2024 2359    POTASSIUM 4.2 12/17/2024 2359    CHLORIDE 109 12/17/2024 2359    CO2 28.0 12/17/2024 2359    GLUCOSE 100 12/17/2024 2359    BUN 19.0 12/17/2024 2359    CREATININE 1.00 12/17/2024 2359    CALCIUM 9.1 12/17/2024 2359       CBC  Lab Results   Component Value Date/Time    WBC 8.20 12/17/2024 2359    RBC 4.51 12/17/2024 2359    HEMOGLOBIN 14.7 12/17/2024 2359    HEMATOCRIT 43.1 12/17/2024 2359    MCV 95.7 (H) 12/17/2024 2359    MCH 32.6 12/17/2024 2359    MCHC 34.1 12/17/2024 2359    RDW 13.6 12/17/2024 2359    MPV 8.0 12/17/2024 2359    LYMPHOCYTES 19.0 (L) 12/17/2024 2359    LYMPHS 1.5 12/17/2024 2359    MONOCYTES 9.0 12/17/2024 2359    MONOS 0.7 12/17/2024 2359    EOSINOPHILS 2.2 12/17/2024 2359    EOS 0.2 12/17/2024 2359    BASOPHILS 0.7  12/17/2024 2359    BASO 0.1 12/17/2024 2359    NRBC 0.00 11/02/2020 1258       A1C  Lab Results   Component Value Date/Time    HBA1C 5.5 11/02/2020 1720    AVGLUC 111 11/02/2020 1720         Assessment & Plan  LUTS: stable on Gemtesa.  ED: No responding and low T. He would like to see Dr. Platt and discuss forms of T and what might be covered by insurance for him.      Haile Gan M.D., F.A.C.S.  Urologic Oncology  Vice-Chair, Department of Surgery  [facility]

## 2025-05-19 ENCOUNTER — OFFICE VISIT (OUTPATIENT)
Dept: UROLOGY | Facility: MEDICAL CENTER | Age: 85
End: 2025-05-19
Payer: MEDICARE

## 2025-05-19 DIAGNOSIS — N52.9 ED (ERECTILE DYSFUNCTION) OF ORGANIC ORIGIN: Primary | ICD-10-CM

## 2025-05-19 DIAGNOSIS — E29.1 HYPOGONADISM IN MALE: ICD-10-CM

## 2025-05-19 PROCEDURE — 99214 OFFICE O/P EST MOD 30 MIN: CPT | Performed by: UROLOGY

## 2025-05-19 NOTE — PROGRESS NOTES
Chief Complaint: Erectile dysfunction    HPI: Jett Nava is a pleasant 84 y.o. male with a history of CAD s/p CABG in 2017, atrial fibrillation, hypertension, and hyperlipidemia, BPH managed medically, and here today to discuss erectile dysfunction.     He has a past history of using tadalafil for ED, but has not had a response for the last 2 years. He does not notice any nocturnal/early AM erections or fullness. He has a normal libido, but has not been able to have penetrative intercourse over the last two years.    He also has a past history of hypogonadism treated with TRT. He used TRT for about 6 years, but stopped a few years ago due to a concern regarding his sleep. The last labs we have to evaluate his hormonal function was from 2018 when he was still on TRT. As stated above, he has a normal libido, but he does report some other symptoms consistent with possible hypogonadism including decreased energy and motivation, and decreased exercise tolerance. This has been particularly notable for him when he's at a higher elevation.     Etiology of ED (check all that apply): Vascular  Prostate cancer treatment: n/a  ED Year of onset: 2023  Treatments tried (all that apply): PDE5 inhibitors  Curvature of erections:no  Previously implanted/explanted? no  Libido: Normal    NADYA: 5 (severe ED)    Past Medical History:  Past Medical History[1]    Past Surgical History:  Past Surgical History[2]    Family History:  Family History   Problem Relation Age of Onset    Arthritis Mother     Genetic Disorder Mother     Cancer Father     Alcohol/Drug Father     Arthritis Brother     Genetic Disorder Brother     Alcohol/Drug Brother     Alcohol/Drug Maternal Aunt     Stroke Maternal Uncle     Alcohol/Drug Maternal Uncle     Genetic Disorder Paternal Uncle     Genetic Disorder Maternal Grandmother     Stroke Maternal Grandfather     Genetic Disorder Paternal Grandmother     Psychiatric Illness Paternal Grandmother         Social History:  Social History     Socioeconomic History    Marital status:      Spouse name: Not on file    Number of children: Not on file    Years of education: Not on file    Highest education level: Not on file   Occupational History    Not on file   Tobacco Use    Smoking status: Former     Current packs/day: 0.00     Average packs/day: 1 pack/day for 14.3 years (14.3 ttl pk-yrs)     Types: Cigarettes, Pipe     Start date: 1955     Quit date: 1970     Years since quittin.4    Smokeless tobacco: Never    Tobacco comments:     Bad idea   Vaping Use    Vaping status: Never Used   Substance and Sexual Activity    Alcohol use: Not Currently     Comment: maybe 3-4 per week    Drug use: No    Sexual activity: Not on file   Other Topics Concern    Not on file   Social History Narrative    Not on file     Social Drivers of Health     Financial Resource Strain: Low Risk  (2024)    Received from [facility]    Overall Financial Resource Strain (CARDIA)     Difficulty of Paying Living Expenses: Not hard at all   Food Insecurity: No Food Insecurity (2024)    Received from [facility] [address] [facility]    Hunger Vital Sign     Worried About Running Out of Food in the Last Year: Never true     Ran Out of Food in the Last Year: Never true   Transportation Needs: No Transportation Needs (2024)    Received from [facility]    PRAPARE - Transportation     Lack of Transportation (Medical): No     Lack of Transportation (Non-Medical): No   Physical Activity: Insufficiently Active (2024)    Received from [facility]    Exercise Vital Sign     Days of Exercise per Week: 3 days     Minutes of Exercise per Session: 20 min   Stress: No Stress Concern Present (2024)    Received from [facility]    [employer] - Occupational Stress Questionnaire     Feeling of Stress : Only a little   Social Connections: Socially Isolated (2024)    Received  from [facility]    Social Connection and Isolation Panel [NHANES]     Frequency of Communication with Friends and Family: Never     Frequency of Social Gatherings with Friends and Family: Never     Attends Taoism Services: Never     Active Member of Clubs or Organizations: No     Attends Club or Organization Meetings: Never     Marital Status:    Intimate Partner Violence: Not At Risk (12/17/2024)    Received from [facility]    Humiliation, Afraid, Rape, and Kick questionnaire     Fear of Current or Ex-Partner: No     Emotionally Abused: No     Physically Abused: No     Sexually Abused: No   Housing Stability: Low Risk  (12/17/2024)    Received from [facility]    Housing Stability Vital Sign     Unable to Pay for Housing in the Last Year: No     Number of Times Moved in the Last Year: 1     Homeless in the Last Year: No       Medications:  Current Medications[3]    Allergies:  Allergies[4]    Review of Systems:  Constitutional: Negative for fever, chills and malaise/fatigue.   HENT: Negative for congestion.    Eyes: Negative for pain.   Respiratory: Negative for cough and shortness of breath.    Cardiovascular: Negative for leg swelling.   Gastrointestinal: Negative for nausea, vomiting, abdominal pain and diarrhea.   Genitourinary: Negative for dysuria and hematuria.   Skin: Negative for rash.   Neurological: Negative for dizziness, focal weakness and headaches.   Endo/Heme/Allergies: Does not bruise/bleed easily.   Psychiatric/Behavioral: Negative for depression.  The patient is not nervous/anxious.        Physical Exam:  There were no vitals filed for this visit.    GENERAL: well appearing, well nourished, NAD  RESP: respiratory effort normal  SKIN/LYMPH: normal coloration and turgor, no suspicious skin lesions noted  NEURO/PSYCH: alert, oriented, normal speech, no focal findings or movement disorder noted  EXTREMITIES: no pedal edema noted    Data Review:    Labs:  POCT UA No results  "found for: \"POCCOLOR\", \"POCAPPEAR\", \"POCLEUKEST\", \"POCNITRITE\", \"POCUROBILIGE\", \"POCPROTEIN\", \"POCURPH\", \"POCBLOOD\", \"POCSPGRV\", \"POCKETONES\", \"POCBILIRUBIN\", \"POCGLUCUA\"   CBC   Lab Results   Component Value Date/Time    WBC 8.20 12/17/2024 2359    RBC 4.51 12/17/2024 2359    HEMOGLOBIN 14.7 12/17/2024 2359    HEMATOCRIT 43.1 12/17/2024 2359    MCV 95.7 (H) 12/17/2024 2359    MCH 32.6 12/17/2024 2359    MCHC 34.1 12/17/2024 2359    RDW 13.6 12/17/2024 2359    MPV 8.0 12/17/2024 2359    LYMPHOCYTES 19.0 (L) 12/17/2024 2359    LYMPHS 1.5 12/17/2024 2359    MONOCYTES 9.0 12/17/2024 2359    MONOS 0.7 12/17/2024 2359    EOSINOPHILS 2.2 12/17/2024 2359    EOS 0.2 12/17/2024 2359    BASOPHILS 0.7 12/17/2024 2359    BASO 0.1 12/17/2024 2359    NRBC 0.00 11/02/2020 1258       CMP   Lab Results   Component Value Date/Time    SODIUM 142 12/17/2024 2359    POTASSIUM 4.2 12/17/2024 2359    CHLORIDE 109 12/17/2024 2359    CO2 28.0 12/17/2024 2359    ANION 5.0 12/17/2024 2359    GLUCOSE 100 12/17/2024 2359    BUN 19.0 12/17/2024 2359    CREATININE 1.00 12/17/2024 2359    CALCIUM 9.1 12/17/2024 2359    ASTSGOT 12 12/17/2024 2359    ALTSGPT 14 12/17/2024 2359    ALKPHOSPHAT 80 12/17/2024 2359    TBILIRUBIN 1.0 12/17/2024 2359    ALBUMIN 3.6 12/17/2024 2359    TOTPROTEIN 5.7 (L) 12/17/2024 2359    GLOBULIN 2.7 11/03/2020 0725    AGRATIO 1.7 12/17/2024 2359     INFERTILITY   Lab Results   Component Value Date/Time    TESTOSTERONE 680 03/17/2018 0821    FREETESTOST 74 03/17/2018 0821    SEXHORM 80 03/17/2018 0821     PSA No results found for: \"PSATOTAL\"      Assessment: 84 y.o. male with a history of hypogonadism previously treated with topical TRT, and progressive erectile dysfunction without response to use of tadalafil over the last two years.     I discussed with the patient the diagnosis of erectile dysfunction and how it may relate to his overall health. I counseled the patient that ED can be a marker of underlying " cardiovascular disease and encouraged close follow up with his primary care physician. Likewise, I explained that lifestyle modifications including improved diet and increased exercise can improve not only his overall health but also potentially his erectile function.    We also discussed management options beyond lifestyle modification. I explained the use of a vacuum erection device (LETICIA) including its appropriate use. An example of a quality LETICIA can be seen at the link below:    https://[email or web address]/fpp-fkwclqffb-[email or web address]-cara-[email or web address]-twist-and-lock/    We discussed the use of oral phosphodiesterase inhibitors, including sildenafil, tadalafil, and vardenafil. I explained how to appropriately take these medications for maximum effect, as well as potential side effects. Sildenafil should be taken on an empty stomach about an hour before sexual activity, and side effects may include headache, GI upset, vision changes including a blue-tinge, and facial flushing. Tadalafil may be taken up to 24 hours prior to sexual activity but has its peak effect within the first few hours and should be taken about 30 minutes prior to sexual activity; it can be taken with or without food. Side effects can also include headache and indigestion, as well as back pain and myalgias. Vardenafil is a short acting PDE5 inhibitor and should be taken 30-60 minutes prior to expected intercourse. Side effects may include headache, flushing, visual disturbances, and dyspepsia.     We also discussed more invasive methods of treatment, including intraurethral alprostadil and intracavernosal injection therapy (ICI). I explained to the patient that if he would like to consider either of these options, we would need to arrange for an in-office test dose. With both medications we discussed the possible side effects of penile pain and priapism (more information below).    Finally, we discussed surgical therapy with implantation of a penile prosthesis. We  discussed both malleable and inflatable penile prosthesis, including the nature of the surgery, expected outcomes, and the risks of surgery including infection, erosion, urethral injury, and device malfunction (see more info below).    Intracavernosal Injections    This handout explains what intracavernosal injections are and how they are used to help men achieve erections suitable for sex.                   What are intracavernosal injections and how do they work?    Intracavernosal injections are a way for men with more severe erectile dysfunction to achieve an erection suitable for sex.    Medications that are designed to manage erectile dysfunction work by prompting the penile arteries to dilate. This allows for sufficient blood flow into the corpora cavernosa to provide an erection. Unfortunately, oral medications like sildenafil (Viagra) and tadalafil (Cialis) do not work for everyone, can have intolerable side effects, or interact with other medications such that they must be avoided.      Intracavernosal injections contain medications that are similar to oral medications in function, but are much more potent.  There are many different formulations of intracavernosal injections, each with different names. Some common names include Bimix, Trimix, and Quadmix. Although they are usually not covered by insurance, they are usually quite affordable from compounding pharmacies.    What are the benefits of intracavernosal injections?    Intracavernosal injections are a great option for men with who wish to regain sexual function but do not desire (or are not candidates for) a more lasting solution like the penile implant.     This means they can be a great option for men too ill to undergo medical procedures or for men who are still in the first few months of recovery following prostate removal for prostate cancer.    Intracavernosal injections are also more potent than oral medications like sildenafil (Viagra) and  tadalafil (Cialis), which means that they can work for men who have failed those options. They also do not have the same systemic side effects like headaches, muscle aches, visual changes or heartburn.    What are the drawbacks of intracavernosal injections?    Although intracavernosal injections are generally considered to be safe, it is important to be aware of potential side effects.     Intracavernosal injections must be performed on an as-needed basis. This means that men must perform a penile injection every time they wish to achieve an erection. They also must be kept in the refrigerator as the medication quickly deteriorates at room temperature. These two facts mean that injections are not very spontaneous, which can be a challenge for many couples.     Although it takes some time to develop, men who use injections for several months or longer are at risk for developing scar tissue in the penis known as fibrosis. This can make the injections more difficult to perform. In some cases, men can even develop Peyronie's Disease and penile curvature as the result of their injections.    It's important to remember that injections are not a cure for erectile dysfunction and do not stop the progressive loss of penile length and girth cause by it. On average, men with significant erectile dysfunction will lose about 1 inch of penile length each year. The only solution that stops this ongoing loss and allows men to recover their some of their size is the penile implant.    Some men may notice a slight burning with their injection. This may depend on the type of medication being used and can occasionally persist for several minutes. Men should also be careful to only inject along the side of the penile shaft, as the urine tube (the urethra) runs at the 6 o'clock position and the nerves that supply the head of the penis run at the 12 o'clock position. Detailed instructions on injection technique are provided  below.    Occasionally, the injections may work too well and cause a prolonged erection that can be painful known as a priapism. Although uncommon, this is defined as an erection lasting longer than 4 hours and is considered to be a medical emergency.    Generally, we recommend that patients seek care well before the 4-hour dre. Men experiencing a prolonged erection lasting 1 full hour should take 120 mg of over-the-counter non-extended release pseudoephedrine hydrochloride (aka Sudafed). Men starting injection therapy should keep a supply of this medication on-hand just in case. If the erection has not resolved in 30 minutes (1.5 hours since it first started), we recommend that men seek assistance urgently. If during business hours, we recommend that men call our office to see if one of our providers are available to see you. If outside of business hours or if isn't possible to reach [facility] at [facility], we recommend going to the closest emergency room.                   How should I perform the injections?    The injection should be given directly into penile shaft at the 3 o'clock or 9 o'clock position. You do not want to give injection directly on top (12 o'clock) or bottom (6 o'clock). Please give the injection mid-shaft and avoid the head of your penis (see figure below).    Prepare the medication as directed. Some pharmacies will require mixing sterile water with the medication powder while others will pre-mix the medication.  Using the provided needle and syringe, draw up your prescribed dose of medication and replace the needle cap.  Grasp the head of your penis, not the skin. If you are not circumcised, pull your foreskin back before grasping the head of your penis. Pull your penis straight out.  Locate the area to be injected (see above). Wipe it with an alcohol swab.  Remove the cap covering the needle. Double check the syringe to make sure the dose is correct and you  haven't pushed any medication out by accident. Hold the syringe like a pen or a dart. Do not place your index finger or thumb on the plunger until the needle is all the way in the skin.   Once again, grasp the head of your penis with your thumb at the 12 o'clock position and pull it straight out. You must keep tension on your penis; do not twist it since this could lead to injecting the wrong area.  Touch the needle to the skin and gently slide it into the shaft of your penis. Make sure to avoid any veins.  Make sure to insert the needle at a slight angle and push it all the way in (See figure above).  Push down on the plunger to-inject the medication into the shaft of your penis. Be careful not to pull the syringe out as you are injecting the medication.  Remove the needle after you have injected all the medication. Pull it straight out. Do not use a twisting or jerking motion because this may cause bruising. Apply pressure, if bleeding, for 1 to 2 minutes with your thumb on the injection site and your index finger on the opposite side of your penis. If you are taking a blood thinner or aspirin, may need to hold longer.  Place the syringe into a sharps container. If you do not have a sharps container, you may use an empty plastic laundry detergent container. These can be disposed of safely at most major pharmacies.  We recommend alternating sides and injection location with each injection.    How do I find my ideal dose?    Finding your optimal intracavernosal injection dose can take some trial and error. For men that are new to intracavernosal injections, we recommend starting at 0.1 cc or 10 units (depending on syringe given by the pharmacy). If your response is insufficient, you may increase your dosing by 0.05 cc or 5 units as needed in order to achieve the desired effect (a firm erection lasting no longer than one hour). You should never perform more than one injection every 48 hours and you should never use a  dose higher than one full syringe (100 units or 1.0 cc). Increasing your dose by too much or performing more than 1 injection every 48 hours significantly increases your chance of developing a priapism (prolonged, painful erection).    How do I get started with intracavernosal injections?    If you are interested in starting intracavernosal injections and haven't yet discussed it with your provider or haven't been prescribed any medication yet, please call our office at [phone] to schedule an appointment or (if you're already an established patient) request a prescription and injection teaching appointment.    Our office primarily uses injectable medication that are made by high quality compounding pharmacies. We've not been able to identify any local compounding pharmacies that produce intracavernosal injections, so the pharmacies [place]. Your provider will inform you where your script has been sent. The details for each pharmacy are located below:    [facility]   Located in [place], [place].  Phone: [phone]  Website: [email or web address]  Hours: Monday-Friday 8:30 AM - 8:30  PM EST  Scripts are mailed directly to patients. Patients should receive a phone call shortly after their script has been sent in to confirm payment details and shipping address.  [facility]  Located in [place], [place].  Phone: [phone]  Website: [email or web address]   Hours: Monday-Friday 8 AM - 9 PM EST  Scripts are mailed directly to patients. Patients should receive a phone call shortly after their script has been sent in to confirm payment details and shipping address.    All men starting intracavernosal injections for the first time must attend an injection teaching appointment with our office team. After receiving your medications, please bring the following with you to your injection teaching appointment:    Medication  Syringes with needles  Cooler with a cold pack to transport the medication after you  leave (MEDICATION MUST BE KEPT REFRIGERATED ONCE MIXED)  A supply of over-the-counter non-extended release pseudoephedrine hydrochloride (aka Sudafed).    Some final reminders…    Do not perform more than one injection at a time. You should wait at least 48 hours between injections. Do not increase your dose by more than by 0.1 cc or 10 units at a time.  Always make sure to keep a supply of over-the-counter non-extended release pseudoephedrine hydrochloride (aka Sudafed) readily available just in case.  If you have a firm erection lasting longer than one hour, please take 120 mg of oral pseudoephedrine. If your erection has not resolved in 30 minutes (1.5 hours after starting) please call our office at [phone] and plan to see us in clinic. If it is after hours, please report to your nearest emergency room.      Inflatable Penile Prosthesis    The inflatable penile prosthesis is a surgical implant used to treat erectile dysfunction. Generally this procedure is used for patients that do not respond to, or do not tolerate, less invasive treatments (oral medications, urethral suppositories, vacuum devices or injection therapy), though some patients may opt for the operation prior to using those less invasive alternatives. This implant has been in use for decades and has a long track record of safety and efficacy. Patient satisfaction rates are approximately 90%. The prosthesis will allow you to have an erection suitable for sexual activity on demand. In the deflated position the penis will have a normal flaccid appearance.    This surgery is generally performed on an outpatient basis with a 23-hour admission under general or spinal anesthesia; some patients prefer and are able to be discharged home the same day as surgery. The procedure usually takes about an hour. It is performed through a small incision in the scrotum. Two fluid-filled cylinders are placed in the erectile tubes of the penis (one on each side,  called corpora cavernosa). A reservoir (balloon) that holds the fluid for the system is placed in the pelvis through the same incision or through a second small incision in your lower abdomen. A control pump that operates the device is placed in the scrotum under the skin between the testicles. Squeezing the pump transfers fluid from the reservoir into the cylinders to produce an erection. There is a separate button on the control pump used to deflate the cylinders.      Overall patient satisfaction rates are 90%. There are certain features of the device that are important to consider.  Although the cylinders expand when inflated, the erection that patients get with a prosthesis is generally smaller than their natural erection prior to having erectile dysfunction. Unfortunately, most men with long-standing erectile dysfunction lose penile length due to loss of normal elasticity of the erectile tubes (corpora cavernosa), and this cannot be corrected with surgery. The erectile bodies in some men do not extend fully into the tip of the penis. This is actually a separate compartment that normally fills with blood in men who do not have ED. If the cylinders do not extend far enough into the tip of the penis, the head of the penis may “droop” somewhat with erections. This rarely produces difficulty with sexual activity, but may rarely require a second revision surgery for correction. Complications of the procedure include skin infection, bleeding, prosthetic infection (1%), mechanical failure (the average device lasts 8-10 years), rare extrusion of the device (erodes through the skin), perforation of the urethra or erectile body during surgery (usually the surgery will be aborted), and rare cases of damage to the bowel, bladder or large blood vessel during placement of the reservoir balloon (very rare). Occasionally patients will complain of penile pain, either with the device inflated or in the deflated position. This  seems to be more common in diabetic patients. Very rarely, a patient will ask to have the device removed because of pain/discomfort. Infections are also more common in diabetic patients and patients with spinal cord injuries.     This operation is essentially irreversible. The device can be removed, but patients will generally be unable to have erections with any other form of treatment after having had a prosthesis. If the implant needs to be removed for any reason, patients will likely need to have it replaced to have erections in the future.      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 we can wait to see if the response to tadalafil returns to prior levels with use of TRT, or can elect to proceed with ICI, LETICIA, or penile prosthesis surgery      Herbie Platt MD         [1]   Past Medical History:  Diagnosis Date    Arrhythmia     PVC's    Arthritis 01/31/2024    osteo/ fingers, right hip    Bowel habit changes 01/31/2024    constipation, medicated prn    Cataract 01/31/2024    gianna iol    Coronary artery disease     Disorder of thyroid 01/31/2024    on levothyroxine    Glaucoma 01/31/2024    gtts daily    Headache(784.0)     Heart valve disease 01/31/2024    mitral valve replaced    High cholesterol 01/31/2024    medicated    Migraine     Muscle disorder     7/7/2023- pt unsure what this is referring to    Psychiatric problem 01/31/2024    anxiety, no meds    TIA (transient ischemic attack) 02/2017    no residual weaknesses/problems per pt   [2]   Past Surgical History:  Procedure Laterality Date    PB INCISE FINGER TENDON SHEATH Left 11/19/2024    Procedure: LEFT LONG FINGER TRIGGER RELEASE;  Surgeon: Crystal Sena M.D.;  Location: [facility];  Service: Orthopedics    OH LARYNGOSCOPY,DIRECT,SCOPE,INJ  CORDS Bilateral 2/20/2024    Procedure: BILATERAL LARYNGOSCOPY, DIRECT WITH INJECTION INTO VOCAL CORD(S), THERAPEUTIC; WITH OPERATING MICROSCOPE OR TELESCOPE;  Surgeon: Haley Vargas M.D.;  Location: SURGERY SAME DAY [facility];  Service: Ent    KNEE ARTHROPLASTY TOTAL Right 01/31/2024    replacement    AZ TOTAL HIP ARTHROPLASTY Left 09/11/2023    Procedure: LEFT ANTERIOR TOTAL HIP ARTHROPLASTY;  Surgeon: Yvon Leonard M.D.;  Location: [facility];  Service: Orthopedics    AZ LARYNGOSCOPY,DIRECT,SCOPE,INJ CORDS N/A 08/14/2023    Procedure: BILATERAL LARYNGOSCOPY, DIRECT, WITH INJECTION INTO VOCAL CORD(S), THERAPEUTIC; WITH OPERATIVE TELESCOPE;  Surgeon: Haley Vargas M.D.;  Location: SURGERY SAME DAY [facility];  Service: Ent    AZ REPAIR OF NASAL SEPTUM N/A 06/04/2020    Procedure: SEPTOPLASTY, NOSE;  Surgeon: Glenn Block M.D.;  Location: SURGERY SAME DAY [facility];  Service: Ent    REPAIR OR RECONSTRUCTION, NASAL VALVE N/A 06/04/2020    Procedure: REPAIR OR RECONSTRUCTION, NASAL VALVE - W/LATERAL NASAL IMPLANTS;  Surgeon: Glenn Block M.D.;  Location: SURGERY SAME DAY [facility];  Service: Ent    TURBINATE REDUCTION Bilateral 06/04/2020    Procedure: REDUCTION, NASAL TURBINATE;  Surgeon: Glenn Block M.D.;  Location: SURGERY SAME DAY [facility];  Service: Ent    KNEE ARTHROPLASTY TOTAL Left 01/21/2019    Procedure: KNEE ARTHROPLASTY TOTAL;  Surgeon: Dennis Oliveira M.D.;  Location: [facility];  Service: Orthopedics    OTHER CARDIAC SURGERY  2017    heart valve    OTHER ORTHOPEDIC SURGERY  2009    right wrist thumb    OTHER ORTHOPEDIC SURGERY  2006    left wrist/thumb    OTHER NEUROLOGICAL SURG  1968    L5 S1 spinal fusion    CATARACT PHACO WITH IOL Bilateral     CERVICAL LAMINECTOMY POSTERIOR  08-    OPEN REDUCTION      ORIF, KNEE  2006, 2019    tibia 1957, 54    ORIF, WRIST  2010    Heart 2017    TURP-VAPOR     [3]   Current Outpatient  Medications   Medication Sig Dispense Refill    predniSONE (DELTASONE) 20 MG Tab 3 TABLETS FOR 3 DAYS 2 TABLETS FOR 2 DAYS 1 TABLET FOR 2 DAYS 15 Tablet 0    tadalafil (CIALIS) 10 MG tablet Take 1 Tablet by mouth 1 time a day as needed for Erectile Dysfunction. 10 Tablet 3    Vibegron (GEMTESA PO) Take  by mouth.      Cholecalciferol 2000 UNIT Cap Take 1 Tablet by mouth every day.      Multiple Vitamin (MULTIVITAMIN PO) Take  by mouth every day.      Non Formulary Request Take  by mouth as needed. pataday      Docusate Calcium (STOOL SOFTENER PO) Take  by mouth as needed.      aspirin EC (ECOTRIN) 81 MG Tablet Delayed Response Take 1 Tablet by mouth 2 times a day with meals. (Patient taking differently: Take 81 mg by mouth every day.) 84 Tablet 0    atorvastatin (LIPITOR) 10 MG Tab Take 1 Tablet by mouth every day.      Coenzyme Q10 (VITALINE COQ10) 300 MG Wafer Take 300 mg by mouth every day.      carvedilol (COREG) 3.125 MG Tab Take 3.125 mg by mouth at bedtime.      latanoprost (XALATAN) 0.005 % Solution latanoprost 0.005 % eye drops   INSTILL 1 DROP INTO BOTH EYES AT BEDTIME      Magnesium 250 MG Tab Take 1 Tablet by mouth.      Probiotic Product (ALIGN) Cap Take 4 Capsules by mouth.      Apoaequorin (PREVAGEN) 10 MG Cap Take 10 mg by mouth every day.      Brinzolamide-Brimonidine (SIMBRINZA) 1-0.2 % Suspension Simbrinza 1 %-0.2 % eye drops,suspension   INSTILL 1 DROP INTO BOTH EYES TWICE A DAY      escitalopram (LEXAPRO) 10 MG Tab Take 10 mg by mouth every bedtime.      timolol (TIMOPTIC) 0.5 % Solution Place 1 Drop in both eyes 2 times a day.      finasteride (PROSCAR) 5 MG Tab Take 5 mg by mouth every day.      Cyanocobalamin (VITAMIN B12 PO) Take 1 Tab by mouth every day.      levothyroxine (SYNTHROID) 25 MCG Tab Take 25 mcg by mouth Every morning on an empty stomach.      Biotin 2500 MCG Cap Take 1 Cap by mouth every day.       No current facility-administered medications for this visit.   [4] No Known  Allergies

## 2025-07-07 ENCOUNTER — HOSPITAL ENCOUNTER (OUTPATIENT)
Dept: LAB | Facility: MEDICAL CENTER | Age: 85
End: 2025-07-07
Attending: UROLOGY
Payer: MEDICARE

## 2025-07-07 DIAGNOSIS — N52.9 ED (ERECTILE DYSFUNCTION) OF ORGANIC ORIGIN: ICD-10-CM

## 2025-07-07 DIAGNOSIS — E29.1 HYPOGONADISM IN MALE: ICD-10-CM

## 2025-07-07 LAB — TESTOST SERPL-MCNC: 767 NG/DL (ref 175–781)

## 2025-07-07 PROCEDURE — 36415 COLL VENOUS BLD VENIPUNCTURE: CPT

## 2025-07-07 PROCEDURE — 84403 ASSAY OF TOTAL TESTOSTERONE: CPT

## 2025-07-11 ENCOUNTER — PATIENT MESSAGE (OUTPATIENT)
Dept: UROLOGY | Facility: MEDICAL CENTER | Age: 85
End: 2025-07-11

## 2025-07-11 DIAGNOSIS — R49.0 DYSPHONIA: Primary | Chronic | ICD-10-CM

## 2025-07-16 ENCOUNTER — TELEPHONE (OUTPATIENT)
Dept: UROLOGY | Facility: MEDICAL CENTER | Age: 85
End: 2025-07-16
Payer: MEDICARE

## 2025-07-16 DIAGNOSIS — E29.1 HYPOGONADISM IN MALE: ICD-10-CM

## 2025-07-16 DIAGNOSIS — N40.1 BENIGN PROSTATIC HYPERPLASIA WITH NOCTURIA: ICD-10-CM

## 2025-07-16 DIAGNOSIS — R35.1 BENIGN PROSTATIC HYPERPLASIA WITH NOCTURIA: ICD-10-CM

## 2025-07-16 DIAGNOSIS — N52.9 ED (ERECTILE DYSFUNCTION) OF ORGANIC ORIGIN: Primary | ICD-10-CM

## 2025-07-16 NOTE — TELEPHONE ENCOUNTER
----- Message from TANVI SOLIS sent at 7/16/2025  1:58 PM PDT -----  Regarding: [facility]  Please obtain a [facility] referral for pt, ty!

## 2025-07-17 ENCOUNTER — OFFICE VISIT (OUTPATIENT)
Dept: UROLOGY | Facility: MEDICAL CENTER | Age: 85
End: 2025-07-17
Payer: MEDICARE

## 2025-07-17 DIAGNOSIS — N52.9 ED (ERECTILE DYSFUNCTION) OF ORGANIC ORIGIN: Primary | ICD-10-CM

## 2025-07-17 PROCEDURE — 99213 OFFICE O/P EST LOW 20 MIN: CPT | Performed by: UROLOGY

## 2025-07-17 NOTE — PROGRESS NOTES
Chief Complaint: Erectile dysfunction    HPI: Jett Nava is a 84 y.o. male with a history of erectile dysfunction without response to use of PDE5 inhibitors. He also had a poor response to use of a vacuum erection device. He is following up today after recent lab work demonstrated a normal (excellent) total serum testosterone.     He has been prescribed intracavernosal injections of Trimix by another provider, but has had some difficulty with use and dosing and has not yet had a response. Here today to learn more about the treatment. He feels well overall/     Past Medical History:  Past Medical History[1]    Past Surgical History:  Past Surgical History[2]    Family History:  Family History   Problem Relation Age of Onset    Arthritis Mother     Genetic Disorder Mother     Cancer Father     Alcohol/Drug Father     Arthritis Brother     Genetic Disorder Brother     Alcohol/Drug Brother     Alcohol/Drug Maternal Aunt     Stroke Maternal Uncle     Alcohol/Drug Maternal Uncle     Genetic Disorder Paternal Uncle     Genetic Disorder Maternal Grandmother     Stroke Maternal Grandfather     Genetic Disorder Paternal Grandmother     Psychiatric Illness Paternal Grandmother        Social History:  Social History     Socioeconomic History    Marital status:      Spouse name: Not on file    Number of children: Not on file    Years of education: Not on file    Highest education level: Not on file   Occupational History    Not on file   Tobacco Use    Smoking status: Former     Current packs/day: 0.00     Average packs/day: 1 pack/day for 14.3 years (14.3 ttl pk-yrs)     Types: Cigarettes, Pipe     Start date: 1955     Quit date: 1970     Years since quittin.5    Smokeless tobacco: Never    Tobacco comments:     Bad idea   Vaping Use    Vaping status: Never Used   Substance and Sexual Activity    Alcohol use: Not Currently     Comment: maybe 3-4 per week    Drug use: No    Sexual activity: Not on  file   Other Topics Concern    Not on file   Social History Narrative    Not on file     Social Drivers of Health     Financial Resource Strain: Low Risk  (12/17/2024)    Received from [facility]    Overall Financial Resource Strain (CARDIA)     Difficulty of Paying Living Expenses: Not hard at all   Food Insecurity: No Food Insecurity (12/17/2024)    Received from [facility]    Hunger Vital Sign     Worried About Running Out of Food in the Last Year: Never true     Ran Out of Food in the Last Year: Never true   Transportation Needs: No Transportation Needs (12/17/2024)    Received from [facility]    PRAPARE - Transportation     Lack of Transportation (Medical): No     Lack of Transportation (Non-Medical): No   Physical Activity: Insufficiently Active (12/17/2024)    Received from [facility]    Exercise Vital Sign     Days of Exercise per Week: 3 days     Minutes of Exercise per Session: 20 min   Stress: No Stress Concern Present (12/17/2024)    Received from [facility]    [employer] [place] [employer] - Occupational Stress Questionnaire     Feeling of Stress : Only a little   Social Connections: Socially Isolated (12/17/2024)    Received from [facility]    Social Connection and Isolation Panel [NHANES]     Frequency of Communication with Friends and Family: Never     Frequency of Social Gatherings with Friends and Family: Never     Attends [employer] Services: Never     Active Member of Clubs or Organizations: No     Attends Club or Organization Meetings: Never     Marital Status:    Intimate Partner Violence: Not At Risk (12/17/2024)    Received from [facility]    Humiliation, Afraid, Rape, and Kick questionnaire     Fear of Current or Ex-Partner: No     Emotionally Abused: No     Physically Abused: No     Sexually Abused: No   Housing Stability: Low Risk  (12/17/2024)    Received from [facility]    Housing Stability Vital Sign     Unable to Pay for  "Housing in the Last Year: No     Number of Times Moved in the Last Year: 1     Homeless in the Last Year: No       Medications:  Current Medications[3]    Allergies:  Allergies[4]    Review of Systems:  Constitutional: Negative for fever, chills and malaise/fatigue.   HENT: Negative for congestion.    Eyes: Negative for pain.   Respiratory: Negative for cough and shortness of breath.    Cardiovascular: Negative for leg swelling.   Gastrointestinal: Negative for nausea, vomiting, abdominal pain and diarrhea.   Genitourinary: Negative for dysuria and hematuria.   Skin: Negative for rash.   Neurological: Negative for dizziness, focal weakness and headaches.   Endo/Heme/Allergies: Does not bruise/bleed easily.   Psychiatric/Behavioral: Negative for depression.  The patient is not nervous/anxious.        Physical Exam:  There were no vitals filed for this visit.    GENERAL: well appearing, well nourished, NAD  RESP: respiratory effort normal  ABDOMEN: soft, nontender, nondistended, no masses or organomegaly  SKIN/LYMPH: normal coloration and turgor, no suspicious skin lesions noted  NEURO/PSYCH: alert, oriented, normal speech, no focal findings or movement disorder noted  EXTREMITIES: no pedal edema noted      Data Review:    Labs:  POCT UA No results found for: \"POCCOLOR\", \"POCAPPEAR\", \"POCLEUKEST\", \"POCNITRITE\", \"POCUROBILIGE\", \"POCPROTEIN\", \"POCURPH\", \"POCBLOOD\", \"POCSPGRV\", \"POCKETONES\", \"POCBILIRUBIN\", \"POCGLUCUA\"   CBC   Lab Results   Component Value Date/Time    WBC 8.20 12/17/2024 2359    RBC 4.51 12/17/2024 2359    HEMOGLOBIN 14.7 12/17/2024 2359    HEMATOCRIT 43.1 12/17/2024 2359    MCV 95.7 (H) 12/17/2024 2359    MCH 32.6 12/17/2024 2359    MCHC 34.1 12/17/2024 2359    RDW 13.6 12/17/2024 2359    MPV 8.0 12/17/2024 2359    LYMPHOCYTES 19.0 (L) 12/17/2024 2359    LYMPHS 1.5 12/17/2024 2359    MONOCYTES 9.0 12/17/2024 2359    MONOS 0.7 12/17/2024 2359    EOSINOPHILS 2.2 12/17/2024 2359    EOS 0.2 12/17/2024 2359 " "   BASOPHILS 0.7 12/17/2024 2359    BASO 0.1 12/17/2024 2359    NRBC 0.00 11/02/2020 1258       CMP   Lab Results   Component Value Date/Time    SODIUM 142 12/17/2024 2359    POTASSIUM 4.2 12/17/2024 2359    CHLORIDE 109 12/17/2024 2359    CO2 28.0 12/17/2024 2359    ANION 5.0 12/17/2024 2359    GLUCOSE 100 12/17/2024 2359    BUN 19.0 12/17/2024 2359    CREATININE 1.00 12/17/2024 2359    CALCIUM 9.1 12/17/2024 2359    ASTSGOT 12 12/17/2024 2359    ALTSGPT 14 12/17/2024 2359    ALKPHOSPHAT 80 12/17/2024 2359    TBILIRUBIN 1.0 12/17/2024 2359    ALBUMIN 3.6 12/17/2024 2359    TOTPROTEIN 5.7 (L) 12/17/2024 2359    GLOBULIN 2.7 11/03/2020 0725    AGRATIO 1.7 12/17/2024 2359     INFERTILITY   Lab Results   Component Value Date/Time    TESTOSTERONE 767 07/07/2025 0929    FREETESTOST 74 03/17/2018 0821    SEXHORM 80 03/17/2018 0821     PSA No results found for: \"PSATOTAL\"    Assessment: 84 y.o.male with a history of organic erectile dysfunction without response to use of PDE5 inhibitors, nor to use of a vacuum erection device. He has intracavernosal injections (previously prescribed) and is here today to discuss proper use, administration, and dosage.     Intracavernosal Injections    This handout explains what intracavernosal injections are and how they are used to help men achieve erections suitable for sex.                   What are intracavernosal injections and how do they work?    Intracavernosal injections are a way for men with more severe erectile dysfunction to achieve an erection suitable for sex.    Medications that are designed to manage erectile dysfunction work by prompting the penile arteries to dilate. This allows for sufficient blood flow into the corpora cavernosa to provide an erection. Unfortunately, oral medications like sildenafil (Viagra) and tadalafil (Cialis) do not work for everyone, can have intolerable side effects, or interact with other medications such that they must be avoided.  "     Intracavernosal injections contain medications that are similar to oral medications in function, but are much more potent.  There are many different formulations of intracavernosal injections, each with different names. Some common names include Bimix, Trimix, and Quadmix. Although they are usually not covered by insurance, they are usually quite affordable from compounding pharmacies.    What are the benefits of intracavernosal injections?    Intracavernosal injections are a great option for men with who wish to regain sexual function but do not desire (or are not candidates for) a more lasting solution like the penile implant.     This means they can be a great option for men too ill to undergo medical procedures or for men who are still in the first few months of recovery following prostate removal for prostate cancer.    Intracavernosal injections are also more potent than oral medications like sildenafil (Viagra) and tadalafil (Cialis), which means that they can work for men who have failed those options. They also do not have the same systemic side effects like headaches, muscle aches, visual changes or heartburn.    What are the drawbacks of intracavernosal injections?    Although intracavernosal injections are generally considered to be safe, it is important to be aware of potential side effects.     Intracavernosal injections must be performed on an as-needed basis. This means that men must perform a penile injection every time they wish to achieve an erection. They also must be kept in the refrigerator as the medication quickly deteriorates at room temperature. These two facts mean that injections are not very spontaneous, which can be a challenge for many couples.     Although it takes some time to develop, men who use injections for several months or longer are at risk for developing scar tissue in the penis known as fibrosis. This can make the injections more difficult to perform. In some cases, men  can even develop Peyronie's Disease and penile curvature as the result of their injections.    It's important to remember that injections are not a cure for erectile dysfunction and do not stop the progressive loss of penile length and girth cause by it. On average, men with significant erectile dysfunction will lose about 1 inch of penile length each year. The only solution that stops this ongoing loss and allows men to recover their some of their size is the penile implant.    Some men may notice a slight burning with their injection. This may depend on the type of medication being used and can occasionally persist for several minutes. Men should also be careful to only inject along the side of the penile shaft, as the urine tube (the urethra) runs at the 6 o'clock position and the nerves that supply the head of the penis run at the 12 o'clock position. Detailed instructions on injection technique are provided below.    Occasionally, the injections may work too well and cause a prolonged erection that can be painful known as a priapism. Although uncommon, this is defined as an erection lasting longer than 4 hours and is considered to be a medical emergency.    Generally, we recommend that patients seek care well before the 4-hour dre. Men experiencing a prolonged erection lasting 1 full hour should take 120 mg of over-the-counter non-extended release pseudoephedrine hydrochloride (aka Sudafed). Men starting injection therapy should keep a supply of this medication on-hand just in case. If the erection has not resolved in 30 minutes (1.5 hours since it first started), we recommend that men seek assistance urgently. If during business hours, we recommend that men call our office to see if one of our providers are available to see you. If outside of business hours or if isn't possible to [facility] at [facility], we recommend going to the closest emergency room.                   How  should I perform the injections?    The injection should be given directly into penile shaft at the 3 o'clock or 9 o'clock position. You do not want to give injection directly on top (12 o'clock) or bottom (6 o'clock). Please give the injection mid-shaft and avoid the head of your penis (see figure below).    Prepare the medication as directed. Some pharmacies will require mixing sterile water with the medication powder while others will pre-mix the medication.  Using the provided needle and syringe, draw up your prescribed dose of medication and replace the needle cap.  Grasp the head of your penis, not the skin. If you are not circumcised, pull your foreskin back before grasping the head of your penis. Pull your penis straight out.  Locate the area to be injected (see above). Wipe it with an alcohol swab.  Remove the cap covering the needle. Double check the syringe to make sure the dose is correct and you haven't pushed any medication out by accident. Hold the syringe like a pen or a dart. Do not place your index finger or thumb on the plunger until the needle is all the way in the skin.   Once again, grasp the head of your penis with your thumb at the 12 o'clock position and pull it straight out. You must keep tension on your penis; do not twist it since this could lead to injecting the wrong area.  Touch the needle to the skin and gently slide it into the shaft of your penis. Make sure to avoid any veins.  Make sure to insert the needle at a slight angle and push it all the way in (See figure above).  Push down on the plunger to-inject the medication into the shaft of your penis. Be careful not to pull the syringe out as you are injecting the medication.  Remove the needle after you have injected all the medication. Pull it straight out. Do not use a twisting or jerking motion because this may cause bruising. Apply pressure, if bleeding, for 1 to 2 minutes with your thumb on the injection site and your index  finger on the opposite side of your penis. If you are taking a blood thinner or aspirin, may need to hold longer.  Place the syringe into a sharps container. If you do not have a sharps container, you may use an empty plastic laundry detergent container. These can be disposed of safely at most major pharmacies.  We recommend alternating sides and injection location with each injection.    How do I find my ideal dose?    Finding your optimal intracavernosal injection dose can take some trial and error. For men that are new to intracavernosal injections, we recommend starting at 0.1 cc or 10 units (depending on syringe given by the pharmacy). If your response is insufficient, you may increase your dosing by 0.05 cc or 5 units as needed in order to achieve the desired effect (a firm erection lasting no longer than one hour). You should never perform more than one injection every 48 hours and you should never use a dose higher than one full syringe (100 units or 1.0 cc). Increasing your dose by too much or performing more than 1 injection every 48 hours significantly increases your chance of developing a priapism (prolonged, painful erection).    How do I get started with intracavernosal injections?    If you are interested in starting intracavernosal injections and haven't yet discussed it with your provider or haven't been prescribed any medication yet, please call our office at [phone] to schedule an appointment or (if you're already an established patient) request a prescription and injection teaching appointment.    Our office primarily uses injectable medication that are made by high quality compounding pharmacies. We've not been able to identify any local compounding pharmacies that produce intracavernosal injections, so the pharmacies [place]. Your provider will inform you where your script has been sent. The details for each pharmacy are located below:    [facility]   Located in  [place], [place].  Phone: [phone]  Website: [email or web address]  Hours: Monday-Friday 8:30 AM - 8:30  PM EST  Scripts are mailed directly to patients. Patients should receive a phone call shortly after their script has been sent in to confirm payment details and shipping address.  [facility]  Located in [place], [place].  Phone: [phone]  Website: [email or web address]   Hours: Monday-Friday 8 AM - 9 PM EST  Scripts are mailed directly to patients. Patients should receive a phone call shortly after their script has been sent in to confirm payment details and shipping address.    All men starting intracavernosal injections for the first time must attend an injection teaching appointment with our office team. After receiving your medications, please bring the following with you to your injection teaching appointment:    Medication  Syringes with needles  Cooler with a cold pack to transport the medication after you leave (MEDICATION MUST BE KEPT REFRIGERATED ONCE MIXED)  A supply of over-the-counter non-extended release pseudoephedrine hydrochloride (aka Sudafed).    Some final reminders…    Do not perform more than one injection at a time. You should wait at least 48 hours between injections. Do not increase your dose by more than by 0.1 cc or 10 units at a time.  Always make sure to keep a supply of over-the-counter non-extended release pseudoephedrine hydrochloride (aka Sudafed) readily available just in case.  If you have a firm erection lasting longer than one hour, please take 120 mg of oral pseudoephedrine. If your erection has not resolved in 30 minutes (1.5 hours after starting) please call our office at [phone] and plan to see us in clinic. If it is after hours, please report to your nearest emergency room.        Plan:    -Ok to next use 30 units of Trimix injection  -If 30 units is insufficient, can increase by 5 units per attempt; recommend using no more than every 48 hours  -If unsuccessful we can  trial use of medication from another compounding pharmacy that I use more often (his current supply was not prescribed here), or try to troubleshoot the LETICIA in the office. Finally, we can discuss surgical treatment options further if interested  -Follow up in 6 months or sooner as needed     Herbie Platt MD       [1]   Past Medical History:  Diagnosis Date    Arrhythmia     PVC's    Arthritis 01/31/2024    osteo/ fingers, right hip    Bowel habit changes 01/31/2024    constipation, medicated prn    Cataract 01/31/2024    gianna iol    Coronary artery disease     Disorder of thyroid 01/31/2024    on levothyroxine    Glaucoma 01/31/2024    gtts daily    Headache(784.0)     Heart valve disease 01/31/2024    mitral valve replaced    High cholesterol 01/31/2024    medicated    Migraine     Muscle disorder     7/7/2023- pt unsure what this is referring to    Psychiatric problem 01/31/2024    anxiety, no meds    TIA (transient ischemic attack) 02/2017    no residual weaknesses/problems per pt   [2]   Past Surgical History:  Procedure Laterality Date    PB INCISE FINGER TENDON SHEATH Left 11/19/2024    Procedure: LEFT LONG FINGER TRIGGER RELEASE;  Surgeon: Crystal Sena M.D.;  Location: [facility] [place];  Service: Orthopedics    NY LARYNGOSCOPY,DIRECT,SCOPE,INJ CORDS Bilateral 2/20/2024    Procedure: BILATERAL LARYNGOSCOPY, DIRECT WITH INJECTION INTO VOCAL CORD(S), THERAPEUTIC; WITH OPERATING MICROSCOPE OR TELESCOPE;  Surgeon: Haley Vargas M.D.;  Location: SURGERY SAME DAY [facility];  Service: Ent    KNEE ARTHROPLASTY TOTAL Right 01/31/2024    replacement    NY TOTAL HIP ARTHROPLASTY Left 09/11/2023    Procedure: LEFT ANTERIOR TOTAL HIP ARTHROPLASTY;  Surgeon: Yvon Leonard M.D.;  Location: [facility] [place];  Service: Orthopedics    NY LARYNGOSCOPY,DIRECT,SCOPE,INJ CORDS N/A 08/14/2023    Procedure: BILATERAL LARYNGOSCOPY, DIRECT, WITH INJECTION INTO VOCAL CORD(S), THERAPEUTIC; WITH  OPERATIVE TELESCOPE;  Surgeon: Haley Vargas M.D.;  Location: SURGERY SAME DAY [facility];  Service: Ent    RI REPAIR OF NASAL SEPTUM N/A 06/04/2020    Procedure: SEPTOPLASTY, NOSE;  Surgeon: Glenn Block M.D.;  Location: SURGERY SAME DAY [facility];  Service: Ent    REPAIR OR RECONSTRUCTION, NASAL VALVE N/A 06/04/2020    Procedure: REPAIR OR RECONSTRUCTION, NASAL VALVE - W/LATERAL NASAL IMPLANTS;  Surgeon: Glenn Block M.D.;  Location: SURGERY SAME DAY [facility];  Service: Ent    TURBINATE REDUCTION Bilateral 06/04/2020    Procedure: REDUCTION, NASAL TURBINATE;  Surgeon: Glenn Block M.D.;  Location: SURGERY SAME DAY [facility];  Service: Ent    KNEE ARTHROPLASTY TOTAL Left 01/21/2019    Procedure: KNEE ARTHROPLASTY TOTAL;  Surgeon: Dennis Oliveira M.D.;  Location: SURGERY [facility];  Service: Orthopedics    OTHER CARDIAC SURGERY  2017    heart valve    OTHER ORTHOPEDIC SURGERY  2009    right wrist thumb    OTHER ORTHOPEDIC SURGERY  2006    left wrist/thumb    OTHER NEUROLOGICAL SURG  1968    L5 S1 spinal fusion    CATARACT PHACO WITH IOL Bilateral     CERVICAL LAMINECTOMY POSTERIOR  08-    OPEN REDUCTION      ORIF, KNEE  2006, 2019    tibia 1957, 54    ORIF, WRIST  2010    Heart 2017    TURP-VAPOR     [3]   Current Outpatient Medications   Medication Sig Dispense Refill    predniSONE (DELTASONE) 20 MG Tab 3 TABLETS FOR 3 DAYS 2 TABLETS FOR 2 DAYS 1 TABLET FOR 2 DAYS 15 Tablet 0    tadalafil (CIALIS) 10 MG tablet Take 1 Tablet by mouth 1 time a day as needed for Erectile Dysfunction. 10 Tablet 3    Vibegron (GEMTESA PO) Take  by mouth.      Cholecalciferol 2000 UNIT Cap Take 1 Tablet by mouth every day.      Multiple Vitamin (MULTIVITAMIN PO) Take  by mouth every day.      Non Formulary Request Take  by mouth as needed. pataday      Docusate Calcium (STOOL SOFTENER PO) Take  by mouth as needed.      aspirin EC (ECOTRIN) 81 MG Tablet Delayed Response Take 1 Tablet by mouth 2 times a  day with meals. (Patient taking differently: Take 81 mg by mouth every day.) 84 Tablet 0    atorvastatin (LIPITOR) 10 MG Tab Take 1 Tablet by mouth every day.      Coenzyme Q10 (VITALINE COQ10) 300 MG Wafer Take 300 mg by mouth every day.      carvedilol (COREG) 3.125 MG Tab Take 3.125 mg by mouth at bedtime.      latanoprost (XALATAN) 0.005 % Solution latanoprost 0.005 % eye drops   INSTILL 1 DROP INTO BOTH EYES AT BEDTIME      Magnesium 250 MG Tab Take 1 Tablet by mouth.      Probiotic Product (ALIGN) Cap Take 4 Capsules by mouth.      Apoaequorin (PREVAGEN) 10 MG Cap Take 10 mg by mouth every day.      Brinzolamide-Brimonidine (SIMBRINZA) 1-0.2 % Suspension Simbrinza 1 %-0.2 % eye drops,suspension   INSTILL 1 DROP INTO BOTH EYES TWICE A DAY      escitalopram (LEXAPRO) 10 MG Tab Take 10 mg by mouth every bedtime.      timolol (TIMOPTIC) 0.5 % Solution Place 1 Drop in both eyes 2 times a day.      finasteride (PROSCAR) 5 MG Tab Take 5 mg by mouth every day.      Cyanocobalamin (VITAMIN B12 PO) Take 1 Tab by mouth every day.      levothyroxine (SYNTHROID) 25 MCG Tab Take 25 mcg by mouth Every morning on an empty stomach.      Biotin 2500 MCG Cap Take 1 Cap by mouth every day.       No current facility-administered medications for this visit.   [4] No Known Allergies

## 2025-07-17 NOTE — Clinical Note
REFERRAL APPROVAL NOTICE         Sent on July 17, 2025                   Kash Nava  [address]  [facility] [address]                   Dear Mr. Nava,    After a careful review of the medical information and benefit coverage, [facility] has processed your referral. See below for additional details.    If applicable, you must be actively enrolled with your insurance for coverage of the authorized service. If you have any questions regarding your coverage, please contact your insurance directly.    REFERRAL INFORMATION   Referral #:  [id number]  Referred-To Department    Referred-By Provider:  Urology    Herbie Platt M.D.   [facility]      [address]  [facility] [address]  [phone] [address]  [facility] [id number] [phone]    Referral Start Date:  07/17/2025  Referral End Date:   07/16/2026             SCHEDULING  If you do not already have an appointment, please call [phone] to make an appointment.     MORE INFORMATION  If you do not already have a [employer] account, sign up at: [email or web address]  You can access your medical information, make appointments, see lab results, billing information, and more.  If you have questions regarding this referral, please contact  the [facility] department at:             [phone]. Monday - Friday 8:00AM - 5:00PM.     Sincerely,    [facility]

## 2025-08-21 ENCOUNTER — HOSPITAL ENCOUNTER (OUTPATIENT)
Facility: MEDICAL CENTER | Age: 85
End: 2025-08-21
Attending: UROLOGY
Payer: MEDICARE

## 2025-08-21 DIAGNOSIS — R49.0 DYSPHONIA: Chronic | ICD-10-CM

## 2025-08-21 PROCEDURE — 87086 URINE CULTURE/COLONY COUNT: CPT | Mod: GA

## 2025-08-22 ENCOUNTER — TELEPHONE (OUTPATIENT)
Dept: UROLOGY | Facility: MEDICAL CENTER | Age: 85
End: 2025-08-22
Payer: MEDICARE

## 2025-08-23 LAB
BACTERIA UR CULT: NORMAL
SIGNIFICANT IND 70042: NORMAL
SITE SITE: NORMAL
SOURCE SOURCE: NORMAL

## 2025-08-28 ENCOUNTER — OFFICE VISIT (OUTPATIENT)
Dept: UROLOGY | Facility: MEDICAL CENTER | Age: 85
End: 2025-08-28
Payer: MEDICARE

## 2025-08-28 DIAGNOSIS — N52.9 ED (ERECTILE DYSFUNCTION) OF ORGANIC ORIGIN: Primary | ICD-10-CM

## 2025-08-28 PROCEDURE — 99212 OFFICE O/P EST SF 10 MIN: CPT | Performed by: UROLOGY

## (undated) DEVICE — PATTIES SURG X-RAYCOTTONOID - 1/2 X 3 IN (200/CA)

## (undated) DEVICE — PACK ENT OR - (2EA/CA)

## (undated) DEVICE — SODIUM CHL IRRIGATION 0.9% 1000ML (12EA/CA)

## (undated) DEVICE — NEEDLE ANESTHESIA RIGI INTECTION WITH SUPPORT CANNULA 24GA (1/EA)

## (undated) DEVICE — ANTI-FOG SOLUTION - 60BTL/CA

## (undated) DEVICE — CANISTER SUCTION RIGID RED 1500CC (40EA/CA)

## (undated) DEVICE — TOWEL STOP TIMEOUT SAFETY FLAG (40EA/CA)

## (undated) DEVICE — SUTURE 2-0 VICRYL PLUS CT-1 - 8 X 18 INCH(12/BX)

## (undated) DEVICE — GLOVE BIOGEL ECLIPSE PF LATEX SIZE 8.0  (50PR/BX)

## (undated) DEVICE — SUCTION INSTRUMENT YANKAUER BULBOUS TIP W/O VENT (50EA/CA)

## (undated) DEVICE — MASK ANESTHESIA ADULT  - (100/CA)

## (undated) DEVICE — PACK TOTAL KNEE  (1/CA)

## (undated) DEVICE — KIT SURGIFLO W/OUT THROMBIN - (6EA/CA)

## (undated) DEVICE — Device

## (undated) DEVICE — DRESSING NASOPORE 8CM STD - (8EA/PK)

## (undated) DEVICE — GOWN WARMING STANDARD FLEX - (30/CA)

## (undated) DEVICE — GLOVE BIOGEL PI INDICATOR SZ 7.0 SURGICAL PF LF - (50/BX 4BX/CA)

## (undated) DEVICE — KIT  I.V. START (100EA/CA)

## (undated) DEVICE — PROTECTOR ULNA NERVE - (36PR/CA)

## (undated) DEVICE — TUBING CLEARLINK DUO-VENT - C-FLO (48EA/CA)

## (undated) DEVICE — MASK OXYGEN VNYL ADLT MED CONC WITH 7 FOOT TUBING  - (50EA/CA)

## (undated) DEVICE — SET LEADWIRE 5 LEAD BEDSIDE DISPOSABLE ECG (1SET OF 5/EA)

## (undated) DEVICE — STOCKINET TUBULAR 6IN STERILE - 6 X 48YDS (25/CA)

## (undated) DEVICE — CHLORAPREP 26 ML APPLICATOR - ORANGE TINT(25/CA)

## (undated) DEVICE — SUTURE GENERAL

## (undated) DEVICE — CANISTER SUCTION 3000ML MECHANICAL FILTER AUTO SHUTOFF MEDI-VAC NONSTERILE LF DISP  (40EA/CA)

## (undated) DEVICE — BLADE SAW 90X25X1.37MM SAGITTAL DUAL CUT

## (undated) DEVICE — ELECTRODE 850 FOAM ADHESIVE - HYDROGEL RADIOTRNSPRNT (50/PK)

## (undated) DEVICE — 6-0 ETHILON CLEAR P-1 - (12/BX)

## (undated) DEVICE — CANNULA O2 COMFORT SOFT EAR ADULT 7 FT TUBING (50/CA)

## (undated) DEVICE — TUBE CONNECTING SUCTION - CLEAR PLASTIC STERILE 72 IN (50EA/CA)

## (undated) DEVICE — SUTURE 3-0 MONOCRYL PLUS PS-1 - 27 INCH (36/BX)

## (undated) DEVICE — MIXER BONE CEMENT REVOLUTION - W/FEMORAL PRESSURIZER (6/CA)

## (undated) DEVICE — SENSOR OXIMETER ADULT SPO2 RD SET (20EA/BX)

## (undated) DEVICE — LACTATED RINGERS INJ 1000 ML - (14EA/CA 60CA/PF)

## (undated) DEVICE — GLOVE BIOGEL SZ 7 SURGICAL PF LTX - (50PR/BX 4BX/CA)

## (undated) DEVICE — KIT ROOM DECONTAMINATION

## (undated) DEVICE — KIT ANESTHESIA W/CIRCUIT & 3/LT BAG W/FILTER (20EA/CA)

## (undated) DEVICE — HEAD HOLDER JUNIOR/ADULT

## (undated) DEVICE — CLOSURE SKIN STRIP 1/2 X 4 IN - (STERI STRIP) (50/BX 4BX/CA)

## (undated) DEVICE — GLOVE BIOGEL INDICATOR SZ 8.5 SURGICAL PF LTX - (50/BX 4BX/CA)

## (undated) DEVICE — CATHETER IV 20 GA X 1-1/4 ---SURG.& SDS ONLY--- (50EA/BX)

## (undated) DEVICE — TEETHGUARD ENT -2BX MIN ORDER- (6EA/BX)

## (undated) DEVICE — ELECTRODE DUAL RETURN W/ CORD - (50/PK)

## (undated) DEVICE — GLOVE SZ 7.5 BIOGEL PI MICRO - PF LF (50PR/BX)

## (undated) DEVICE — PAD LAP STERILE 18 X 18 - (5/PK 40PK/CA)

## (undated) DEVICE — TIP INTPLS HFLO ML ORFC BTRY - (12/CS)  FOR SURGILAV

## (undated) DEVICE — SLEEVE, VASO, THIGH, MED

## (undated) DEVICE — WATER IRRIGATION STERILE 1000ML (12EA/CA)

## (undated) DEVICE — SLEEVE VASO CALF MED - (10PR/CA)

## (undated) DEVICE — NEPTUNE 4 PORT MANIFOLD - (20/PK)

## (undated) DEVICE — SENSOR SPO2 NEO LNCS ADHESIVE (20/BX) SEE USER NOTES

## (undated) DEVICE — GOWN SURGEONS X-LARGE - DISP. (30/CA)

## (undated) DEVICE — SYRINGE SAFETY 3 ML 18 GA X 1 1/2 BLUNT LL (100/BX 8BX/CA)

## (undated) DEVICE — DRESSING POST OP BORDER 4 X 10 (5EA/BX)

## (undated) DEVICE — SODIUM CHL. IRRIGATION 0.9% 3000ML (4EA/CA 65CA/PF)

## (undated) DEVICE — HANDPIECE 10FT INTPLS SCT PLS IRRIGATION HAND CONTROL SET (6/PK)

## (undated) DEVICE — BOVIE FOOT CONTROL SUCTION - 6IN 10FR (25EA/CA)

## (undated) DEVICE — PAD UNIVERSAL MULTI USE (1/EA)

## (undated) DEVICE — BLADE SAGITTAL SYSTEM 18MM

## (undated) DEVICE — SET EXTENSION WITH 2 PORTS (48EA/CA) ***PART #2C8610 IS A SUBSTITUTE*****

## (undated) DEVICE — TOURNIQUET CUFF 34 X 4 ONE PORT DISP - STERILE (10/BX)

## (undated) DEVICE — GLOVE BIOGEL PI INDICATOR SZ 6.0 SURGICAL PF LF -(200PR/CA)

## (undated) DEVICE — SUTURE 3-0 SILK FS 18 INCH - (12PK/BX)

## (undated) DEVICE — LENS/HOOD FOR SPACESUIT - (32/PK) PEEL AWAY FACE

## (undated) DEVICE — BLOCK

## (undated) DEVICE — SUTURE 4-0 CHROMIC GUT - 18 INCH G-3 D/A (12/BX)

## (undated) DEVICE — SUTURE 3-0 ETHILON FS-1 - (36/BX) 30 INCH